# Patient Record
Sex: MALE | Race: BLACK OR AFRICAN AMERICAN | NOT HISPANIC OR LATINO | Employment: OTHER | URBAN - METROPOLITAN AREA
[De-identification: names, ages, dates, MRNs, and addresses within clinical notes are randomized per-mention and may not be internally consistent; named-entity substitution may affect disease eponyms.]

---

## 2017-02-23 ENCOUNTER — TELEPHONE (OUTPATIENT)
Dept: SMOKING CESSATION | Facility: CLINIC | Age: 67
End: 2017-02-23

## 2017-02-23 NOTE — TELEPHONE ENCOUNTER
Called pt for missed appointment with smoking cessation. Left message with name and call back number to reschedule [phone].

## 2019-01-13 ENCOUNTER — HOSPITAL ENCOUNTER (INPATIENT)
Facility: HOSPITAL | Age: 69
LOS: 7 days | Discharge: HOME HEALTH | DRG: 871 | End: 2019-01-20
Attending: EMERGENCY MEDICINE | Admitting: INTERNAL MEDICINE
Payer: MEDICARE

## 2019-01-13 DIAGNOSIS — I95.9 HYPOTENSION: ICD-10-CM

## 2019-01-13 DIAGNOSIS — I50.9 CHF (CONGESTIVE HEART FAILURE): ICD-10-CM

## 2019-01-13 DIAGNOSIS — R09.02 HYPOXIA: ICD-10-CM

## 2019-01-13 DIAGNOSIS — A41.9 SEPSIS, DUE TO UNSPECIFIED ORGANISM: Primary | ICD-10-CM

## 2019-01-13 DIAGNOSIS — I49.9 ABNORMAL HEART RHYTHM: ICD-10-CM

## 2019-01-13 DIAGNOSIS — R94.31 ACUTE ELECTROCARDIOGRAPHY CHANGES: ICD-10-CM

## 2019-01-13 DIAGNOSIS — A41.9 SEPSIS: ICD-10-CM

## 2019-01-13 PROBLEM — R79.89 ELEVATED TROPONIN: Status: ACTIVE | Noted: 2019-01-13

## 2019-01-13 LAB
ALBUMIN SERPL BCP-MCNC: 3 G/DL
ALLENS TEST: ABNORMAL
ALP SERPL-CCNC: 202 U/L
ALT SERPL W/O P-5'-P-CCNC: 21 U/L
ANION GAP SERPL CALC-SCNC: 16 MMOL/L
AST SERPL-CCNC: 26 U/L
BACTERIA #/AREA URNS AUTO: ABNORMAL /HPF
BASOPHILS # BLD AUTO: 0.1 K/UL
BASOPHILS NFR BLD: 0.7 %
BILIRUB SERPL-MCNC: 1.6 MG/DL
BILIRUB UR QL STRIP: NEGATIVE
BNP SERPL-MCNC: 165 PG/ML
BUN SERPL-MCNC: 15 MG/DL (ref 6–30)
BUN SERPL-MCNC: 18 MG/DL
CALCIUM SERPL-MCNC: 8.8 MG/DL
CHLORIDE SERPL-SCNC: 106 MMOL/L
CHLORIDE SERPL-SCNC: 110 MMOL/L (ref 95–110)
CLARITY UR REFRACT.AUTO: ABNORMAL
CO2 SERPL-SCNC: 18 MMOL/L
COLOR UR AUTO: YELLOW
CREAT SERPL-MCNC: 1.3 MG/DL (ref 0.5–1.4)
CREAT SERPL-MCNC: 1.7 MG/DL
CTP QC/QA: YES
DELSYS: ABNORMAL
DIFFERENTIAL METHOD: ABNORMAL
EOSINOPHIL # BLD AUTO: 0 K/UL
EOSINOPHIL NFR BLD: 0.1 %
ERYTHROCYTE [DISTWIDTH] IN BLOOD BY AUTOMATED COUNT: 12.8 %
EST. GFR  (AFRICAN AMERICAN): 47.5 ML/MIN/1.73 M^2
EST. GFR  (NON AFRICAN AMERICAN): 41.1 ML/MIN/1.73 M^2
GLUCOSE SERPL-MCNC: 126 MG/DL
GLUCOSE SERPL-MCNC: 130 MG/DL (ref 70–110)
GLUCOSE UR QL STRIP: NEGATIVE
HCO3 UR-SCNC: 17.8 MMOL/L (ref 24–28)
HCT VFR BLD AUTO: 38.7 %
HCT VFR BLD CALC: 34 %PCV (ref 36–54)
HGB BLD-MCNC: 13.5 G/DL
HGB UR QL STRIP: ABNORMAL
IMM GRANULOCYTES # BLD AUTO: 0.6 K/UL
IMM GRANULOCYTES NFR BLD AUTO: 4.3 %
INR PPP: 1.2
KETONES UR QL STRIP: NEGATIVE
LACTATE SERPL-SCNC: 1.6 MMOL/L
LACTATE SERPL-SCNC: 3.4 MMOL/L
LEUKOCYTE ESTERASE UR QL STRIP: ABNORMAL
LIPASE SERPL-CCNC: 13 U/L
LYMPHOCYTES # BLD AUTO: 0.3 K/UL
LYMPHOCYTES NFR BLD: 1.9 %
MAGNESIUM SERPL-MCNC: 1.6 MG/DL
MCH RBC QN AUTO: 35.2 PG
MCHC RBC AUTO-ENTMCNC: 34.9 G/DL
MCV RBC AUTO: 101 FL
MICROSCOPIC COMMENT: ABNORMAL
MODE: ABNORMAL
MONOCYTES # BLD AUTO: 0.1 K/UL
MONOCYTES NFR BLD: 0.6 %
NEUTROPHILS # BLD AUTO: 12.9 K/UL
NEUTROPHILS NFR BLD: 92.4 %
NITRITE UR QL STRIP: NEGATIVE
NRBC BLD-RTO: 0 /100 WBC
PCO2 BLDA: 28.6 MMHG (ref 35–45)
PH SMN: 7.4 [PH] (ref 7.35–7.45)
PH UR STRIP: 5 [PH] (ref 5–8)
PHOSPHATE SERPL-MCNC: 1.1 MG/DL
PLATELET # BLD AUTO: 198 K/UL
PMV BLD AUTO: 9.7 FL
PO2 BLDA: 49 MMHG (ref 40–60)
POC BE: -7 MMOL/L
POC IONIZED CALCIUM: 0.95 MMOL/L (ref 1.06–1.42)
POC MOLECULAR INFLUENZA A AGN: NEGATIVE
POC MOLECULAR INFLUENZA B AGN: NEGATIVE
POC SATURATED O2: 85 % (ref 95–100)
POC TCO2 (MEASURED): 21 MMOL/L (ref 23–29)
POC TCO2: 19 MMOL/L (ref 24–29)
POTASSIUM BLD-SCNC: 3.7 MMOL/L (ref 3.5–5.1)
POTASSIUM SERPL-SCNC: 2.9 MMOL/L
PROT SERPL-MCNC: 7.4 G/DL
PROT UR QL STRIP: NEGATIVE
PROTHROMBIN TIME: 12.5 SEC
RBC # BLD AUTO: 3.84 M/UL
RBC #/AREA URNS AUTO: 7 /HPF (ref 0–4)
SAMPLE: ABNORMAL
SAMPLE: ABNORMAL
SITE: ABNORMAL
SODIUM BLD-SCNC: 142 MMOL/L (ref 136–145)
SODIUM SERPL-SCNC: 140 MMOL/L
SP GR UR STRIP: 1.01 (ref 1–1.03)
SQUAMOUS #/AREA URNS AUTO: 4 /HPF
TROPONIN I SERPL DL<=0.01 NG/ML-MCNC: 0.03 NG/ML
TROPONIN I SERPL DL<=0.01 NG/ML-MCNC: 0.08 NG/ML
TROPONIN I SERPL DL<=0.01 NG/ML-MCNC: 0.1 NG/ML
URN SPEC COLLECT METH UR: ABNORMAL
WBC # BLD AUTO: 13.98 K/UL
WBC #/AREA URNS AUTO: 17 /HPF (ref 0–5)

## 2019-01-13 PROCEDURE — 85610 PROTHROMBIN TIME: CPT

## 2019-01-13 PROCEDURE — 96361 HYDRATE IV INFUSION ADD-ON: CPT

## 2019-01-13 PROCEDURE — 84484 ASSAY OF TROPONIN QUANT: CPT | Mod: 91

## 2019-01-13 PROCEDURE — 93010 ELECTROCARDIOGRAM REPORT: CPT | Mod: ,,, | Performed by: INTERNAL MEDICINE

## 2019-01-13 PROCEDURE — 80053 COMPREHEN METABOLIC PANEL: CPT

## 2019-01-13 PROCEDURE — 63600175 PHARM REV CODE 636 W HCPCS: Performed by: PHYSICIAN ASSISTANT

## 2019-01-13 PROCEDURE — 99291 PR CRITICAL CARE, E/M 30-74 MINUTES: ICD-10-PCS | Mod: ,,, | Performed by: EMERGENCY MEDICINE

## 2019-01-13 PROCEDURE — 84484 ASSAY OF TROPONIN QUANT: CPT

## 2019-01-13 PROCEDURE — 99900035 HC TECH TIME PER 15 MIN (STAT)

## 2019-01-13 PROCEDURE — 87186 SC STD MICRODIL/AGAR DIL: CPT

## 2019-01-13 PROCEDURE — 82803 BLOOD GASES ANY COMBINATION: CPT

## 2019-01-13 PROCEDURE — 96367 TX/PROPH/DG ADDL SEQ IV INF: CPT

## 2019-01-13 PROCEDURE — 87040 BLOOD CULTURE FOR BACTERIA: CPT | Mod: 59

## 2019-01-13 PROCEDURE — 83735 ASSAY OF MAGNESIUM: CPT

## 2019-01-13 PROCEDURE — 87086 URINE CULTURE/COLONY COUNT: CPT

## 2019-01-13 PROCEDURE — 87449 NOS EACH ORGANISM AG IA: CPT

## 2019-01-13 PROCEDURE — 99291 CRITICAL CARE FIRST HOUR: CPT | Mod: ,,, | Performed by: EMERGENCY MEDICINE

## 2019-01-13 PROCEDURE — 25000003 PHARM REV CODE 250: Performed by: PHYSICIAN ASSISTANT

## 2019-01-13 PROCEDURE — 83605 ASSAY OF LACTIC ACID: CPT | Mod: 91

## 2019-01-13 PROCEDURE — 12000002 HC ACUTE/MED SURGE SEMI-PRIVATE ROOM

## 2019-01-13 PROCEDURE — 93010 EKG 12-LEAD: ICD-10-PCS | Mod: ,,, | Performed by: INTERNAL MEDICINE

## 2019-01-13 PROCEDURE — 85025 COMPLETE CBC W/AUTO DIFF WBC: CPT

## 2019-01-13 PROCEDURE — 83690 ASSAY OF LIPASE: CPT

## 2019-01-13 PROCEDURE — 93005 ELECTROCARDIOGRAM TRACING: CPT

## 2019-01-13 PROCEDURE — 84100 ASSAY OF PHOSPHORUS: CPT

## 2019-01-13 PROCEDURE — 87077 CULTURE AEROBIC IDENTIFY: CPT

## 2019-01-13 PROCEDURE — 96366 THER/PROPH/DIAG IV INF ADDON: CPT

## 2019-01-13 PROCEDURE — 96365 THER/PROPH/DIAG IV INF INIT: CPT

## 2019-01-13 PROCEDURE — 99285 EMERGENCY DEPT VISIT HI MDM: CPT | Mod: 25

## 2019-01-13 PROCEDURE — 81001 URINALYSIS AUTO W/SCOPE: CPT

## 2019-01-13 PROCEDURE — 87076 CULTURE ANAEROBE IDENT EACH: CPT | Mod: 59

## 2019-01-13 PROCEDURE — 83880 ASSAY OF NATRIURETIC PEPTIDE: CPT

## 2019-01-13 RX ORDER — NOREPINEPHRINE BITARTRATE/D5W 4MG/250ML
PLASTIC BAG, INJECTION (ML) INTRAVENOUS
Status: DISPENSED
Start: 2019-01-13 | End: 2019-01-14

## 2019-01-13 RX ORDER — SODIUM CHLORIDE 9 MG/ML
INJECTION, SOLUTION INTRAVENOUS CONTINUOUS
Status: DISCONTINUED | OUTPATIENT
Start: 2019-01-14 | End: 2019-01-13

## 2019-01-13 RX ORDER — POTASSIUM CHLORIDE 20 MEQ/15ML
60 SOLUTION ORAL
Status: DISCONTINUED | OUTPATIENT
Start: 2019-01-13 | End: 2019-01-13

## 2019-01-13 RX ORDER — GLUCAGON 1 MG
1 KIT INJECTION
Status: DISCONTINUED | OUTPATIENT
Start: 2019-01-14 | End: 2019-01-15

## 2019-01-13 RX ORDER — SODIUM CHLORIDE 0.9 % (FLUSH) 0.9 %
5 SYRINGE (ML) INJECTION
Status: DISCONTINUED | OUTPATIENT
Start: 2019-01-14 | End: 2019-01-20 | Stop reason: HOSPADM

## 2019-01-13 RX ORDER — IBUPROFEN 200 MG
16 TABLET ORAL
Status: DISCONTINUED | OUTPATIENT
Start: 2019-01-14 | End: 2019-01-15

## 2019-01-13 RX ORDER — VANCOMYCIN 2 GRAM/500 ML IN 0.9 % SODIUM CHLORIDE INTRAVENOUS
2000
Status: COMPLETED | OUTPATIENT
Start: 2019-01-13 | End: 2019-01-14

## 2019-01-13 RX ORDER — POTASSIUM CHLORIDE 20 MEQ/15ML
40 SOLUTION ORAL
Status: COMPLETED | OUTPATIENT
Start: 2019-01-13 | End: 2019-01-13

## 2019-01-13 RX ORDER — SODIUM,POTASSIUM PHOSPHATES 280-250MG
2 POWDER IN PACKET (EA) ORAL ONCE
Status: COMPLETED | OUTPATIENT
Start: 2019-01-13 | End: 2019-01-13

## 2019-01-13 RX ORDER — IBUPROFEN 200 MG
1 TABLET ORAL DAILY
Status: DISCONTINUED | OUTPATIENT
Start: 2019-01-14 | End: 2019-01-20 | Stop reason: HOSPADM

## 2019-01-13 RX ORDER — RAMELTEON 8 MG/1
8 TABLET ORAL NIGHTLY PRN
Status: DISCONTINUED | OUTPATIENT
Start: 2019-01-14 | End: 2019-01-20 | Stop reason: HOSPADM

## 2019-01-13 RX ORDER — SODIUM CHLORIDE 0.9 % (FLUSH) 0.9 %
5 SYRINGE (ML) INJECTION
Status: CANCELLED | OUTPATIENT
Start: 2019-01-13

## 2019-01-13 RX ORDER — SODIUM CHLORIDE 9 MG/ML
INJECTION, SOLUTION INTRAVENOUS CONTINUOUS
Status: DISCONTINUED | OUTPATIENT
Start: 2019-01-13 | End: 2019-01-14

## 2019-01-13 RX ORDER — NOREPINEPHRINE BITARTRATE/D5W 4MG/250ML
0.05 PLASTIC BAG, INJECTION (ML) INTRAVENOUS CONTINUOUS
Status: DISCONTINUED | OUTPATIENT
Start: 2019-01-13 | End: 2019-01-13

## 2019-01-13 RX ORDER — IBUPROFEN 200 MG
24 TABLET ORAL
Status: DISCONTINUED | OUTPATIENT
Start: 2019-01-14 | End: 2019-01-15

## 2019-01-13 RX ORDER — MAGNESIUM SULFATE HEPTAHYDRATE 40 MG/ML
2 INJECTION, SOLUTION INTRAVENOUS
Status: COMPLETED | OUTPATIENT
Start: 2019-01-13 | End: 2019-01-14

## 2019-01-13 RX ORDER — ACETAMINOPHEN 325 MG/1
650 TABLET ORAL
Status: COMPLETED | OUTPATIENT
Start: 2019-01-13 | End: 2019-01-13

## 2019-01-13 RX ORDER — ACETAMINOPHEN 325 MG/1
650 TABLET ORAL EVERY 8 HOURS PRN
Status: DISCONTINUED | OUTPATIENT
Start: 2019-01-14 | End: 2019-01-17

## 2019-01-13 RX ADMIN — Medication 2000 MG: at 09:01

## 2019-01-13 RX ADMIN — POTASSIUM & SODIUM PHOSPHATES POWDER PACK 280-160-250 MG 2 PACKET: 280-160-250 PACK at 07:01

## 2019-01-13 RX ADMIN — POTASSIUM CHLORIDE 40 MEQ: 20 SOLUTION ORAL at 07:01

## 2019-01-13 RX ADMIN — SODIUM CHLORIDE, SODIUM LACTATE, POTASSIUM CHLORIDE, AND CALCIUM CHLORIDE 1000 ML: .6; .31; .03; .02 INJECTION, SOLUTION INTRAVENOUS at 07:01

## 2019-01-13 RX ADMIN — PIPERACILLIN AND TAZOBACTAM 4.5 G: 4; .5 INJECTION, POWDER, LYOPHILIZED, FOR SOLUTION INTRAVENOUS; PARENTERAL at 10:01

## 2019-01-13 RX ADMIN — CEFTRIAXONE 2 G: 2 INJECTION, SOLUTION INTRAVENOUS at 05:01

## 2019-01-13 RX ADMIN — ACETAMINOPHEN 650 MG: 325 TABLET ORAL at 05:01

## 2019-01-13 RX ADMIN — MAGNESIUM SULFATE IN WATER 2 G: 40 INJECTION, SOLUTION INTRAVENOUS at 07:01

## 2019-01-13 RX ADMIN — SODIUM CHLORIDE 2586 ML: 0.9 INJECTION, SOLUTION INTRAVENOUS at 04:01

## 2019-01-13 NOTE — ED TRIAGE NOTES
C/o 1 week of nausea and diarrhea, diagnosed with prostatitis yesterday in ed, states he was more fatigued and short of breath this morning, denies pain at present

## 2019-01-13 NOTE — ED PROVIDER NOTES
"Encounter Date: 1/13/2019    SCRIBE #1 NOTE: I, Meenu Jimenez, am scribing for, and in the presence of,  Dr. Odonnell. I have scribed the following portions of the note - the EKG reading.       History     Chief Complaint   Patient presents with    Dizziness     dizziness x 1 hour, "can't eat", no appetite, "stays cold", states he has been shaking, pt diaphoretic in triage     This is a 68 year old male with no known significant PMH who presents to the ED with dizziness. He reports a 1 week history of nausea, unable to keep anything down - is having diarrhea with any oral intake. Also endorsing urinary frequency, dysuria, and urgency. He was seen in the ED yesterday and treated for prostatitis with Cipro (has taken 2 doses). This morning he awoke feeling more fatigued, short of breath, and dizzy described as near syncopal. Endorses fever, chills, and rigors. Has mild low back pain across his back. His brother brings him to the ED for further evaluation. Denies URI symptoms, chest pain, vomiting, abdominal pain, gross hematuria, melena or hematochezia. No prior abdominal surgeries. No known sick contacts or recent travel.          Review of patient's allergies indicates:  No Known Allergies  History reviewed. No pertinent past medical history.  Past Surgical History:   Procedure Laterality Date    SKIN GRAFT       History reviewed. No pertinent family history.  Social History     Tobacco Use    Smoking status: Current Every Day Smoker     Packs/day: 0.50    Smokeless tobacco: Never Used   Substance Use Topics    Alcohol use: Yes     Alcohol/week: 6.0 oz     Types: 10 Cans of beer per week    Drug use: No     Review of Systems   Constitutional: Positive for chills, fatigue and fever.   HENT: Negative for sore throat.    Respiratory: Positive for shortness of breath. Negative for cough.    Cardiovascular: Negative for chest pain and leg swelling.   Gastrointestinal: Positive for diarrhea and nausea. Negative for " abdominal pain and vomiting.   Genitourinary: Negative for dysuria.   Musculoskeletal: Positive for back pain.   Skin: Negative for rash.   Neurological: Positive for dizziness. Negative for weakness.   Hematological: Does not bruise/bleed easily.       Physical Exam     Initial Vitals [01/13/19 1558]   BP Pulse Resp Temp SpO2   (!) 90/51 (!) 162 (!) 24 98 °F (36.7 °C) (!) 85 %      MAP       --         Physical Exam    Constitutional: He appears well-developed and well-nourished. He appears distressed.   HENT:   Head: Atraumatic.   Mouth/Throat: Mucous membranes are dry.   Eyes: Conjunctivae and EOM are normal. Pupils are equal, round, and reactive to light.   Neck: Normal range of motion and full passive range of motion without pain. Neck supple.   Cardiovascular: Regular rhythm, normal heart sounds and intact distal pulses. Tachycardia present.    No edema.   Pulmonary/Chest: Breath sounds normal. Tachypnea noted. He has no wheezes. He has no rhonchi. He has no rales.   Abdominal: Soft. Bowel sounds are normal. There is no tenderness. There is no rigidity, no rebound, no guarding and no CVA tenderness.   Genitourinary: Right testis shows no swelling and no tenderness. Left testis shows no swelling and no tenderness. Uncircumcised. No penile tenderness.   Neurological: He is alert and oriented to person, place, and time.   Skin: Skin is warm and dry. No rash noted.         ED Course   Critical Care  Date/Time: 1/13/2019 5:19 PM  Performed by: Je Odonnell MD  Authorized by: Je Odonnell MD   Direct patient critical care time: 15 minutes  Additional history critical care time: 10 minutes  Ordering / reviewing critical care time: 10 minutes  Documentation critical care time: 10 minutes  Consulting other physicians critical care time: 5 minutes  Total critical care time (exclusive of procedural time) : 50 minutes  Critical care was necessary to treat or prevent imminent or life-threatening deterioration of  the following conditions: sepsis, shock and respiratory failure.  Critical care was time spent personally by me on the following activities: evaluation of patient's response to treatment, examination of patient, obtaining history from patient or surrogate, ordering and performing treatments and interventions, ordering and review of laboratory studies, ordering and review of radiographic studies, pulse oximetry, re-evaluation of patient's condition and review of old charts.  Comments: Critical care required for hypotension, respiratory distress, sepsis.        Labs Reviewed   CBC W/ AUTO DIFFERENTIAL - Abnormal; Notable for the following components:       Result Value    WBC 13.98 (*)     RBC 3.84 (*)     Hemoglobin 13.5 (*)     Hematocrit 38.7 (*)      (*)     MCH 35.2 (*)     Immature Granulocytes 4.3 (*)     Gran # (ANC) 12.9 (*)     Immature Grans (Abs) 0.60 (*)     Lymph # 0.3 (*)     Mono # 0.1 (*)     Gran% 92.4 (*)     Lymph% 1.9 (*)     Mono% 0.6 (*)     All other components within normal limits    Narrative:     lav shared   COMPREHENSIVE METABOLIC PANEL - Abnormal; Notable for the following components:    Potassium 2.9 (*)     CO2 18 (*)     Glucose 126 (*)     Creatinine 1.7 (*)     Albumin 3.0 (*)     Total Bilirubin 1.6 (*)     Alkaline Phosphatase 202 (*)     eGFR if  47.5 (*)     eGFR if non  41.1 (*)     All other components within normal limits    Narrative:     lav shared   LACTIC ACID, PLASMA - Abnormal; Notable for the following components:    Lactate (Lactic Acid) 3.4 (*)     All other components within normal limits    Narrative:     lav shared   URINALYSIS, REFLEX TO URINE CULTURE - Abnormal; Notable for the following components:    Appearance, UA Hazy (*)     Occult Blood UA 1+ (*)     Leukocytes, UA 1+ (*)     All other components within normal limits    Narrative:     Preferred Collection Type->Urine, Clean Catch  yellow and grey   B-TYPE NATRIURETIC  PEPTIDE - Abnormal; Notable for the following components:     (*)     All other components within normal limits    Narrative:     lav shared   TROPONIN I - Abnormal; Notable for the following components:    Troponin I 0.034 (*)     All other components within normal limits    Narrative:     lav shared   PHOSPHORUS - Abnormal; Notable for the following components:    Phosphorus 1.1 (*)     All other components within normal limits    Narrative:     lav shared   TROPONIN I - Abnormal; Notable for the following components:    Troponin I 0.101 (*)     All other components within normal limits   URINALYSIS MICROSCOPIC - Abnormal; Notable for the following components:    RBC, UA 7 (*)     WBC, UA 17 (*)     All other components within normal limits    Narrative:     Preferred Collection Type->Urine, Clean Catch  yellow and grey   ISTAT PROCEDURE - Abnormal; Notable for the following components:    POC PCO2 28.6 (*)     POC HCO3 17.8 (*)     POC SATURATED O2 85 (*)     POC TCO2 19 (*)     All other components within normal limits   ISTAT PROCEDURE - Abnormal; Notable for the following components:    POC Glucose 130 (*)     POC TCO2 (MEASURED) 21 (*)     POC Ionized Calcium 0.95 (*)     POC Hematocrit 34 (*)     All other components within normal limits   CULTURE, BLOOD   CULTURE, BLOOD   CULTURE, URINE   CLOSTRIDIUM DIFFICILE   LACTIC ACID, PLASMA   PROTIME-INR    Narrative:     lav shared   LIPASE    Narrative:     lav shared   MAGNESIUM    Narrative:     lav shared   TROPONIN I   POCT INFLUENZA A/B MOLECULAR   ISTAT CHEM8     EKG Readings: (Independently Interpreted)   Rhythm: Sinus Tachycardia. Heart Rate: 134. Axis: Normal.   Nonspecific ST abnormalities present.  QTc is 561.       Imaging Results          X-Ray Chest AP Portable (Final result)  Result time 01/13/19 16:37:42    Final result by Jose M Armijo Jr., MD (01/13/19 16:37:42)                 Impression:      No detrimental change.  No acute  abnormality.      Electronically signed by: Jose M Armijo MD  Date:    01/13/2019  Time:    16:37             Narrative:    EXAMINATION:  XR CHEST AP PORTABLE    CLINICAL HISTORY:  Sepsis;    TECHNIQUE:  Single frontal view of the chest was performed.    COMPARISON:  December 2012.    FINDINGS:  Heart size and pulmonary vessels are similar.  The lungs fairly well aerated.  Few increased markings at the bases unchanged.                                 Medical Decision Making:   History:   Old Medical Records: I decided to obtain old medical records.  Independently Interpreted Test(s):   I have ordered and independently interpreted EKG Reading(s) - see prior notes  Clinical Tests:   Lab Tests: Ordered and Reviewed  Radiological Study: Ordered and Reviewed  Medical Tests: Ordered and Reviewed       APC / Resident Notes:   68 year old male presenting with dizziness, fatigue, recent dx of prostatitis.  On exam he is afebrile, appears uncomfortable - tachycardic to 162, BP 90/51, O2 85%. Lungs are clear. Abdomen is soft, nontender. No peripheral edema.  exam unremarkable.    DDx includes but is not limited to sepsis, septic shock, UTI, pyelonephritis, prostatitis, diverticulitis, heart failure.    Patient requiring supplemental oxygen to keep sat > 93%. BP improved with fluid resuscitation, given 30ccs/kg. Antibiotics were given per sepsis protocol. I attest, a sepsis perfusion exam was performed within 6 hours of Septic Shock presentation, following fluid resuscitation.     Labs demonstrate WBC 13.98 with shift, H&H 13/28, K 2.9 - repleated in ED, Cr 1.7, phos 1.1 - repleated in ED. Troponin uptrending 0.034 >>0.101, suspect due to demand ischemia.  Will plan for admission. I discussed the care of this patient with my supervising MD.        Scribe Attestation:   Scribe #1: I performed the above scribed service and the documentation accurately describes the services I performed. I attest to the accuracy of the  note.    Attending Attestation:     Physician Attestation Statement for NP/PA:   I have conducted a face to face encounter with this patient in addition to the NP/PA, due to Medical Complexity    Other NP/PA Attestation Additions:    History of Present Illness: [age]-year-old male with no past medical history coming in with nausea, diarrhea, dysuria.  Was seen yesterday diagnosed with possible prostatitis given antibiotics discharge. Symptoms have worsened today.  He denies abdominal pain. He denies chest pain or shortness of breath. Endorses generalized weakness.    Physical Exam: GENERAL APPEARANCE: Well developed, well nourished, ill-appearing, tired-appearing.  HENT: Normocephalic, atraumatic    EYES: Sclerae anicteric   NECK: Supple  LUNGS: Breathing comfortably. Speaking in full sentences   NEUROLOGIC: Alert, interacting normally. No facial droop.   Abdomen:  Abdomen is soft nontender  :  There is no testicular swelling, or erythema, there is mild intertrigo.  There is no bull eye.  There is no crepitus, penis is unremarkable.   MSK: Moving all four extremities. There is no CVA tenderness to palpation. There is no C, T, L spine tenderness to palpation  Skin: Warm and dry. No visible rash on exposed areas of skin.    Psych: Mood and affect normal.      Medical Decision Making: Hypotension, hypoxia, tachycardia recent possible UTI.  Unclear cause for hypoxic this time, possible poor perfusion in the setting of sepsis.  There is no signs of marcelino gangrene.  Possible urosepsis.   Initiated sepsis order set including full set of labs, 30 cc/kg, cultures, antibiotics.    Chest x-ray done no signs of infiltrate, edema.  Point of care ultrasound undertaken, no pericardial effusion, IVC partially collapsing with respirations, there is no fluid in Morison's pouch or the splenorenal pouch, there is no obvious AAA.  Will continue to monitor closely for changes in blood pressure, O2 sat, heart rate as treatment is  given.  Patient will need to be admitted    Update:  Labs noted. K, and phosphate repleted.  Urine possible signs possible infection as source of sepsis.  Chest x-ray with no signs of infection.     Patient given 3.5 L of IV fluids, still remains borderline hypertensive.   Was going to start pressors, or patient's map above 65 suppressants not started in the emergency department.  Hypoxia improving although still at this point because unclear.  Patient has no chest pain, no significant shortness of breath, his complaints are primarily diarrhea and nausea, he has no lower extremity swelling, this is not consistent with DVT PE at this time.  Patient to continue be observed and if no cause of hypoxia is identified, hypoxia worsens, patient develops chest pain, patient started developing shortness of breath, lightness of PE should be considered further.   Cover with antibiotics.   Repeat lactate trending down.     Trop is trending mildly up, however there is no chest pain.  EKG is sinus tachycardia with no significant ischemic changes, will need to trend troponin.     Given hypotension critical care medicine consulted.  They recommend broadening antibiotics and scrotal ultrasound.   Not CCU candidate at this time. Those orders are placed.      Patient will be admitted to telemetry for continued close monitoring.                     Clinical Impression:   The primary encounter diagnosis was Sepsis, due to unspecified organism. Diagnoses of Hypotension and Hypoxia were also pertinent to this visit.      Disposition:   Disposition: Admitted  Condition: Serious                        Renetta Albert PA-C  01/13/19 0138       Je Odonnell MD  01/13/19 0052

## 2019-01-13 NOTE — PROVIDER PROGRESS NOTES - EMERGENCY DEPT.
Encounter Date: 1/13/2019    ED Physician Progress Notes        Physician Note:   Labs still not resulted.  Calling lab to find out cause of delay.   BP mildy improved.  O2 sat improved.  HR improved.

## 2019-01-13 NOTE — PROVIDER PROGRESS NOTES - EMERGENCY DEPT.
Encounter Date: 1/13/2019    ED Physician Progress Notes        Physician Note:   Labs found. They are being sent up to lab now.

## 2019-01-14 PROBLEM — N30.00 ACUTE CYSTITIS WITHOUT HEMATURIA: Status: ACTIVE | Noted: 2019-01-14

## 2019-01-14 PROBLEM — N17.9 AKI (ACUTE KIDNEY INJURY): Status: ACTIVE | Noted: 2019-01-14

## 2019-01-14 PROBLEM — R65.20 SEVERE SEPSIS: Status: ACTIVE | Noted: 2019-01-13

## 2019-01-14 PROBLEM — I95.9 HYPOTENSION: Status: ACTIVE | Noted: 2019-01-14

## 2019-01-14 PROBLEM — R79.89 ELEVATED TROPONIN: Status: RESOLVED | Noted: 2019-01-13 | Resolved: 2019-01-14

## 2019-01-14 PROBLEM — I10 ESSENTIAL HYPERTENSION: Status: ACTIVE | Noted: 2019-01-14

## 2019-01-14 PROBLEM — Z75.8 DISCHARGE PLANNING ISSUES: Status: ACTIVE | Noted: 2019-01-14

## 2019-01-14 PROBLEM — J96.01 ACUTE RESPIRATORY FAILURE WITH HYPOXIA: Status: ACTIVE | Noted: 2019-01-14

## 2019-01-14 LAB
ALBUMIN SERPL BCP-MCNC: 3.1 G/DL
ALLENS TEST: ABNORMAL
ALP SERPL-CCNC: 192 U/L
ALT SERPL W/O P-5'-P-CCNC: 28 U/L
AMMONIA PLAS-SCNC: 42 UMOL/L
ANION GAP SERPL CALC-SCNC: 13 MMOL/L
ANISOCYTOSIS BLD QL SMEAR: SLIGHT
ASCENDING AORTA: 3.57 CM
AST SERPL-CCNC: 43 U/L
AV INDEX (PROSTH): 1.37
AV MEAN GRADIENT: 2.89 MMHG
AV PEAK GRADIENT: 5.57 MMHG
AV VALVE AREA: 4.78 CM2
BASOPHILS # BLD AUTO: 0.1 K/UL
BASOPHILS NFR BLD: 0.7 %
BILIRUB SERPL-MCNC: 1.5 MG/DL
BNP SERPL-MCNC: 730 PG/ML
BSA FOR ECHO PROCEDURE: 2.05 M2
BUN SERPL-MCNC: 14 MG/DL
C DIFF GDH STL QL: NEGATIVE
C DIFF TOX A+B STL QL IA: NEGATIVE
CALCIUM SERPL-MCNC: 9 MG/DL
CHLORIDE SERPL-SCNC: 111 MMOL/L
CO2 SERPL-SCNC: 18 MMOL/L
CREAT SERPL-MCNC: 1.2 MG/DL
CV ECHO LV RWT: 0.31 CM
DELSYS: ABNORMAL
DIFFERENTIAL METHOD: ABNORMAL
DOP CALC AO PEAK VEL: 1.18 M/S
DOP CALC AO VTI: 16.32 CM
DOP CALC LVOT AREA: 3.49 CM2
DOP CALC LVOT DIAMETER: 2.11 CM
DOP CALC LVOT STROKE VOLUME: 78.08 CM3
DOP CALCLVOT PEAK VEL VTI: 22.34 CM
E WAVE DECELERATION TIME: 313.84 MSEC
E/A RATIO: 0.74
E/E' RATIO: 5.64
ECHO LV POSTERIOR WALL: 0.8 CM (ref 0.6–1.1)
EOSINOPHIL # BLD AUTO: 0 K/UL
EOSINOPHIL NFR BLD: 0.1 %
EP: 5
ERYTHROCYTE [DISTWIDTH] IN BLOOD BY AUTOMATED COUNT: 13 %
ERYTHROCYTE [SEDIMENTATION RATE] IN BLOOD BY WESTERGREN METHOD: 14 MM/H
EST. GFR  (AFRICAN AMERICAN): >60 ML/MIN/1.73 M^2
EST. GFR  (NON AFRICAN AMERICAN): >60 ML/MIN/1.73 M^2
ESTIMATED AVG GLUCOSE: 97 MG/DL
FIO2: 50
FOLATE SERPL-MCNC: 8.5 NG/ML
FRACTIONAL SHORTENING: 35 % (ref 28–44)
GGT SERPL-CCNC: 195 U/L
GLUCOSE SERPL-MCNC: 135 MG/DL
HBA1C MFR BLD HPLC: 5 %
HCO3 UR-SCNC: 19.3 MMOL/L (ref 24–28)
HCT VFR BLD AUTO: 43.3 %
HGB BLD-MCNC: 14.8 G/DL
IMM GRANULOCYTES # BLD AUTO: 0.08 K/UL
IMM GRANULOCYTES NFR BLD AUTO: 0.6 %
INTERVENTRICULAR SEPTUM: 0.8 CM (ref 0.6–1.1)
IP: 12
LA MAJOR: 5.29 CM
LA MINOR: 5.32 CM
LA WIDTH: 3.22 CM
LDH SERPL L TO P-CCNC: 1.25 MMOL/L (ref 0.36–1.25)
LEFT ATRIUM SIZE: 3.57 CM
LEFT ATRIUM VOLUME INDEX: 25.9 ML/M2
LEFT ATRIUM VOLUME: 51.84 CM3
LEFT INTERNAL DIMENSION IN SYSTOLE: 3.3 CM (ref 2.1–4)
LEFT VENTRICLE DIASTOLIC VOLUME INDEX: 55.28 ML/M2
LEFT VENTRICLE DIASTOLIC VOLUME: 110.74 ML
LEFT VENTRICLE MASS INDEX: 70.1 G/M2
LEFT VENTRICLE SYSTOLIC VOLUME INDEX: 23 ML/M2
LEFT VENTRICLE SYSTOLIC VOLUME: 45.99 ML
LEFT VENTRICULAR INTERNAL DIMENSION IN DIASTOLE: 5.1 CM (ref 3.5–6)
LEFT VENTRICULAR MASS: 140.47 G
LV LATERAL E/E' RATIO: 5.17
LV SEPTAL E/E' RATIO: 6.2
LYMPHOCYTES # BLD AUTO: 1.2 K/UL
LYMPHOCYTES NFR BLD: 8.8 %
MAGNESIUM SERPL-MCNC: 2.2 MG/DL
MCH RBC QN AUTO: 34.8 PG
MCHC RBC AUTO-ENTMCNC: 34.2 G/DL
MCV RBC AUTO: 102 FL
MIN VOL: 45
MODE: ABNORMAL
MONOCYTES # BLD AUTO: 0.2 K/UL
MONOCYTES NFR BLD: 1.3 %
MV PEAK A VEL: 0.84 M/S
MV PEAK E VEL: 0.62 M/S
NEUTROPHILS # BLD AUTO: 12.4 K/UL
NEUTROPHILS NFR BLD: 88.5 %
NRBC BLD-RTO: 0 /100 WBC
PCO2 BLDA: 42.1 MMHG (ref 35–45)
PH SMN: 7.27 [PH] (ref 7.35–7.45)
PHOSPHATE SERPL-MCNC: 3.1 MG/DL
PHOSPHATE SERPL-MCNC: 3.4 MG/DL
PISA TR MAX VEL: 2.9 M/S
PLATELET # BLD AUTO: 183 K/UL
PLATELET BLD QL SMEAR: ABNORMAL
PMV BLD AUTO: 9.4 FL
PO2 BLDA: 117 MMHG (ref 80–100)
POC BE: -8 MMOL/L
POC SATURATED O2: 98 % (ref 95–100)
POC TCO2: 21 MMOL/L (ref 23–27)
POLYCHROMASIA BLD QL SMEAR: ABNORMAL
POTASSIUM SERPL-SCNC: 3.4 MMOL/L
PROT SERPL-MCNC: 8.1 G/DL
RA MAJOR: 4.72 CM
RA PRESSURE: 3 MMHG
RA WIDTH: 3.35 CM
RBC # BLD AUTO: 4.25 M/UL
RIGHT VENTRICULAR END-DIASTOLIC DIMENSION: 3.16 CM
SAMPLE: ABNORMAL
SINUS: 3.3 CM
SITE: ABNORMAL
SODIUM SERPL-SCNC: 142 MMOL/L
SP02: 95
SPONT RATE: 24
STJ: 3.32 CM
T4 FREE SERPL-MCNC: 0.99 NG/DL
TDI LATERAL: 0.12
TDI SEPTAL: 0.1
TDI: 0.11
TR MAX PG: 33.64 MMHG
TRICUSPID ANNULAR PLANE SYSTOLIC EXCURSION: 2.5 CM
TROPONIN I SERPL DL<=0.01 NG/ML-MCNC: 0.07 NG/ML
TSH SERPL DL<=0.005 MIU/L-ACNC: 0.93 UIU/ML
TV REST PULMONARY ARTERY PRESSURE: 37 MMHG
VIT B12 SERPL-MCNC: 529 PG/ML
WBC # BLD AUTO: 14 K/UL

## 2019-01-14 PROCEDURE — 36415 COLL VENOUS BLD VENIPUNCTURE: CPT

## 2019-01-14 PROCEDURE — 83880 ASSAY OF NATRIURETIC PEPTIDE: CPT

## 2019-01-14 PROCEDURE — 82607 VITAMIN B-12: CPT

## 2019-01-14 PROCEDURE — 94761 N-INVAS EAR/PLS OXIMETRY MLT: CPT

## 2019-01-14 PROCEDURE — 84439 ASSAY OF FREE THYROXINE: CPT

## 2019-01-14 PROCEDURE — 84425 ASSAY OF VITAMIN B-1: CPT

## 2019-01-14 PROCEDURE — 82746 ASSAY OF FOLIC ACID SERUM: CPT

## 2019-01-14 PROCEDURE — 83036 HEMOGLOBIN GLYCOSYLATED A1C: CPT

## 2019-01-14 PROCEDURE — 84443 ASSAY THYROID STIM HORMONE: CPT

## 2019-01-14 PROCEDURE — 63600175 PHARM REV CODE 636 W HCPCS: Performed by: STUDENT IN AN ORGANIZED HEALTH CARE EDUCATION/TRAINING PROGRAM

## 2019-01-14 PROCEDURE — 82140 ASSAY OF AMMONIA: CPT

## 2019-01-14 PROCEDURE — 99223 1ST HOSP IP/OBS HIGH 75: CPT | Mod: ,,, | Performed by: HOSPITALIST

## 2019-01-14 PROCEDURE — 27000190 HC CPAP FULL FACE MASK W/VALVE

## 2019-01-14 PROCEDURE — 83735 ASSAY OF MAGNESIUM: CPT

## 2019-01-14 PROCEDURE — 20600001 HC STEP DOWN PRIVATE ROOM

## 2019-01-14 PROCEDURE — 99291 CRITICAL CARE FIRST HOUR: CPT | Mod: ,,, | Performed by: NURSE PRACTITIONER

## 2019-01-14 PROCEDURE — 84484 ASSAY OF TROPONIN QUANT: CPT

## 2019-01-14 PROCEDURE — 94660 CPAP INITIATION&MGMT: CPT

## 2019-01-14 PROCEDURE — 80053 COMPREHEN METABOLIC PANEL: CPT

## 2019-01-14 PROCEDURE — 99900035 HC TECH TIME PER 15 MIN (STAT)

## 2019-01-14 PROCEDURE — 83605 ASSAY OF LACTIC ACID: CPT

## 2019-01-14 PROCEDURE — 63600175 PHARM REV CODE 636 W HCPCS: Performed by: INTERNAL MEDICINE

## 2019-01-14 PROCEDURE — 27000221 HC OXYGEN, UP TO 24 HOURS

## 2019-01-14 PROCEDURE — 25000003 PHARM REV CODE 250: Performed by: STUDENT IN AN ORGANIZED HEALTH CARE EDUCATION/TRAINING PROGRAM

## 2019-01-14 PROCEDURE — 84100 ASSAY OF PHOSPHORUS: CPT

## 2019-01-14 PROCEDURE — 99291 PR CRITICAL CARE, E/M 30-74 MINUTES: ICD-10-PCS | Mod: ,,, | Performed by: NURSE PRACTITIONER

## 2019-01-14 PROCEDURE — 25000242 PHARM REV CODE 250 ALT 637 W/ HCPCS: Performed by: STUDENT IN AN ORGANIZED HEALTH CARE EDUCATION/TRAINING PROGRAM

## 2019-01-14 PROCEDURE — 85025 COMPLETE CBC W/AUTO DIFF WBC: CPT

## 2019-01-14 PROCEDURE — 82977 ASSAY OF GGT: CPT

## 2019-01-14 PROCEDURE — 25000003 PHARM REV CODE 250: Performed by: INTERNAL MEDICINE

## 2019-01-14 PROCEDURE — 82803 BLOOD GASES ANY COMBINATION: CPT

## 2019-01-14 PROCEDURE — 94640 AIRWAY INHALATION TREATMENT: CPT

## 2019-01-14 PROCEDURE — 99223 PR INITIAL HOSPITAL CARE,LEVL III: ICD-10-PCS | Mod: ,,, | Performed by: HOSPITALIST

## 2019-01-14 PROCEDURE — 84100 ASSAY OF PHOSPHORUS: CPT | Mod: 91

## 2019-01-14 PROCEDURE — 36600 WITHDRAWAL OF ARTERIAL BLOOD: CPT

## 2019-01-14 RX ORDER — IPRATROPIUM BROMIDE AND ALBUTEROL SULFATE 2.5; .5 MG/3ML; MG/3ML
3 SOLUTION RESPIRATORY (INHALATION) ONCE
Status: COMPLETED | OUTPATIENT
Start: 2019-01-14 | End: 2019-01-14

## 2019-01-14 RX ORDER — FUROSEMIDE 10 MG/ML
40 INJECTION INTRAMUSCULAR; INTRAVENOUS ONCE
Status: COMPLETED | OUTPATIENT
Start: 2019-01-14 | End: 2019-01-14

## 2019-01-14 RX ORDER — METHYLPREDNISOLONE SOD SUCC 125 MG
125 VIAL (EA) INJECTION EVERY 6 HOURS
Status: DISCONTINUED | OUTPATIENT
Start: 2019-01-14 | End: 2019-01-15

## 2019-01-14 RX ORDER — POTASSIUM CHLORIDE 20 MEQ/1
40 TABLET, EXTENDED RELEASE ORAL ONCE
Status: COMPLETED | OUTPATIENT
Start: 2019-01-14 | End: 2019-01-14

## 2019-01-14 RX ORDER — MORPHINE SULFATE 2 MG/ML
2 INJECTION, SOLUTION INTRAMUSCULAR; INTRAVENOUS
Status: COMPLETED | OUTPATIENT
Start: 2019-01-14 | End: 2019-01-14

## 2019-01-14 RX ORDER — FUROSEMIDE 10 MG/ML
40 INJECTION INTRAMUSCULAR; INTRAVENOUS 2 TIMES DAILY
Status: DISCONTINUED | OUTPATIENT
Start: 2019-01-14 | End: 2019-01-16 | Stop reason: ALTCHOICE

## 2019-01-14 RX ADMIN — Medication 2 MG: at 06:01

## 2019-01-14 RX ADMIN — SODIUM CHLORIDE: 0.9 INJECTION, SOLUTION INTRAVENOUS at 12:01

## 2019-01-14 RX ADMIN — IPRATROPIUM BROMIDE AND ALBUTEROL SULFATE 3 ML: .5; 3 SOLUTION RESPIRATORY (INHALATION) at 05:01

## 2019-01-14 RX ADMIN — POTASSIUM CHLORIDE 40 MEQ: 1500 TABLET, EXTENDED RELEASE ORAL at 11:01

## 2019-01-14 RX ADMIN — PIPERACILLIN AND TAZOBACTAM 4.5 G: 4; .5 INJECTION, POWDER, LYOPHILIZED, FOR SOLUTION INTRAVENOUS; PARENTERAL at 09:01

## 2019-01-14 RX ADMIN — METHYLPREDNISOLONE SODIUM SUCCINATE 125 MG: 125 INJECTION, POWDER, FOR SOLUTION INTRAMUSCULAR; INTRAVENOUS at 11:01

## 2019-01-14 RX ADMIN — METHYLPREDNISOLONE SODIUM SUCCINATE 125 MG: 125 INJECTION, POWDER, FOR SOLUTION INTRAMUSCULAR; INTRAVENOUS at 07:01

## 2019-01-14 RX ADMIN — PIPERACILLIN AND TAZOBACTAM 4.5 G: 4; .5 INJECTION, POWDER, LYOPHILIZED, FOR SOLUTION INTRAVENOUS; PARENTERAL at 08:01

## 2019-01-14 RX ADMIN — FUROSEMIDE 40 MG: 10 INJECTION, SOLUTION INTRAVENOUS at 06:01

## 2019-01-14 RX ADMIN — VANCOMYCIN HYDROCHLORIDE 1250 MG: 10 INJECTION, POWDER, LYOPHILIZED, FOR SOLUTION INTRAVENOUS at 04:01

## 2019-01-14 RX ADMIN — FUROSEMIDE 40 MG: 10 INJECTION, SOLUTION INTRAVENOUS at 11:01

## 2019-01-14 RX ADMIN — PIPERACILLIN AND TAZOBACTAM 4.5 G: 4; .5 INJECTION, POWDER, LYOPHILIZED, FOR SOLUTION INTRAVENOUS; PARENTERAL at 04:01

## 2019-01-14 RX ADMIN — METHYLPREDNISOLONE SODIUM SUCCINATE 125 MG: 125 INJECTION, POWDER, FOR SOLUTION INTRAMUSCULAR; INTRAVENOUS at 06:01

## 2019-01-14 NOTE — H&P
"[facility] Hospital Medicine  History & Physical    Patient Name: Brian Friedman  MRN: [id number]  Admission Date: 1/13/2019  Attending Physician: Mario Hernandez MD   Primary Care Provider: Primary Doctor [facility] Medicine Team: [facility] HOSP MED 3 Morgan Moeller MD     Patient information was obtained from patient, relative(s) and ER records.     Subjective:     Principal Problem:Severe sepsis    Chief Complaint:   Chief Complaint   Patient presents with    Dizziness     dizziness x 1 hour, "can't eat", no appetite, "stays cold", states he has been shaking, pt diaphoretic in triage        HPI: Mr. Friedman is a 68 year old male with a history of HTN who presented to [facility] ED for evaluation of dizziness. The patient had been seen the previous day (1/12/19) in the ED complaining of dysuria and "brown" colored urine. He endorsed testicular pain that had been present for roughly 1 week. At that time urinalysis showed WBC's and occasional bacteria, cultures are still pending. He was started on cipro for presumptive prostatitis and discharged home. The patient had two doses of cipro total at home before representing to the ED. A this time he was endorsing dizziness, fatigue, and near syncope. He endorsed fevers, chills, rigors, and diarrhea at home. The patient states that he had been having diarrhea for roughly 4 days or so prior to presenting. Patient describes watery brown stools roughly every 2 hours. He denies nausea, vomiting, or abdominal pain. The patient has had poor PO intake over those few days, eating and drinking very little because he did not want to have more diarrhea. He denies sick contacts. At this time the patient denies dysuria, hematuria, pyuria, penial discharge, scrotal pain or swelling. He states he notices a pain in his perineal area when he sits and he does endorse mild low back pain across his lower back. Patient endorsing cough for past week productive of thick white " sputum as well. The patient is sexually active with one long term partner and does not use condoms. He denies history of STI. The patient states he drinks 1 pint of alcohol most days for the past 10-20 years. His last drink was 4 days ago.    In the ED the patient was hypotensive, tachyonic, and tachycardic. Troponin was elevated and lactate was 3.4. The patient denied chest pain. He was given rocephin and 3L fluid resuscitation. Critical care was consulted for possible septic shock. The patient's initial blood pressure was 90/50 with a pulse rate of 152 and he was hypoxic (satting in the 80's). Patient's vitals improved with fluid resuscitation and supplemental oxygen. Lactate trended down to 1.6. Antibiotics were broadened to vancomycin and zosyn and C.diff was sent. Patient determined stable for floor and patient was admitted hospital medicine for further treatment of severe sepsis.      History reviewed. No pertinent past medical history.    Past Surgical History:   Procedure Laterality Date    SKIN GRAFT         Review of patient's allergies indicates:  No Known Allergies    No current facility-administered medications on file prior to encounter.      Current Outpatient Medications on File Prior to Encounter   Medication Sig    ciprofloxacin HCl (CIPRO) 500 MG tablet Take 1 tablet (500 mg total) by mouth 2 (two) times daily. for 14 days     Family History     None        Tobacco Use    Smoking status: Current Every Day Smoker     Packs/day: 0.50    Smokeless tobacco: Never Used   Substance and Sexual Activity    Alcohol use: Yes     Alcohol/week: 6.0 oz     Types: 10 Cans of beer per week    Drug use: No    Sexual activity: Not on file     Review of Systems   Constitutional: Positive for chills, fatigue and fever. Negative for unexpected weight change.   HENT: Negative for sinus pressure, sinus pain, sneezing and sore throat.    Eyes: Negative for visual disturbance.   Respiratory: Positive for cough and  shortness of breath. Negative for wheezing.    Cardiovascular: Negative for chest pain, palpitations and leg swelling.   Gastrointestinal: Positive for diarrhea. Negative for abdominal distention, abdominal pain, blood in stool, constipation, nausea and vomiting.   Genitourinary: Positive for dysuria. Negative for difficulty urinating, discharge, flank pain, hematuria, scrotal swelling and testicular pain.   Musculoskeletal: Positive for back pain. Negative for arthralgias and myalgias.   Skin: Negative for pallor and rash.   Neurological: Positive for light-headedness. Negative for syncope, weakness and headaches.   Hematological: Negative for adenopathy.   Psychiatric/Behavioral: Negative for confusion.     Objective:     Vital Signs (Most Recent):  Temp: 99.2 °F (37.3 °C) (01/13/19 1844)  Pulse: 81 (01/14/19 0032)  Resp: (!) 28 (01/13/19 2046)  BP: 122/70 (01/14/19 0032)  SpO2: (!) 94 % (01/14/19 0032) Vital Signs (24h Range):  Temp:  [98 °F (36.7 °C)-100.6 °F (38.1 °C)] 99.2 °F (37.3 °C)  Pulse:  [] 81  Resp:  [14-41] 28  SpO2:  [85 %-100 %] 94 %  BP: ()/(46-70) 122/70     Weight: 86.2 kg (190 lb)  Body mass index is 28.06 kg/m².    Physical Exam   Constitutional: He is oriented to person, place, and time. He appears well-developed and well-nourished. No distress.   HENT:   Head: Normocephalic and atraumatic.   Mouth/Throat: No oropharyngeal exudate.   Eyes: Conjunctivae and EOM are normal. Pupils are equal, round, and reactive to light.   Neck: Normal range of motion. Neck supple. No JVD present.   Cardiovascular: Normal rate, regular rhythm and normal heart sounds. Exam reveals no gallop and no friction rub.   No murmur heard.  Pulmonary/Chest: Effort normal and breath sounds normal. He has no wheezes. He has no rales.   Abdominal: Soft. Bowel sounds are normal. He exhibits no mass. There is no tenderness. There is no guarding.   Genitourinary: Penis normal. No penile tenderness.   Genitourinary  Comments: Prostate mildly enlarged, not boggy, no tenderness to palpation, no nodules   Musculoskeletal: Normal range of motion. He exhibits no edema.   Lymphadenopathy:     He has no cervical adenopathy.   Neurological: He is alert and oriented to person, place, and time.   Skin: Skin is warm and dry.   Nursing note and vitals reviewed.        CRANIAL NERVES     CN III, IV, VI   Pupils are equal, round, and reactive to light.  Extraocular motions are normal.        Significant Labs:   CBC:   Recent Labs   Lab 01/13/19  1627 01/13/19  2141   WBC 13.98*  --    HGB 13.5*  --    HCT 38.7* 34*     --      CMP:   Recent Labs   Lab 01/13/19  1627      K 2.9*      CO2 18*   *   BUN 18   CREATININE 1.7*   CALCIUM 8.8   PROT 7.4   ALBUMIN 3.0*   BILITOT 1.6*   ALKPHOS 202*   AST 26   ALT 21   ANIONGAP 16   EGFRNONAA 41.1*     Cardiac Markers:   Recent Labs   Lab 01/13/19  1627   *     Lactic Acid:   Recent Labs   Lab 01/13/19  1627 01/13/19  1919   LACTATE 3.4* 1.6     Magnesium:   Recent Labs   Lab 01/13/19  1627   MG 1.6     Urine Studies:   Recent Labs   Lab 01/13/19  1938   COLORU Yellow   APPEARANCEUA Hazy*   PHUR 5.0   SPECGRAV 1.010   PROTEINUA Negative   GLUCUA Negative   KETONESU Negative   BILIRUBINUA Negative   OCCULTUA 1+*   NITRITE Negative   LEUKOCYTESUR 1+*   RBCUA 7*   WBCUA 17*   BACTERIA Rare   SQUAMEPITHEL 4     All pertinent labs within the past 24 hours have been reviewed.    Significant Imaging: I have reviewed all pertinent imaging results/findings within the past 24 hours.       Details     Reading Physician Reading Date Result Priority   Jose M Armijo Jr., MD 1/13/2019       Narrative     EXAMINATION:  XR CHEST AP PORTABLE    CLINICAL HISTORY:  Sepsis;    TECHNIQUE:  Single frontal view of the chest was performed.    COMPARISON:  December 2012.    FINDINGS:  Heart size and pulmonary vessels are similar.  The lungs fairly well aerated.  Few increased markings at the  bases unchanged.      Impression       No detrimental change.  No acute abnormality.      Electronically signed by: Jose M Armijo MD  Date: 01/13/2019  Time: 16:37               Assessment/Plan:     * Severe sepsis    Patient with recently diagnosed UTI, started on outpatient antibiotics the day prior to admission. He has been endorsing significant diarrhea for the past 4 days as well as decreased PO intake. Patient afebrile at initial presentation on 1/12/19, now found to be febrile, tachycardic, and hypotensive. Lactate elevated at 3.4. Sepsis protocol initiated, given rocephin and IVF resuscitation, eventually broadened out to vancomycin and zosyn. Patient hypoxic, requiring 3L nasal cannula. Patient with perineal tenderness, no obvious sign of Vicky's or cellulitis. No testicular swelling or prostate tenderness.     Unclear source. Patient with UTI, initial culture still pending however repeat UA in ED at admission improved from previous (17 WBC and rare bacteria). CXR negative for consolidation. Patient with reproducible perineal pain however no overlying cellulitis or crepitus. Patient has been having concurrent diarrhea for the past several days as well. Potassium, phosphorus, and magnesium all low on presentation to ED. Patient's hypotension possibly 2/2 dehydration in combination with urosepsis.     SIRS Criteria: Fever (100.6), WBC (13.9), Tachycardia (152), Tachypnea (30)    Antibiotics: Vancomycin 15 mg/kg q24h, zosyn 4.5g q8h    Plan:  - CT abdomen pelvis and Scrotal ultrasound ordered  - Continue antibiotics with aim to deescalate pending sensitivities of urine cultures  - Blood cultures pending  - C.diff pending  - Patient continuing to have diarrhea, will continue maintenance fluids NS@125/hr. Encouraging PO intake  - Replete electrolytes as needed         Acute respiratory failure with hypoxia    - Patient with severe sepsis not on home oxygen but requiring 3L nasal cannula in ED. CXR showing no  "sign of intrapulmonary pathology. Well's score is 0. Patient not complaining of chest pain.    - Supplemental oxygen as needed for O2 sat L> 88%  - will wean to room air as tolerated       JUAN (acute kidney injury)    Patient presents with Cr 1.7. Baseline appears to be around 1.1 per chart review. Has been endorsing poor PO intake in combination with severe diarrhea over past several days.    - Likely prerenal, given IVF's in ED and getting continuous fluids on floor  - Will follow Cr with daily labs for improvement  - If not improvement will consider urine studies to further investigate       Hypotension    - see "sever sepsis" above       Discharge planning issues    Patient lives at home with another family member, able to complete ADL's without difficulty    - PT/OT consulted  - Social work consulted       Acute cystitis without hematuria    Patient diagnosed with UTI in ED on 1/12/19    - Urinalysis notable for WBCs and bacteria. Prescribed Cipro, patient took two doses, and reported improvement in perineal pain with medication  - Now presenting with sepsis, likely combined with dehydration 2/2 concomitant diarrheal illness  - Prostate nontender, firm on exam  - Now on broad spectrum antibiotics, cultures pending.     Continuing broad spectrum antibiotics with aim to deescalate pending sensitivities     Essential hypertension    Patient diagnosed with hypertension within the past year. Patient states he is on an antihypertensive at home but is unclear of the name. No documentation in chart as to what patient is taking. Patient states he has not taken the medication for several days and often forgets dose.    - given hypotension requiring IVF resuscitation, will continue holding any antihypertensives       Elevated troponin    Patient presents with troponin of 0.034 on admission. Repeat increased to 0.101. EKG showing sinus tachycardia, no ST segment changes. Trended down to 0.081 following fluid resuscitation. " Patient has not complained of chest pain since presentation.    - Troponin peaked at 0.101, no trending down. No need to further trend  - Likely demand ischemia 2/2 severe sepsis  - Will continue to monitor patient         VTE Risk Mitigation (From admission, onward)        Ordered     Place sequential compression device  Until discontinued      01/13/19 2327     IP VTE LOW RISK PATIENT  Once      01/13/19 2327         Staff attestation to follow    Morgan Moeller MD  Department of Hospital Medicine   [facility]

## 2019-01-14 NOTE — SUBJECTIVE & OBJECTIVE
History reviewed. No pertinent past medical history.    Past Surgical History:   Procedure Laterality Date    SKIN GRAFT         Review of patient's allergies indicates:  No Known Allergies    No current facility-administered medications on file prior to encounter.      Current Outpatient Medications on File Prior to Encounter   Medication Sig    ciprofloxacin HCl (CIPRO) 500 MG tablet Take 1 tablet (500 mg total) by mouth 2 (two) times daily. for 14 days     Family History     None        Tobacco Use    Smoking status: Current Every Day Smoker     Packs/day: 0.50    Smokeless tobacco: Never Used   Substance and Sexual Activity    Alcohol use: Yes     Alcohol/week: 6.0 oz     Types: 10 Cans of beer per week    Drug use: No    Sexual activity: Not on file     Review of Systems   Constitutional: Positive for chills, fatigue and fever. Negative for unexpected weight change.   HENT: Negative for sinus pressure, sinus pain, sneezing and sore throat.    Eyes: Negative for visual disturbance.   Respiratory: Positive for cough and shortness of breath. Negative for wheezing.    Cardiovascular: Negative for chest pain, palpitations and leg swelling.   Gastrointestinal: Positive for diarrhea. Negative for abdominal distention, abdominal pain, blood in stool, constipation, nausea and vomiting.   Genitourinary: Positive for dysuria. Negative for difficulty urinating, discharge, flank pain, hematuria, scrotal swelling and testicular pain.   Musculoskeletal: Positive for back pain. Negative for arthralgias and myalgias.   Skin: Negative for pallor and rash.   Neurological: Positive for light-headedness. Negative for syncope, weakness and headaches.   Hematological: Negative for adenopathy.   Psychiatric/Behavioral: Negative for confusion.     Objective:     Vital Signs (Most Recent):  Temp: 99.2 °F (37.3 °C) (01/13/19 1844)  Pulse: 81 (01/14/19 0032)  Resp: (!) 28 (01/13/19 2046)  BP: 122/70 (01/14/19 0032)  SpO2: (!) 94 %  (01/14/19 0032) Vital Signs (24h Range):  Temp:  [98 °F (36.7 °C)-100.6 °F (38.1 °C)] 99.2 °F (37.3 °C)  Pulse:  [] 81  Resp:  [14-41] 28  SpO2:  [85 %-100 %] 94 %  BP: ()/(46-70) 122/70     Weight: 86.2 kg (190 lb)  Body mass index is 28.06 kg/m².    Physical Exam   Constitutional: He is oriented to person, place, and time. He appears well-developed and well-nourished. No distress.   HENT:   Head: Normocephalic and atraumatic.   Mouth/Throat: No oropharyngeal exudate.   Eyes: Conjunctivae and EOM are normal. Pupils are equal, round, and reactive to light.   Neck: Normal range of motion. Neck supple. No JVD present.   Cardiovascular: Normal rate, regular rhythm and normal heart sounds. Exam reveals no gallop and no friction rub.   No murmur heard.  Pulmonary/Chest: Effort normal and breath sounds normal. He has no wheezes. He has no rales.   Abdominal: Soft. Bowel sounds are normal. He exhibits no mass. There is no tenderness. There is no guarding.   Genitourinary: Penis normal. No penile tenderness.   Genitourinary Comments: Prostate mildly enlarged, not boggy, no tenderness to palpation, no nodules   Musculoskeletal: Normal range of motion. He exhibits no edema.   Lymphadenopathy:     He has no cervical adenopathy.   Neurological: He is alert and oriented to person, place, and time.   Skin: Skin is warm and dry.   Nursing note and vitals reviewed.        CRANIAL NERVES     CN III, IV, VI   Pupils are equal, round, and reactive to light.  Extraocular motions are normal.        Significant Labs:   CBC:   Recent Labs   Lab 01/13/19  1627 01/13/19  2141   WBC 13.98*  --    HGB 13.5*  --    HCT 38.7* 34*     --      CMP:   Recent Labs   Lab 01/13/19  1627      K 2.9*      CO2 18*   *   BUN 18   CREATININE 1.7*   CALCIUM 8.8   PROT 7.4   ALBUMIN 3.0*   BILITOT 1.6*   ALKPHOS 202*   AST 26   ALT 21   ANIONGAP 16   EGFRNONAA 41.1*     Cardiac Markers:   Recent Labs   Lab  01/13/19  1627   *     Lactic Acid:   Recent Labs   Lab 01/13/19  1627 01/13/19  1919   LACTATE 3.4* 1.6     Magnesium:   Recent Labs   Lab 01/13/19  1627   MG 1.6     Urine Studies:   Recent Labs   Lab 01/13/19  1938   COLORU Yellow   APPEARANCEUA Hazy*   PHUR 5.0   SPECGRAV 1.010   PROTEINUA Negative   GLUCUA Negative   KETONESU Negative   BILIRUBINUA Negative   OCCULTUA 1+*   NITRITE Negative   LEUKOCYTESUR 1+*   RBCUA 7*   WBCUA 17*   BACTERIA Rare   SQUAMEPITHEL 4     All pertinent labs within the past 24 hours have been reviewed.    Significant Imaging: I have reviewed all pertinent imaging results/findings within the past 24 hours.       Details     Reading Physician Reading Date Result Priority   Jose M Armijo Jr., MD 1/13/2019       Narrative     EXAMINATION:  XR CHEST AP PORTABLE    CLINICAL HISTORY:  Sepsis;    TECHNIQUE:  Single frontal view of the chest was performed.    COMPARISON:  December 2012.    FINDINGS:  Heart size and pulmonary vessels are similar.  The lungs fairly well aerated.  Few increased markings at the bases unchanged.      Impression       No detrimental change.  No acute abnormality.      Electronically signed by: Jose M Armijo MD  Date: 01/13/2019  Time: 16:37

## 2019-01-14 NOTE — ASSESSMENT & PLAN NOTE
-elevated troponin (0.03 on initial presentation to 0.101 on next draw)  -no focal ST changes on my review of EKG  -ddx is MI (although unlikely as patient was not having any CP), demand ischemia (more likely as he was hypotensives with SBP in low 80s on presentation) vs renal impairment (JUAN)    Recommendations  -get a third troponin to trend   -monitor patient for CP and repeat EKG if endorsing any MI syptoms  -consider Echo (as patient has long standing untreated HTN and slightly elevated BNP)

## 2019-01-14 NOTE — HPI
"Mr Friedman is a 68 year old male with HTN (dx in the past year, no medication)  who presents to the ED with dizziness.     Patient states that he began having sever diarrhea on 1/9/19. Since then he has been unable to keep anything down.  He states that his diarrhea got worseon 1/11 ("I was going all night"). . Also endorsing urinary frequency, dysuria, and urgency.     Patient came to the ED 1/12 with urinary symptoms and fatigue. He was treated for prostatitis with Cipro (has taken 2 doses). This morning he awoke feeling more fatigued, short of breath, and dizzy described as near syncopal. Endorses fever, chills, and rigors.  Denies URI symptoms, chest pain, vomiting, abdominal pain, gross hematuria, melena or hematochezia. No prior abdominal surgeries. No known sick contacts or recent travel. Has mild low back pain across his back. His brother brings him to the ED for further evaluation. In the ED today (1/13) patient had hypotension, tachypnea and hypokalemia.  He additionally had an elevated troponin. ICU was initially consulted for sepsis and hypotension.     Mr. Friedman given IV fluid boluses to treat hypotension and shortly after developed some respiratory distress requiring application of BiPAP and a subsequent second critical care consult. Simultaneously he was given lasix. Approximately an hour later, he has appropriately urinated in response to the lasix and no longer requires BIPAP. His oxygenation was weaned down to facemask and he denies shortness of breath. Because he is returning to baseline, admission to MICU no longer necessary.     Thank you for the consult. We will sign off at this time.                       "

## 2019-01-14 NOTE — PLAN OF CARE
1/13/2019 stool specimen negative for C difficile, isolation may be discontinued from and Infection Prevention standpoint.

## 2019-01-14 NOTE — PLAN OF CARE
Problem: Adult Inpatient Plan of Care  Goal: Plan of Care Review  Outcome: Ongoing (interventions implemented as appropriate)  Pt free of falls, injury this shift. POC reviewed with pt at bedside. Weaning O2 as tolerated, currently on 8L/31% on VentiMask, spO2 92-93%. IV abx continued. Anaerobic blood cultures (+) for gram (+) cocci in clusters resembling MD hans notified. C. Diff negative. Diuresing with IVP Lasix BID, tolerating well. UOP color improving, scant amount of blood noted. VSS, NAD noted. Will continue to monitor.

## 2019-01-14 NOTE — ASSESSMENT & PLAN NOTE
Patient presents with troponin of 0.034 on admission. Repeat increased to 0.101. EKG showing sinus tachycardia, no ST segment changes. Trended down to 0.081 following fluid resuscitation. Patient has not complained of chest pain since presentation.    - Troponin peaked at 0.101, no trending down. No need to further trend  - Likely demand ischemia 2/2 severe sepsis  - Will continue to monitor patient

## 2019-01-14 NOTE — ASSESSMENT & PLAN NOTE
-hypotensive, febrile, tachycardic, tachynic and elevated WBC on presentation (4/4 SIRS); 2/3 qsofa (tachypnea and hypotension)  -ddx of sepsis source: prostatitis (translocation of bacteria after prostate exam yesterday) vs pyelonephritis vs testicular abscess vs c-diff infection   -unlikely to be a respiratory source (flu negative and CXR does not show a consolidation)  -hypotension has since resolved  -patient responded well to fluids   -did not require pressor support  -currently afebrile, normotensive with a normal HR    Recommendations  -broaden out antibiotics to Vanc and Zosyn  -CBC daily to monitor WBC  -blood cultures x2  -f/u on urine cultures  -f/u on cdiff studies  -consider getting a testicular US, as patient was tender on exam (US necessary to r/o abscess, and antibiotics wont penetrate the abscess)

## 2019-01-14 NOTE — CONSULTS
"[facility]  Critical Care Medicine  Consult Note    Patient Name: Brian Friedman  MRN: [id number]  Admission Date: 1/13/2019  Hospital Length of Stay: 1 days  Code Status: Full Code  Attending Physician: Mario Hernandez MD   Primary Care Provider: Primary Doctor No   Principal Problem: Severe sepsis    Inpatient consult to Critical Care Medicine  Consult performed by: Arnaldo Lezama NP  Consult ordered by: Morgan Moeller MD  Reason for consult: respiratory distress  Assessment/Recommendations: Continue diuresis  Wean O2 to keep sats >88%        Subjective:     HPI:  Mr Friedman is a 68 year old male with HTN (dx in the past year, no medication)  who presents to the ED with dizziness.     Patient states that he began having sever diarrhea on 1/9/19. Since then he has been unable to keep anything down.  He states that his diarrhea got worseon 1/11 ("I was going all night"). . Also endorsing urinary frequency, dysuria, and urgency.     Patient came to the ED 1/12 with urinary symptoms and fatigue. He was treated for prostatitis with Cipro (has taken 2 doses). This morning he awoke feeling more fatigued, short of breath, and dizzy described as near syncopal. Endorses fever, chills, and rigors.  Denies URI symptoms, chest pain, vomiting, abdominal pain, gross hematuria, melena or hematochezia. No prior abdominal surgeries. No known sick contacts or recent travel. Has mild low back pain across his back. His brother brings him to the ED for further evaluation. In the ED today (1/13) patient had hypotension, tachypnea and hypokalemia.  He additionally had an elevated troponin. ICU was initially consulted for sepsis and hypotension.     Mr. Friedman given IV fluid boluses to treat hypotension and shortly after developed some respiratory distress requiring application of BiPAP and a subsequent second critical care consult. Simultaneously he was given lasix. Approximately an hour later, he has " appropriately urinated in response to the lasix and no longer requires BIPAP. His oxygenation was weaned down to facemask and he denies shortness of breath. Because he is returning to baseline, admission to MICU no longer necessary.     Thank you for the consult. We will sign off at this time.                         Hospital/ICU Course:  No notes on file    Past Medical History:   Diagnosis Date    Essential hypertension 1/14/2019       Past Surgical History:   Procedure Laterality Date    SKIN GRAFT         Review of patient's allergies indicates:  No Known Allergies    Family History     None        Tobacco Use    Smoking status: Current Every Day Smoker     Packs/day: 0.50    Smokeless tobacco: Never Used   Substance and Sexual Activity    Alcohol use: Yes     Alcohol/week: 6.0 oz     Types: 10 Cans of beer per week    Drug use: No    Sexual activity: Not on file      Review of Systems   Constitutional: Negative for diaphoresis and fatigue.   HENT: Negative for sore throat and trouble swallowing.    Respiratory: Positive for shortness of breath. Negative for cough, chest tightness and wheezing.    Cardiovascular: Positive for leg swelling. Negative for chest pain and palpitations.   Gastrointestinal: Positive for diarrhea. Negative for nausea and vomiting.   Genitourinary: Negative for dysuria and hematuria.   Neurological: Positive for weakness. Negative for headaches.     Objective:     Vital Signs (Most Recent):  Temp: 98 °F (36.7 °C) (01/14/19 1100)  Pulse: 84 (01/14/19 1100)  Resp: 18 (01/14/19 1100)  BP: 130/72 (01/14/19 1100)  SpO2: (!) 92 % (01/14/19 1100) Vital Signs (24h Range):  Temp:  [98 °F (36.7 °C)-100.6 °F (38.1 °C)] 98 °F (36.7 °C)  Pulse:  [] 84  Resp:  [14-65] 18  SpO2:  [85 %-100 %] 92 %  BP: ()/(46-72) 130/72   Weight: 84.4 kg (186 lb 1.1 oz)  Body mass index is 27.48 kg/m².      Intake/Output Summary (Last 24 hours) at 1/14/2019 1530  Last data filed at 1/14/2019  1200  Gross per 24 hour   Intake 4200 ml   Output 2450 ml   Net 1750 ml       Physical Exam   Constitutional: He is oriented to person, place, and time. Vital signs are normal. He appears well-developed. He is cooperative. No distress. Face mask in place.   HENT:   Head: Normocephalic and atraumatic.   Eyes: Conjunctivae are normal. Pupils are equal, round, and reactive to light. Right eye exhibits no exudate. Left eye exhibits no exudate. Right conjunctiva has no hemorrhage. Left conjunctiva has no hemorrhage. Scleral icterus is present. Right eye exhibits no nystagmus. Left eye exhibits no nystagmus.   Neck: Trachea normal. No neck rigidity. No tracheal deviation present.   Cardiovascular: Normal rate, regular rhythm and normal heart sounds. PMI is not displaced. Exam reveals no gallop and no friction rub.   No murmur heard.  Pulses:       Radial pulses are 2+ on the right side, and 2+ on the left side.        Dorsalis pedis pulses are 2+ on the right side, and 2+ on the left side.   Pulmonary/Chest: Effort normal. No accessory muscle usage. Tachypnea noted. No respiratory distress. He has wheezes in the right lower field and the left lower field. He has no rhonchi. He has rales in the left lower field.   Abdominal: Soft. Normal appearance and bowel sounds are normal. There is no tenderness.   Musculoskeletal: Normal range of motion.   Neurological: He is alert and oriented to person, place, and time. No cranial nerve deficit. GCS eye subscore is 4. GCS verbal subscore is 5. GCS motor subscore is 6.   Skin: Skin is warm and dry. Capillary refill takes 2 to 3 seconds. He is not diaphoretic. No cyanosis. Nails show no clubbing.   Psychiatric: He has a normal mood and affect. His speech is normal and behavior is normal.   Nursing note and vitals reviewed.      Vents:  Oxygen Concentration (%): 31 (01/14/19 1100)  Lines/Drains/Airways     Peripheral Intravenous Line                 Peripheral IV - Single Lumen  01/13/19 1626 Right Upper Arm less than 1 day         Peripheral IV - Single Lumen 01/13/19 1630 Left Forearm less than 1 day              Significant Labs:    CBC/Anemia Profile:  Recent Labs   Lab 01/13/19  1627 01/13/19  2141 01/14/19  0613 01/14/19  1016   WBC 13.98*  --  14.00*  --    HGB 13.5*  --  14.8  --    HCT 38.7* 34* 43.3  --      --  183  --    *  --  102*  --    RDW 12.8  --  13.0  --    FOLATE  --   --   --  8.5   [id number]  --   --   --  529        Chemistries:  Recent Labs   Lab 01/13/19  1627 01/14/19  0613    142   K 2.9* 3.4*    111*   CO2 18* 18*   BUN 18 14   CREATININE 1.7* 1.2   CALCIUM 8.8 9.0   ALBUMIN 3.0* 3.1*   PROT 7.4 8.1   BILITOT 1.6* 1.5*   ALKPHOS 202* 192*   ALT 21 28   AST 26 43*   MG 1.6  --    PHOS 1.1* 3.4       All pertinent labs within the past 24 hours have been reviewed.    Significant Imaging: I have reviewed all pertinent imaging results/findings within the past 24 hours.      ABG  Recent Labs   Lab 01/14/19  0556   PH 7.270*   PO2 117*   PCO2 42.1   HCO3 19.3*   BE -8     Assessment/Plan:     Pulmonary   Acute respiratory failure with hypoxia    --likely secondary to IV fluid boluses given for hypotension  --wean O2 to keep sats >88%  --continue lasix for volume removal  --follow up with 2D echo for possible heart failure       ID   * Severe sepsis    -hypotensive, febrile, tachycardic, tachynic and elevated WBC on presentation (4/4 SIRS); 2/3 qsofa (tachypnea and hypotension)  -ddx of sepsis source: prostatitis (translocation of bacteria after prostate exam yesterday) vs pyelonephritis vs testicular abscess vs c-diff infection   -unlikely to be a respiratory source (flu negative and CXR does not show a consolidation)  -hypotension has since resolved  -patient responded well to fluids   -did not require pressor support  -currently afebrile, normotensive with a normal HR    Recommendations  -broaden out antibiotics to Vanc and Zosyn  -CBC  daily to monitor WBC  -blood cultures x2  -f/u on urine cultures  -f/u on cdiff studies  -consider getting a testicular US, as patient was tender on exam (US necessary to r/o abscess, and antibiotics wont penetrate the abscess)              Critical Care Time: 40 minutes  Critical secondary to Patient has a condition that poses threat to life and bodily function: Severe Respiratory Distress     Critical care was time spent personally by me on the following activities: development of treatment plan with patient or surrogate and bedside caregivers, discussions with consultants, evaluation of patient's response to treatment, examination of patient, ordering and performing treatments and interventions, ordering and review of laboratory studies, ordering and review of radiographic studies, pulse oximetry, re-evaluation of patient's condition. This critical care time did not overlap with that of any other provider or involve time for any procedures.    Thank you for your consult. I will sign off. Please contact us if you have any additional questions.     Above plan discussed with Dr. Chetna Lezama, NP  Critical Care Medicine  [facility]

## 2019-01-14 NOTE — SUBJECTIVE & OBJECTIVE
Past Medical History:   Diagnosis Date    Essential hypertension 1/14/2019       Past Surgical History:   Procedure Laterality Date    SKIN GRAFT         Review of patient's allergies indicates:  No Known Allergies    Family History     None        Tobacco Use    Smoking status: Current Every Day Smoker     Packs/day: 0.50    Smokeless tobacco: Never Used   Substance and Sexual Activity    Alcohol use: Yes     Alcohol/week: 6.0 oz     Types: 10 Cans of beer per week    Drug use: No    Sexual activity: Not on file      Review of Systems   Constitutional: Negative for diaphoresis and fatigue.   HENT: Negative for sore throat and trouble swallowing.    Respiratory: Positive for shortness of breath. Negative for cough, chest tightness and wheezing.    Cardiovascular: Positive for leg swelling. Negative for chest pain and palpitations.   Gastrointestinal: Positive for diarrhea. Negative for nausea and vomiting.   Genitourinary: Negative for dysuria and hematuria.   Neurological: Positive for weakness. Negative for headaches.     Objective:     Vital Signs (Most Recent):  Temp: 98 °F (36.7 °C) (01/14/19 1100)  Pulse: 84 (01/14/19 1100)  Resp: 18 (01/14/19 1100)  BP: 130/72 (01/14/19 1100)  SpO2: (!) 92 % (01/14/19 1100) Vital Signs (24h Range):  Temp:  [98 °F (36.7 °C)-100.6 °F (38.1 °C)] 98 °F (36.7 °C)  Pulse:  [] 84  Resp:  [14-65] 18  SpO2:  [85 %-100 %] 92 %  BP: ()/(46-72) 130/72   Weight: 84.4 kg (186 lb 1.1 oz)  Body mass index is 27.48 kg/m².      Intake/Output Summary (Last 24 hours) at 1/14/2019 1530  Last data filed at 1/14/2019 1200  Gross per 24 hour   Intake 4200 ml   Output 2450 ml   Net 1750 ml       Physical Exam   Constitutional: He is oriented to person, place, and time. Vital signs are normal. He appears well-developed. He is cooperative. No distress. Face mask in place.   HENT:   Head: Normocephalic and atraumatic.   Eyes: Conjunctivae are normal. Pupils are equal, round, and  reactive to light. Right eye exhibits no exudate. Left eye exhibits no exudate. Right conjunctiva has no hemorrhage. Left conjunctiva has no hemorrhage. Scleral icterus is present. Right eye exhibits no nystagmus. Left eye exhibits no nystagmus.   Neck: Trachea normal. No neck rigidity. No tracheal deviation present.   Cardiovascular: Normal rate, regular rhythm and normal heart sounds. PMI is not displaced. Exam reveals no gallop and no friction rub.   No murmur heard.  Pulses:       Radial pulses are 2+ on the right side, and 2+ on the left side.        Dorsalis pedis pulses are 2+ on the right side, and 2+ on the left side.   Pulmonary/Chest: Effort normal. No accessory muscle usage. Tachypnea noted. No respiratory distress. He has wheezes in the right lower field and the left lower field. He has no rhonchi. He has rales in the left lower field.   Abdominal: Soft. Normal appearance and bowel sounds are normal. There is no tenderness.   Musculoskeletal: Normal range of motion.   Neurological: He is alert and oriented to person, place, and time. No cranial nerve deficit. GCS eye subscore is 4. GCS verbal subscore is 5. GCS motor subscore is 6.   Skin: Skin is warm and dry. Capillary refill takes 2 to 3 seconds. He is not diaphoretic. No cyanosis. Nails show no clubbing.   Psychiatric: He has a normal mood and affect. His speech is normal and behavior is normal.   Nursing note and vitals reviewed.      Vents:  Oxygen Concentration (%): 31 (01/14/19 1100)  Lines/Drains/Airways     Peripheral Intravenous Line                 Peripheral IV - Single Lumen 01/13/19 1626 Right Upper Arm less than 1 day         Peripheral IV - Single Lumen 01/13/19 1630 Left Forearm less than 1 day              Significant Labs:    CBC/Anemia Profile:  Recent Labs   Lab 01/13/19  1627 01/13/19  2141 01/14/19  0613 01/14/19  1016   WBC 13.98*  --  14.00*  --    HGB 13.5*  --  14.8  --    HCT 38.7* 34* 43.3  --      --  183  --    MCV  101*  --  102*  --    RDW 12.8  --  13.0  --    FOLATE  --   --   --  8.5   [id number]  --   --   --  529        Chemistries:  Recent Labs   Lab 01/13/19  1627 01/14/19  0613    142   K 2.9* 3.4*    111*   CO2 18* 18*   BUN 18 14   CREATININE 1.7* 1.2   CALCIUM 8.8 9.0   ALBUMIN 3.0* 3.1*   PROT 7.4 8.1   BILITOT 1.6* 1.5*   ALKPHOS 202* 192*   ALT 21 28   AST 26 43*   MG 1.6  --    PHOS 1.1* 3.4       All pertinent labs within the past 24 hours have been reviewed.    Significant Imaging: I have reviewed all pertinent imaging results/findings within the past 24 hours.

## 2019-01-14 NOTE — HPI
"Mr. Friedman is a 68 year old male with a history of HTN who presented to [facility] ED for evaluation of dizziness. The patient had been seen the previous day (1/12/19) in the ED complaining of dysuria and "brown" colored urine. He endorsed testicular pain that had been present for roughly 1 week. At that time urinalysis showed WBC's and occasional bacteria, cultures are still pending. He was started on cipro for presumptive prostatitis and discharged home. The patient had two doses of cipro total at home before representing to the ED. A this time he was endorsing dizziness, fatigue, and near syncope. He endorsed fevers, chills, rigors, and diarrhea at home. The patient states that he had been having diarrhea for roughly 4 days or so prior to presenting. Patient describes watery brown stools roughly every 2 hours. He denies nausea, vomiting, or abdominal pain. The patient has had poor PO intake over those few days, eating and drinking very little because he did not want to have more diarrhea. He denies sick contacts. At this time the patient denies dysuria, hematuria, pyuria, penial discharge, scrotal pain or swelling. He states he notices a pain in his perineal area when he sits and he does endorse mild low back pain across his lower back. Patient endorsing cough for past week productive of thick white sputum as well. The patient is sexually active with one long term partner and does not use condoms. He denies history of STI. The patient states he drinks 1 pint of alcohol most days for the past 10-20 years. His last drink was 4 days ago.    In the ED the patient was hypotensive, tachyonic, and tachycardic. Troponin was elevated and lactate was 3.4. The patient denied chest pain. He was given rocephin and 3L fluid resuscitation. Critical care was consulted for possible septic shock. The patient's initial blood pressure was 90/50 with a pulse rate of 152 and he was hypoxic (satting in the 80's). Patient's vitals improved " with fluid resuscitation and supplemental oxygen. Lactate trended down to 1.6. Antibiotics were broadened to vancomycin and zosyn and C.diff was sent. Patient determined stable for floor and patient was admitted hospital medicine for further treatment of severe sepsis.

## 2019-01-14 NOTE — ED NOTES
Assumed patient care.     Pt resting on stretcher, venti-mask in place. Stretcher locked and in lowest position, side rails x 2, call bell within reach. Cardiac monitor, pulse ox and BP cuff applied cycling Q 30 minute vitals. Pt states no needs at this time, updated in wait for report to be called to admit floor.    LOC: The patient is awake, alert and aware of environment with an appropriate affect, the patient is oriented x 3 and speaking appropriately.  APPEARANCE: Patient resting, patient is clean and well groomed, pt presents in hospital gown  SKIN: The skin is warm and dry, color consistent with ethnicity, skin intact, no breakdown or bruising noted.  MUSCULOSKELETAL: Patient moving all extremities well, no obvious swelling or deformities noted.   RESPIRATORY: Airway is open and patent, clear breath sounds throughout lung fields; pt placed on venti-mask 50%, 15 liters, oxygen saturation 92%  CARDIAC: Patient has no peripheral edema noted, capillary refill < 3 seconds. No complaints of chest pain at this time.   ABDOMEN: Soft and non tender to palpation, no distention noted. Bowel sounds present x 4  NEUROLOGIC: PERRL, 3 mm bilaterally, eyes open spontaneously, behavior appropriate to situation, follows commands, facial expression symmetrical, purposeful motor response noted, normal sensation in all extremities when touched with a finger.

## 2019-01-14 NOTE — ED NOTES
Patient resting in bed currently, in no apparent distress.  Nurse and wife just helped with bed pan, his bowel movements remain brown watery and loose without foul odor.  He has had four episodes of watery stool in the last 5 hours.  He remains on cardiac, blood pressure, and pulse ox monitoring, call light in reach.  Will continue to monitor.

## 2019-01-14 NOTE — ASSESSMENT & PLAN NOTE
Patient presents with Cr 1.7. Baseline appears to be around 1.1 per chart review. Has been endorsing poor PO intake in combination with severe diarrhea over past several days.    - Likely prerenal, given IVF's in ED and getting continuous fluids on floor  - Will follow Cr with daily labs for improvement  - If not improvement will consider urine studies to further investigate

## 2019-01-14 NOTE — ED NOTES
Patient up on bedpan, still experiencing watery stools.  He requested a urinal earlier and yielded urinary output of 200 ml, urine was orange and concentrated.

## 2019-01-14 NOTE — CONSULTS
"[facility]  Critical Care Medicine  Consult Note    Patient Name: Brian Friedman  MRN: [id number]  Admission Date: 1/13/2019  Hospital Length of Stay: 0 days  Code Status: No Order  Attending Physician: Je Odonnell MD   Primary Care Provider: Primary Doctor No   Principal Problem: <principal problem not specified>    Inpatient consult to Critical Care Medicine  Consult performed by: Jeanne Saavedra MD  Consult ordered by: eRnetta Albert PA-C  Reason for consult: hypotensive with tachypnea         Subjective:     HPI:  Mr Friedman is a 68 year old male with HTN (dx in the past year, no medication)  who presents to the ED with dizziness.     Patient states that he began having sever diarrhea on 1/9/19. Since then he has been unable to keep anything down.  He states that his diarrhea got worseon 1/11 ("I was going all night"). . Also endorsing urinary frequency, dysuria, and urgency.     Patient came to the ED 1/12 with urinary symptoms and fatigue. He was treated for prostatitis with Cipro (has taken 2 doses). This morning he awoke feeling more fatigued, short of breath, and dizzy described as near syncopal. Endorses fever, chills, and rigors.  Denies URI symptoms, chest pain, vomiting, abdominal pain, gross hematuria, melena or hematochezia. No prior abdominal surgeries. No known sick contacts or recent travel. Has mild low back pain across his back. His brother brings him to the ED for further evaluation. In the ED today (1/13) patient had hypotension, tachypnea and hypokalemia.  He additionally had an elevated troponin. ICU was consulted for sepsis and hypotension.                    Hospital/ICU Course:  No notes on file    History reviewed. No pertinent past medical history.    Past Surgical History:   Procedure Laterality Date    SKIN GRAFT         Review of patient's allergies indicates:  No Known Allergies    Family History     None        Tobacco Use    Smoking status: Current " Every Day Smoker     Packs/day: 0.50    Smokeless tobacco: Never Used   Substance and Sexual Activity    Alcohol use: Yes     Alcohol/week: 6.0 oz     Types: 10 Cans of beer per week    Drug use: No    Sexual activity: Not on file      Review of Systems   Constitutional: Positive for fatigue and fever. Negative for chills.   Respiratory: Positive for shortness of breath. Negative for cough.    Gastrointestinal: Positive for diarrhea. Negative for abdominal pain, constipation and nausea.   Endocrine: Negative for polydipsia, polyphagia and polyuria.   Genitourinary: Negative for dysuria, flank pain, hematuria and urgency.   Musculoskeletal: Negative for arthralgias, neck pain and neck stiffness.   Skin: Negative for rash and wound.   Neurological: Positive for weakness. Negative for seizures and headaches.   Psychiatric/Behavioral: Negative for agitation and confusion.     Objective:     Vital Signs (Most Recent):  Temp: 99.2 °F (37.3 °C) (01/13/19 1844)  Pulse: 95 (01/13/19 2046)  Resp: (!) 28 (01/13/19 2046)  BP: (!) 94/55 (01/13/19 2046)  SpO2: 100 % (01/13/19 2016) Vital Signs (24h Range):  Temp:  [98 °F (36.7 °C)-100.6 °F (38.1 °C)] 99.2 °F (37.3 °C)  Pulse:  [] 95  Resp:  [14-41] 28  SpO2:  [85 %-100 %] 100 %  BP: ()/(46-57) 94/55   Weight: 86.2 kg (190 lb)  Body mass index is 28.06 kg/m².      Intake/Output Summary (Last 24 hours) at 1/13/2019 2120  Last data filed at 1/13/2019 1745  Gross per 24 hour   Intake 1050 ml   Output --   Net 1050 ml       Physical Exam   Constitutional: He is oriented to person, place, and time. He appears well-developed and well-nourished. No distress.   On 3L NC; normotensive   HENT:   Head: Normocephalic and atraumatic.   Eyes: EOM are normal. Pupils are equal, round, and reactive to light.   Cardiovascular: Normal rate, regular rhythm, normal heart sounds and intact distal pulses.   No murmur heard.  Pulmonary/Chest: Breath sounds normal. No respiratory distress.  He has no wheezes.   tachypnea    Abdominal: Soft. Bowel sounds are normal. He exhibits distension. There is no tenderness.   Genitourinary: Penile tenderness present.   Genitourinary Comments: No CVA tenderness   Musculoskeletal: Normal range of motion. He exhibits no edema, tenderness or deformity.   Neurological: He is alert and oriented to person, place, and time. No cranial nerve deficit. Coordination normal.   mentation appropriate   Skin: Skin is warm and dry. Capillary refill takes less than 2 seconds. No rash noted. He is not diaphoretic. No erythema.   Psychiatric: He has a normal mood and affect. His behavior is normal. Thought content normal.   Vitals reviewed.      Vents:     Lines/Drains/Airways     Peripheral Intravenous Line                 Peripheral IV - Single Lumen 01/13/19 1626 Right Upper Arm less than 1 day         Peripheral IV - Single Lumen 01/13/19 1630 Left Forearm less than 1 day              Significant Labs:    CBC/Anemia Profile:  Recent Labs   Lab 01/13/19  1627   WBC 13.98*   HGB 13.5*   HCT 38.7*      *   RDW 12.8        Chemistries:  Recent Labs   Lab 01/13/19  1627      K 2.9*      CO2 18*   BUN 18   CREATININE 1.7*   CALCIUM 8.8   ALBUMIN 3.0*   PROT 7.4   BILITOT 1.6*   ALKPHOS 202*   ALT 21   AST 26   MG 1.6   PHOS 1.1*       Blood Culture: No results for input(s): LABBLOO in the last 48 hours.  CMP:   Recent Labs   Lab 01/13/19  1627      K 2.9*      CO2 18*   *   BUN 18   CREATININE 1.7*   CALCIUM 8.8   PROT 7.4   ALBUMIN 3.0*   BILITOT 1.6*   ALKPHOS 202*   AST 26   ALT 21   ANIONGAP 16   EGFRNONAA 41.1*     Cardiac Markers: No results for input(s): CKMB, TROPONINT, MYOGLOBIN in the last 48 hours.  Urine Culture: No results for input(s): LABURIN in the last 48 hours.    Significant Imaging: I have reviewed all pertinent imaging results/findings within the past 24 hours.      ABG  Recent Labs   Lab 01/13/19  1753   PH 7.402   PO2  49   PCO2 28.6*   HCO3 17.8*   BE -7     Assessment/Plan:     Cardiac/Vascular   Elevated troponin    -elevated troponin (0.03 on initial presentation to 0.101 on next draw)  -no focal ST changes on my review of EKG  -ddx is MI (although unlikely as patient was not having any CP), demand ischemia (more likely as he was hypotensives with SBP in low 80s on presentation) vs renal impairment (JUAN)    Recommendations  -get a third troponin to trend   -monitor patient for CP and repeat EKG if endorsing any MI syptoms  -consider Echo (as patient has long standing untreated HTN and slightly elevated BNP)     ID   Sepsis    -hypotensive, febrile, tachycardic, tachynic and elevated WBC on presentation (4/4 SIRS); 2/3 qsofa (tachypnea and hypotension)  -ddx of sepsis source: prostatitis (translocation of bacteria after prostate exam yesterday) vs pyelonephritis vs testicular abscess vs c-diff infection   -unlikely to be a respiratory source (flu negative and CXR does not show a consolidation)  -hypotension has since resolved  -patient responded well to fluids   -did not require pressor support  -currently afebrile, normotensive with a normal HR    Recommendations  -broaden out antibiotics to Vanc and Zosyn  -CBC daily to monitor WBC  -blood cultures x2  -f/u on urine cultures  -f/u on cdiff studies  -consider getting a testicular US, as patient was tender on exam (US necessary to r/o abscess, and antibiotics wont penetrate the abscess)             Critical care was time spent personally by me on the following activities: development of treatment plan with patient or surrogate and bedside caregivers, discussions with consultants, evaluation of patient's response to treatment, examination of patient, ordering and performing treatments and interventions, ordering and review of laboratory studies, ordering and review of radiographic studies, pulse oximetry, re-evaluation of patient's condition. This critical care time did not  overlap with that of any other provider or involve time for any procedures.    Thank you for your consult. I will sign off. Please contact us if you have any additional questions.     Patient is stable for the floor.  His mentation is at baseline, he is urinating (200cc when I was in there) no longer hypotensive.      Jeanne Saavedra MD  Critical Care Medicine  [facility]

## 2019-01-14 NOTE — PLAN OF CARE
No insurance per face sheet. Nixon called CM with insurance info ([employer] [id number]) which CM sent to admitting request.     01/14/19 1319   Discharge Assessment   Assessment Type Discharge Planning Assessment   Confirmed/corrected address and phone number on facesheet? Yes   Assessment information obtained from? Patient   Expected Length of Stay (days) 3   Communicated expected length of stay with patient/caregiver yes   Prior to hospitilization cognitive status: Alert/Oriented   Prior to hospitalization functional status: Independent   Current cognitive status: Alert/Oriented   Current Functional Status: Independent   Lives With parent(s)  (mother lives with patient)   Able to Return to Prior Arrangements yes   Is patient able to care for self after discharge? Yes   Who are your caregiver(s) and their phone number(s)? nixon [facility]red[facility] assists   Patient's perception of discharge disposition home or selfcare   Readmission Within the Last 30 Days no previous admission in last 30 days   Patient currently being followed by outpatient case management? No   Patient currently receives any other outside agency services? No   Equipment Currently Used at Home none   Do you have any problems affording any of your prescribed medications? No   Is the patient taking medications as prescribed? yes   Does the patient have transportation home? Yes   Transportation Anticipated family or friend will provide   Does the patient receive services at the [facility]? No   Discharge Plan A Home with family   Discharge Plan B Home Health   DME Needed Upon Discharge  none   Patient/Family in Agreement with Plan yes

## 2019-01-14 NOTE — ED NOTES
Patient currently up on bed pan, no other complaints at the time.  Remains on continuous cardiac, blood pressure, and pulse ox monitoring on 2L of O2 via nasal canula.  Call light in reach, will continue to monitor.

## 2019-01-14 NOTE — ASSESSMENT & PLAN NOTE
- Patient with severe sepsis not on home oxygen but requiring 3L nasal cannula in ED. CXR showing no sign of intrapulmonary pathology. Well's score is 0. Patient not complaining of chest pain.    - Supplemental oxygen as needed for O2 sat L> 88%  - will wean to room air as tolerated

## 2019-01-14 NOTE — ASSESSMENT & PLAN NOTE
Patient with recently diagnosed UTI, started on outpatient antibiotics the day prior to admission. He has been endorsing significant diarrhea for the past 4 days as well as decreased PO intake. Patient afebrile at initial presentation on 1/12/19, now found to be febrile, tachycardic, and hypotensive. Lactate elevated at 3.4. Sepsis protocol initiated, given rocephin and IVF resuscitation, eventually broadened out to vancomycin and zosyn. Patient hypoxic, requiring 3L nasal cannula. Patient with perineal tenderness, no obvious sign of Vicky's or cellulitis. No testicular swelling or prostate tenderness.     Unclear source. Patient with UTI, initial culture still pending however repeat UA in ED at admission improved from previous (17 WBC and rare bacteria). CXR negative for consolidation. Patient with reproducible perineal pain however no overlying cellulitis or crepitus. Patient has been having concurrent diarrhea for the past several days as well. Potassium, phosphorus, and magnesium all low on presentation to ED. Patient's hypotension possibly 2/2 dehydration in combination with urosepsis.     SIRS Criteria: Fever (100.6), WBC (13.9), Tachycardia (152), Tachypnea (30)    Antibiotics: Vancomycin 15 mg/kg q24h, zosyn 4.5g q8h    Plan:  - CT abdomen pelvis and Scrotal ultrasound ordered  - Continue antibiotics with aim to deescalate pending sensitivities of urine cultures  - Blood cultures pending  - C.diff pending  - Patient continuing to have diarrhea, will continue maintenance fluids NS@125/hr. Encouraging PO intake  - Replete electrolytes as needed

## 2019-01-14 NOTE — NURSING TRANSFER
Nursing Transfer Note      1/14/2019     [facility]    Transfer via stretcher    Transfer with cardiac monitoring, O2 (venti mask)    Transported by ED tech    Medicines sent: no    Chart send with patient: yes    Patient reassessed at: 1020    Upon arrival to floor: cardiac monitoring maintained, VSS. Oriented pt to room, call light, verbalized understanding. Denies CP, SOB at this time. Will continue to monitor.

## 2019-01-14 NOTE — ASSESSMENT & PLAN NOTE
Patient diagnosed with UTI in ED on 1/12/19    - Urinalysis notable for WBCs and bacteria. Prescribed Cipro, patient took two doses, and reported improvement in perineal pain with medication  - Now presenting with sepsis, likely combined with dehydration 2/2 concomitant diarrheal illness  - Prostate nontender, firm on exam  - Now on broad spectrum antibiotics, cultures pending.     Continuing broad spectrum antibiotics with aim to deescalate pending sensitivities

## 2019-01-14 NOTE — CARE UPDATE
While rounding on patient at roughly 5am patient was observed wearing a nonrebreather. Was not notified of increasing O2 requirements. Patient wheezing, in respiratory distress. Wheezing on exam, patient with history of asthma and smoking history. Chest xray showed increased pulmonary markings. Patient satting in 80's on nonrebreather so started on BiPAP, 12/5 @ 50%FiO2.     Presumed volume overload due to fluid resuscitation. ABG notable for pH 7.27, pCO2: 42.1, PO2: 117 HCO3 19.3 on BiPAP. Given 40mg IV lasix and labs drawn. Patient diuresed total of roughly 1.6L of urine. Patient given 125mg Solumedrol and 3x duo nebs. Wheezing improved as patient diuresed. Attempted to place haynes because patient using urinal was causing increased respiratory distress initially. Patient uncomfortable during haynes placement, no history of BPH, patient began urinating around haynes prior to urine flushing through haynes and it was pulled. Blood noted at urethral meatus, patient passed small clot. Continuing to urinate normally with pink tinged urine, with patient endorsing mild pain with urination.    2nd dose of zosyn held initially due to patient being volume overloaded. Informed nurse ok to give Abx now. BNP returned 730. 2D Echo ordered.     Morgan Moeller MD  Internal medicine PGY1

## 2019-01-14 NOTE — ASSESSMENT & PLAN NOTE
--likely secondary to IV fluid boluses given for hypotension  --wean O2 to keep sats >88%  --continue lasix for volume removal  --follow up with 2D echo for possible heart failure

## 2019-01-14 NOTE — ED NOTES
Patient weak and hypotensive. Nurse began ordered LR bolus on a pressure bag and adjusted patient's position to improve current pressure.  Dr. Odonnell notified, advised to update after re-assessing pressure once this current bolus of fluid has completed.  Nurse will continue to monitor.

## 2019-01-14 NOTE — SUBJECTIVE & OBJECTIVE
History reviewed. No pertinent past medical history.    Past Surgical History:   Procedure Laterality Date    SKIN GRAFT         Review of patient's allergies indicates:  No Known Allergies    Family History     None        Tobacco Use    Smoking status: Current Every Day Smoker     Packs/day: 0.50    Smokeless tobacco: Never Used   Substance and Sexual Activity    Alcohol use: Yes     Alcohol/week: 6.0 oz     Types: 10 Cans of beer per week    Drug use: No    Sexual activity: Not on file      Review of Systems   Constitutional: Positive for fatigue and fever. Negative for chills.   Respiratory: Positive for shortness of breath. Negative for cough.    Gastrointestinal: Positive for diarrhea. Negative for abdominal pain, constipation and nausea.   Endocrine: Negative for polydipsia, polyphagia and polyuria.   Genitourinary: Negative for dysuria, flank pain, hematuria and urgency.   Musculoskeletal: Negative for arthralgias, neck pain and neck stiffness.   Skin: Negative for rash and wound.   Neurological: Positive for weakness. Negative for seizures and headaches.   Psychiatric/Behavioral: Negative for agitation and confusion.     Objective:     Vital Signs (Most Recent):  Temp: 99.2 °F (37.3 °C) (01/13/19 1844)  Pulse: 95 (01/13/19 2046)  Resp: (!) 28 (01/13/19 2046)  BP: (!) 94/55 (01/13/19 2046)  SpO2: 100 % (01/13/19 2016) Vital Signs (24h Range):  Temp:  [98 °F (36.7 °C)-100.6 °F (38.1 °C)] 99.2 °F (37.3 °C)  Pulse:  [] 95  Resp:  [14-41] 28  SpO2:  [85 %-100 %] 100 %  BP: ()/(46-57) 94/55   Weight: 86.2 kg (190 lb)  Body mass index is 28.06 kg/m².      Intake/Output Summary (Last 24 hours) at 1/13/2019 2120  Last data filed at 1/13/2019 1745  Gross per 24 hour   Intake 1050 ml   Output --   Net 1050 ml       Physical Exam   Constitutional: He is oriented to person, place, and time. He appears well-developed and well-nourished. No distress.   On 3L NC; normotensive   HENT:   Head: Normocephalic  and atraumatic.   Eyes: EOM are normal. Pupils are equal, round, and reactive to light.   Cardiovascular: Normal rate, regular rhythm, normal heart sounds and intact distal pulses.   No murmur heard.  Pulmonary/Chest: Breath sounds normal. No respiratory distress. He has no wheezes.   tachypnea    Abdominal: Soft. Bowel sounds are normal. He exhibits distension. There is no tenderness.   Genitourinary: Penile tenderness present.   Genitourinary Comments: No CVA tenderness   Musculoskeletal: Normal range of motion. He exhibits no edema, tenderness or deformity.   Neurological: He is alert and oriented to person, place, and time. No cranial nerve deficit. Coordination normal.   mentation appropriate   Skin: Skin is warm and dry. Capillary refill takes less than 2 seconds. No rash noted. He is not diaphoretic. No erythema.   Psychiatric: He has a normal mood and affect. His behavior is normal. Thought content normal.   Vitals reviewed.      Vents:     Lines/Drains/Airways     Peripheral Intravenous Line                 Peripheral IV - Single Lumen 01/13/19 1626 Right Upper Arm less than 1 day         Peripheral IV - Single Lumen 01/13/19 1630 Left Forearm less than 1 day              Significant Labs:    CBC/Anemia Profile:  Recent Labs   Lab 01/13/19  1627   WBC 13.98*   HGB 13.5*   HCT 38.7*      *   RDW 12.8        Chemistries:  Recent Labs   Lab 01/13/19  1627      K 2.9*      CO2 18*   BUN 18   CREATININE 1.7*   CALCIUM 8.8   ALBUMIN 3.0*   PROT 7.4   BILITOT 1.6*   ALKPHOS 202*   ALT 21   AST 26   MG 1.6   PHOS 1.1*       Blood Culture: No results for input(s): LABBLOO in the last 48 hours.  CMP:   Recent Labs   Lab 01/13/19  1627      K 2.9*      CO2 18*   *   BUN 18   CREATININE 1.7*   CALCIUM 8.8   PROT 7.4   ALBUMIN 3.0*   BILITOT 1.6*   ALKPHOS 202*   AST 26   ALT 21   ANIONGAP 16   EGFRNONAA 41.1*     Cardiac Markers: No results for input(s): CKMB, TROPONINT,  MYOGLOBIN in the last 48 hours.  Urine Culture: No results for input(s): LABURIN in the last 48 hours.    Significant Imaging: I have reviewed all pertinent imaging results/findings within the past 24 hours.

## 2019-01-15 LAB
ALBUMIN SERPL BCP-MCNC: 2.5 G/DL
ALLENS TEST: ABNORMAL
ALP SERPL-CCNC: 112 U/L
ALT SERPL W/O P-5'-P-CCNC: 17 U/L
ANION GAP SERPL CALC-SCNC: 10 MMOL/L
ANISOCYTOSIS BLD QL SMEAR: SLIGHT
AST SERPL-CCNC: 16 U/L
BACTERIA UR CULT: NO GROWTH
BASOPHILS # BLD AUTO: 0.02 K/UL
BASOPHILS NFR BLD: 0.1 %
BILIRUB SERPL-MCNC: 0.7 MG/DL
BUN SERPL-MCNC: 17 MG/DL
CA-I BLDV-SCNC: 1.08 MMOL/L
CALCIUM SERPL-MCNC: 8.7 MG/DL
CHLORIDE SERPL-SCNC: 106 MMOL/L
CO2 SERPL-SCNC: 22 MMOL/L
CREAT SERPL-MCNC: 1 MG/DL
DELSYS: ABNORMAL
DIFFERENTIAL METHOD: ABNORMAL
EOSINOPHIL # BLD AUTO: 0 K/UL
EOSINOPHIL NFR BLD: 0 %
ERYTHROCYTE [DISTWIDTH] IN BLOOD BY AUTOMATED COUNT: 12.6 %
EST. GFR  (AFRICAN AMERICAN): >60 ML/MIN/1.73 M^2
EST. GFR  (NON AFRICAN AMERICAN): >60 ML/MIN/1.73 M^2
GLUCOSE SERPL-MCNC: 188 MG/DL
HCO3 UR-SCNC: 20.7 MMOL/L (ref 24–28)
HCT VFR BLD AUTO: 36.7 %
HGB BLD-MCNC: 12.5 G/DL
HYPOCHROMIA BLD QL SMEAR: ABNORMAL
IMM GRANULOCYTES # BLD AUTO: 0.05 K/UL
IMM GRANULOCYTES NFR BLD AUTO: 0.4 %
LYMPHOCYTES # BLD AUTO: 0.8 K/UL
LYMPHOCYTES NFR BLD: 5.5 %
MAGNESIUM SERPL-MCNC: 2.4 MG/DL
MCH RBC QN AUTO: 33.8 PG
MCHC RBC AUTO-ENTMCNC: 34.1 G/DL
MCV RBC AUTO: 99 FL
MONOCYTES # BLD AUTO: 0.4 K/UL
MONOCYTES NFR BLD: 3.2 %
NEUTROPHILS # BLD AUTO: 12.3 K/UL
NEUTROPHILS NFR BLD: 90.8 %
NRBC BLD-RTO: 0 /100 WBC
OVALOCYTES BLD QL SMEAR: ABNORMAL
PCO2 BLDA: 33.2 MMHG (ref 35–45)
PH SMN: 7.4 [PH] (ref 7.35–7.45)
PHOSPHATE SERPL-MCNC: 2.5 MG/DL
PLATELET # BLD AUTO: 168 K/UL
PMV BLD AUTO: 10.5 FL
PO2 BLDA: 74 MMHG (ref 80–100)
POC BE: -4 MMOL/L
POC SATURATED O2: 95 % (ref 95–100)
POC TCO2: 22 MMOL/L (ref 23–27)
POCT GLUCOSE: 145 MG/DL (ref 70–110)
POCT GLUCOSE: 173 MG/DL (ref 70–110)
POCT GLUCOSE: 194 MG/DL (ref 70–110)
POIKILOCYTOSIS BLD QL SMEAR: SLIGHT
POLYCHROMASIA BLD QL SMEAR: ABNORMAL
POTASSIUM SERPL-SCNC: 3.4 MMOL/L
PROT SERPL-MCNC: 7 G/DL
RBC # BLD AUTO: 3.7 M/UL
SAMPLE: ABNORMAL
SITE: ABNORMAL
SODIUM SERPL-SCNC: 138 MMOL/L
VIT B1 BLD-MCNC: 43 UG/L (ref 38–122)
WBC # BLD AUTO: 13.59 K/UL

## 2019-01-15 PROCEDURE — 63600175 PHARM REV CODE 636 W HCPCS: Performed by: INTERNAL MEDICINE

## 2019-01-15 PROCEDURE — 99232 PR SUBSEQUENT HOSPITAL CARE,LEVL II: ICD-10-PCS | Mod: GC,,, | Performed by: HOSPITALIST

## 2019-01-15 PROCEDURE — 80053 COMPREHEN METABOLIC PANEL: CPT

## 2019-01-15 PROCEDURE — 84100 ASSAY OF PHOSPHORUS: CPT

## 2019-01-15 PROCEDURE — 85025 COMPLETE CBC W/AUTO DIFF WBC: CPT

## 2019-01-15 PROCEDURE — 93010 ELECTROCARDIOGRAM REPORT: CPT | Mod: 76,,, | Performed by: INTERNAL MEDICINE

## 2019-01-15 PROCEDURE — 83735 ASSAY OF MAGNESIUM: CPT

## 2019-01-15 PROCEDURE — 36415 COLL VENOUS BLD VENIPUNCTURE: CPT

## 2019-01-15 PROCEDURE — 20600001 HC STEP DOWN PRIVATE ROOM

## 2019-01-15 PROCEDURE — 25000003 PHARM REV CODE 250: Performed by: INTERNAL MEDICINE

## 2019-01-15 PROCEDURE — 25500020 PHARM REV CODE 255: Performed by: HOSPITALIST

## 2019-01-15 PROCEDURE — 93010 ELECTROCARDIOGRAM REPORT: CPT | Mod: ,,, | Performed by: INTERNAL MEDICINE

## 2019-01-15 PROCEDURE — 93005 ELECTROCARDIOGRAM TRACING: CPT

## 2019-01-15 PROCEDURE — 25000003 PHARM REV CODE 250: Performed by: STUDENT IN AN ORGANIZED HEALTH CARE EDUCATION/TRAINING PROGRAM

## 2019-01-15 PROCEDURE — S4991 NICOTINE PATCH NONLEGEND: HCPCS | Performed by: STUDENT IN AN ORGANIZED HEALTH CARE EDUCATION/TRAINING PROGRAM

## 2019-01-15 PROCEDURE — 82330 ASSAY OF CALCIUM: CPT

## 2019-01-15 PROCEDURE — 63600175 PHARM REV CODE 636 W HCPCS: Performed by: STUDENT IN AN ORGANIZED HEALTH CARE EDUCATION/TRAINING PROGRAM

## 2019-01-15 PROCEDURE — 93010 EKG 12-LEAD: ICD-10-PCS | Mod: 76,,, | Performed by: INTERNAL MEDICINE

## 2019-01-15 PROCEDURE — 87491 CHLMYD TRACH DNA AMP PROBE: CPT

## 2019-01-15 PROCEDURE — 87040 BLOOD CULTURE FOR BACTERIA: CPT | Mod: 59

## 2019-01-15 PROCEDURE — 99232 SBSQ HOSP IP/OBS MODERATE 35: CPT | Mod: GC,,, | Performed by: HOSPITALIST

## 2019-01-15 RX ORDER — INSULIN ASPART 100 [IU]/ML
0-5 INJECTION, SOLUTION INTRAVENOUS; SUBCUTANEOUS
Status: DISCONTINUED | OUTPATIENT
Start: 2019-01-15 | End: 2019-01-20 | Stop reason: HOSPADM

## 2019-01-15 RX ORDER — POTASSIUM CHLORIDE 750 MG/1
30 CAPSULE, EXTENDED RELEASE ORAL ONCE
Status: COMPLETED | OUTPATIENT
Start: 2019-01-15 | End: 2019-01-15

## 2019-01-15 RX ORDER — IBUPROFEN 200 MG
24 TABLET ORAL
Status: DISCONTINUED | OUTPATIENT
Start: 2019-01-15 | End: 2019-01-20 | Stop reason: HOSPADM

## 2019-01-15 RX ORDER — IBUPROFEN 200 MG
16 TABLET ORAL
Status: DISCONTINUED | OUTPATIENT
Start: 2019-01-15 | End: 2019-01-20 | Stop reason: HOSPADM

## 2019-01-15 RX ORDER — ENOXAPARIN SODIUM 100 MG/ML
40 INJECTION SUBCUTANEOUS EVERY 24 HOURS
Status: DISCONTINUED | OUTPATIENT
Start: 2019-01-15 | End: 2019-01-20 | Stop reason: HOSPADM

## 2019-01-15 RX ORDER — SODIUM,POTASSIUM PHOSPHATES 280-250MG
2 POWDER IN PACKET (EA) ORAL 2 TIMES DAILY
Status: DISCONTINUED | OUTPATIENT
Start: 2019-01-15 | End: 2019-01-17 | Stop reason: ALTCHOICE

## 2019-01-15 RX ORDER — GLUCAGON 1 MG
1 KIT INJECTION
Status: DISCONTINUED | OUTPATIENT
Start: 2019-01-15 | End: 2019-01-20 | Stop reason: HOSPADM

## 2019-01-15 RX ADMIN — IOHEXOL 100 ML: 350 INJECTION, SOLUTION INTRAVENOUS at 02:01

## 2019-01-15 RX ADMIN — FUROSEMIDE 40 MG: 10 INJECTION, SOLUTION INTRAVENOUS at 08:01

## 2019-01-15 RX ADMIN — FUROSEMIDE 40 MG: 10 INJECTION, SOLUTION INTRAVENOUS at 05:01

## 2019-01-15 RX ADMIN — POTASSIUM & SODIUM PHOSPHATES POWDER PACK 280-160-250 MG 2 PACKET: 280-160-250 PACK at 09:01

## 2019-01-15 RX ADMIN — PIPERACILLIN AND TAZOBACTAM 4.5 G: 4; .5 INJECTION, POWDER, LYOPHILIZED, FOR SOLUTION INTRAVENOUS; PARENTERAL at 04:01

## 2019-01-15 RX ADMIN — VANCOMYCIN HYDROCHLORIDE 1250 MG: 10 INJECTION, POWDER, LYOPHILIZED, FOR SOLUTION INTRAVENOUS at 01:01

## 2019-01-15 RX ADMIN — PIPERACILLIN AND TAZOBACTAM 4.5 G: 4; .5 INJECTION, POWDER, LYOPHILIZED, FOR SOLUTION INTRAVENOUS; PARENTERAL at 05:01

## 2019-01-15 RX ADMIN — POTASSIUM CHLORIDE 30 MEQ: 750 CAPSULE, EXTENDED RELEASE ORAL at 08:01

## 2019-01-15 RX ADMIN — VANCOMYCIN HYDROCHLORIDE 1250 MG: 10 INJECTION, POWDER, LYOPHILIZED, FOR SOLUTION INTRAVENOUS at 03:01

## 2019-01-15 RX ADMIN — PIPERACILLIN AND TAZOBACTAM 4.5 G: 4; .5 INJECTION, POWDER, LYOPHILIZED, FOR SOLUTION INTRAVENOUS; PARENTERAL at 09:01

## 2019-01-15 RX ADMIN — NICOTINE 1 PATCH: 21 PATCH, EXTENDED RELEASE TRANSDERMAL at 08:01

## 2019-01-15 RX ADMIN — METHYLPREDNISOLONE SODIUM SUCCINATE 125 MG: 125 INJECTION, POWDER, FOR SOLUTION INTRAMUSCULAR; INTRAVENOUS at 05:01

## 2019-01-15 RX ADMIN — ENOXAPARIN SODIUM 40 MG: 100 INJECTION SUBCUTANEOUS at 05:01

## 2019-01-15 RX ADMIN — METHYLPREDNISOLONE SODIUM SUCCINATE 125 MG: 125 INJECTION, POWDER, FOR SOLUTION INTRAMUSCULAR; INTRAVENOUS at 01:01

## 2019-01-15 RX ADMIN — POTASSIUM & SODIUM PHOSPHATES POWDER PACK 280-160-250 MG 2 PACKET: 280-160-250 PACK at 08:01

## 2019-01-15 NOTE — ASSESSMENT & PLAN NOTE
"Patient diagnosed with UTI in ED on 1/12/19    - Urinalysis notable for WBCs and bacteria. Prescribed Cipro, patient took two doses, and reported improvement in perineal pain with medication  - Now presenting with sepsis, likely combined with dehydration 2/2 concomitant diarrheal illness  - Prostate nontender, firm on exam  - Now on broad spectrum antibiotics, cultures pending.     Continuing broad spectrum antibiotics with aim to deescalate pending sensitivities -- see "severe sepsis"  "

## 2019-01-15 NOTE — PROGRESS NOTES
Telemetry showing rhythm changes. Notified MD Torres. Ordered EKG. No further orders were given. Will continue to monitor.

## 2019-01-15 NOTE — PROGRESS NOTES
01/15/19 0500   Critical Value Communication   Notified Physician/Designee MD Torres   Date Result Received 01/15/19   Time Result Received 0525   Resulting Department of Critical Value Lab   Who communicated critical value from resulting department? Gladys Cortés   Critical Test #1 Blood culture- anaerobic   Critical Test #1 Result Gram positive cocci clusters resembling staph; Gram positive rods, Gram negative rods   Date Notified 01/15/19   Time Notified 0530   Read Back Verification Yes   Physician Directive none     Notified MD Torres. No further orders were given. Will continue to monitor.

## 2019-01-15 NOTE — NURSING
"Pt inquiring about fluid restriction -- orders for 1000mL/day fluid restriction currently in place. Notified MD Tony, stated ok to increase restriction to 2L, since pt has diuresed well since admit. Will implement. Will continue to monitor.    0418: Pt also reported that sometimes when swallowing, he feels like "my airway closes off, and I choke." States "it doesn't happen every time though." MD Tony notified, ordered bedside swallow eval per Nursing, and to follow up with results. Will implement. Will continue to monitor.  "

## 2019-01-15 NOTE — ASSESSMENT & PLAN NOTE
Patient diagnosed with hypertension within the past year. Patient states he is on an antihypertensive at home but is unclear of the name. No documentation in chart as to what patient is taking. Patient states he has not taken the medication for several days and often forgets dose.    - given hypotension requiring IVF resuscitation, will continue holding any antihypertensives

## 2019-01-15 NOTE — SUBJECTIVE & OBJECTIVE
Interval History:   Patient improved significantly with addition of lasix, oxygen requirements reduced to 4lpm NC.  Plan for abdo CT w/ contrast to look for possible abscess.  Daily blood cultures, growing gram pos cocci and gram neg rods.    Review of Systems   Constitutional: Negative for diaphoresis and fatigue.   HENT: Negative for sore throat and trouble swallowing.    Respiratory: Positive for shortness of breath. Negative for cough, chest tightness and wheezing.    Cardiovascular: Positive for leg swelling. Negative for chest pain and palpitations.   Gastrointestinal: Negative for nausea and vomiting.   Genitourinary: Negative for dysuria and hematuria.   Neurological: Positive for weakness. Negative for headaches.     Objective:     Vital Signs (Most Recent):  Temp: 97.6 °F (36.4 °C) (01/14/19 1700)  Pulse: 76 (01/14/19 1930)  Resp: 18 (01/14/19 1700)  BP: (!) 151/75 (01/14/19 1700)  SpO2: (!) 92 % (01/14/19 1930) Vital Signs (24h Range):  Temp:  [97.6 °F (36.4 °C)-99.1 °F (37.3 °C)] 97.6 °F (36.4 °C)  Pulse:  [] 76  Resp:  [18-65] 18  SpO2:  [91 %-96 %] 92 %  BP: (110-157)/(53-75) 151/75     Weight: 84.4 kg (186 lb 1.1 oz)  Body mass index is 27.48 kg/m².    Intake/Output Summary (Last 24 hours) at 1/14/2019 2128  Last data filed at 1/14/2019 1500  Gross per 24 hour   Intake 2150 ml   Output 2700 ml   Net -550 ml      Physical Exam   Constitutional: He is oriented to person, place, and time. Vital signs are normal. He appears well-developed. He is cooperative. No distress. Face mask in place.   HENT:   Head: Normocephalic and atraumatic.   Eyes: Conjunctivae are normal. Pupils are equal, round, and reactive to light. Right eye exhibits no exudate. Left eye exhibits no exudate. Right conjunctiva has no hemorrhage. Left conjunctiva has no hemorrhage. Right eye exhibits no nystagmus. Left eye exhibits no nystagmus.   Neck: Trachea normal. No neck rigidity. No tracheal deviation present.   Cardiovascular:  Normal rate and regular rhythm. Exam reveals no gallop.   No murmur heard.  Pulmonary/Chest: Effort normal. No accessory muscle usage. Tachypnea noted. No respiratory distress. He has wheezes. He has no rhonchi. He has rales.   Oxygen requirements significantly reduced to 4lpm NC   Abdominal: Soft. Normal appearance and bowel sounds are normal. There is no tenderness.   Musculoskeletal: Normal range of motion.   Neurological: He is alert and oriented to person, place, and time. No cranial nerve deficit. GCS eye subscore is 4. GCS verbal subscore is 5. GCS motor subscore is 6.   Skin: Skin is warm and dry. Capillary refill takes 2 to 3 seconds. He is not diaphoretic. No cyanosis. Nails show no clubbing.   Psychiatric: He has a normal mood and affect. His speech is normal and behavior is normal.   Nursing note and vitals reviewed.      Significant Labs:   ABGs:   Recent Labs   Lab 01/15/19  0609   PH 7.404   PCO2 33.2*   HCO3 20.7*   POCSATURATED 95   BE -4     CBC:   Recent Labs   Lab 01/13/19  2141 01/14/19  0613 01/15/19  0338   WBC  --  14.00* 13.59*   HGB  --  14.8 12.5*   HCT 34* 43.3 36.7*   PLT  --  183 168     CMP:   Recent Labs   Lab 01/14/19  0613 01/15/19  0338    138   K 3.4* 3.4*   * 106   CO2 18* 22*   * 188*   BUN 14 17   CREATININE 1.2 1.0   CALCIUM 9.0 8.7   PROT 8.1 7.0   ALBUMIN 3.1* 2.5*   BILITOT 1.5* 0.7   ALKPHOS 192* 112   AST 43* 16   ALT 28 17   ANIONGAP 13 10   EGFRNONAA >60.0 >60.0     Magnesium:   Recent Labs   Lab 01/14/19  1452 01/15/19  0338   MG 2.2 2.4     Urine Culture:   Recent Labs   Lab 01/13/19 1938   LABURIN No growth     Urine Studies:   Recent Labs   Lab 01/13/19 1938   COLORU Yellow   APPEARANCEUA Hazy*   PHUR 5.0   SPECGRAV 1.010   PROTEINUA Negative   GLUCUA Negative   KETONESU Negative   BILIRUBINUA Negative   OCCULTUA 1+*   NITRITE Negative   LEUKOCYTESUR 1+*   RBCUA 7*   WBCUA 17*   BACTERIA Rare   SQUAMEPITHEL 4       Significant Imaging:        1/15 CT ABDO-pelvis w/ contrast  1. No acute intra-abdominal abnormality identified.  2. Cholelithiasis.  3. Additional findings as above.    1/15 US Scrotum and testicles  No acute testicular pathology.  Right hydrocele and left varicocele present.  Small scrotal alejandro.  Small left epididymal head cysts.    1/14 CXR  The heart is not enlarged ill-defined patchy airspace disease and/or edema noted at the lung bases.  Upper lung fields are clear.  No pleural effusion.    1/14 CT abdo non-con  1. No acute intra-abdominal/pelvic CT abnormalities to account for the reported history of sepsis.  2. Right basilar subsegmental atelectasis or consolidation with trace associated dependent pleural fluid.  3. Cholelithiasis.  4. Subcentimeter hypodensity within the right hepatic dome, too small to accurately characterize.

## 2019-01-15 NOTE — ASSESSMENT & PLAN NOTE
Patient lives at home with another family member, able to complete ADL's without difficulty    - PT/OT consulted  - Social work consulted

## 2019-01-15 NOTE — HOSPITAL COURSE
Brief HPI  Patient came to the ED 1/12 with urinary symptoms and fatigue. He was treated for prostatitis with Cipro (has taken 2 doses). This morning he awoke feeling more fatigued, short of breath, and dizzy described as near syncopal. Endorses fever, chills, and rigors.  Denies URI symptoms, chest pain, vomiting, abdominal pain, gross hematuria, melena or hematochezia. No prior abdominal surgeries. No known sick contacts or recent travel. Has mild low back pain across his back. His brother brings him to the ED for further evaluation. In the ED today (1/13) patient had hypotension, tachypnea and hypokalemia.  He additionally had an elevated troponin. ICU was initially consulted for sepsis and hypotension.        Mr. Friedman given IV fluid boluses to treat hypotension and shortly after developed some respiratory distress requiring application of BiPAP and a subsequent second critical care consult. Simultaneously he was given lasix. Approximately an hour later, he has appropriately urinated in response to the lasix and no longer requires BIPAP. His oxygenation was weaned down to facemask and he denies shortness of breath. Because he is returning to baseline, admission to MICU no longer necessary.     Patient improved significantly with addition of lasix, oxygen requirements reduced to 4lpm NC.  Plan for abdo CT w/ contrast to look for possible abscess.  Daily blood cultures, growing gram pos cocci and gram neg rods.

## 2019-01-15 NOTE — ASSESSMENT & PLAN NOTE
- Patient with severe sepsis not on home oxygen but requiring 3L nasal cannula in ED. CXR showing no sign of intrapulmonary pathology. Well's score is 0. Patient not complaining of chest pain.    - Patient developed severe pulmonary edema after initiation of sepsis ordered fluids (~3L)  - Improved significantly with diuresis, lasix 40mg BID IV -- plan for 1 more day of diuresis  - Supplemental oxygen as needed for O2 sat L> 88%  - will wean to room air as tolerated

## 2019-01-15 NOTE — PROGRESS NOTES
Awaiting EKG. -130. Patient asymptomatic. Notified MD Torres. No further orders were given. Will continue to monitor.

## 2019-01-15 NOTE — ASSESSMENT & PLAN NOTE
Patient with recently diagnosed UTI, started on outpatient antibiotics the day prior to admission. He has been endorsing significant diarrhea for the past 4 days as well as decreased PO intake. Patient afebrile at initial presentation on 1/12/19, now found to be febrile, tachycardic, and hypotensive. Lactate elevated at 3.4. Sepsis protocol initiated, given rocephin and IVF resuscitation, eventually broadened out to vancomycin and zosyn. Patient hypoxic, requiring 3L nasal cannula. Patient with perineal tenderness, no obvious sign of Vicky's or cellulitis. No testicular swelling or prostate tenderness.     Unclear source. Patient with UTI, initial culture still pending however repeat UA in ED at admission improved from previous (17 WBC and rare bacteria). CXR negative for consolidation. Patient with reproducible perineal pain however no overlying cellulitis or crepitus. Patient has been having concurrent diarrhea for the past several days as well. Potassium, phosphorus, and magnesium all low on presentation to ED. Patient's hypotension possibly 2/2 dehydration in combination with urosepsis.     SIRS Criteria: Fever (100.6), WBC (13.9), Tachycardia (152), Tachypnea (30)    Antibiotics: Vancomycin 15 mg/kg q24h, zosyn 4.5g q8h    Plan:  - CT abdomen pelvis and Scrotal ultrasound did not show any clear source of infection  - Continue antibiotics vanc and zosyn for now, will require extended abx course as blood cultures positive  - Daily blood cultures until negative  - C.diff negative  - Replete electrolytes as needed

## 2019-01-15 NOTE — PLAN OF CARE
Problem: Adult Inpatient Plan of Care  Goal: Plan of Care Review  Outcome: Ongoing (interventions implemented as appropriate)  Pt free of falls, injury this shift. POC reviewed with pt at bedside. Weaning O2 as tolerated, weaned to 2LNC, spO2 95-96%%. IV abx continued. Anaerobic blood cultures (+) for gram (+) cocci in clusters resembling MD hans notified. Diuresing with 40mg Lasix IVP BID, tolerating well. Solumedrol IVP D/C'd for concerns of leukocytosis. VSS, NAD noted. Will continue to monitor.

## 2019-01-16 PROBLEM — R13.10 DYSPHAGIA: Status: ACTIVE | Noted: 2019-01-16

## 2019-01-16 LAB
ALBUMIN SERPL BCP-MCNC: 2.7 G/DL
ALP SERPL-CCNC: 119 U/L
ALT SERPL W/O P-5'-P-CCNC: 15 U/L
ANION GAP SERPL CALC-SCNC: 11 MMOL/L
AST SERPL-CCNC: 13 U/L
BASOPHILS # BLD AUTO: 0.02 K/UL
BASOPHILS NFR BLD: 0.1 %
BILIRUB SERPL-MCNC: 0.6 MG/DL
BNP SERPL-MCNC: 123 PG/ML
BUN SERPL-MCNC: 28 MG/DL
C TRACH DNA SPEC QL NAA+PROBE: NOT DETECTED
CALCIUM SERPL-MCNC: 8.8 MG/DL
CHLORIDE SERPL-SCNC: 108 MMOL/L
CO2 SERPL-SCNC: 24 MMOL/L
CREAT SERPL-MCNC: 1.2 MG/DL
DIFFERENTIAL METHOD: ABNORMAL
EOSINOPHIL # BLD AUTO: 0 K/UL
EOSINOPHIL NFR BLD: 0 %
ERYTHROCYTE [DISTWIDTH] IN BLOOD BY AUTOMATED COUNT: 12.6 %
EST. GFR  (AFRICAN AMERICAN): >60 ML/MIN/1.73 M^2
EST. GFR  (NON AFRICAN AMERICAN): >60 ML/MIN/1.73 M^2
GLUCOSE SERPL-MCNC: 146 MG/DL
HCT VFR BLD AUTO: 38.8 %
HGB BLD-MCNC: 13 G/DL
IMM GRANULOCYTES # BLD AUTO: 0.1 K/UL
IMM GRANULOCYTES NFR BLD AUTO: 0.6 %
LYMPHOCYTES # BLD AUTO: 1.1 K/UL
LYMPHOCYTES NFR BLD: 6.7 %
MCH RBC QN AUTO: 33.4 PG
MCHC RBC AUTO-ENTMCNC: 33.5 G/DL
MCV RBC AUTO: 100 FL
MONOCYTES # BLD AUTO: 0.9 K/UL
MONOCYTES NFR BLD: 5.3 %
N GONORRHOEA DNA SPEC QL NAA+PROBE: NOT DETECTED
NEUTROPHILS # BLD AUTO: 14.5 K/UL
NEUTROPHILS NFR BLD: 87.3 %
NRBC BLD-RTO: 0 /100 WBC
PHOSPHATE SERPL-MCNC: 3.5 MG/DL
PLATELET # BLD AUTO: 177 K/UL
PMV BLD AUTO: 10.6 FL
POCT GLUCOSE: 105 MG/DL (ref 70–110)
POCT GLUCOSE: 122 MG/DL (ref 70–110)
POCT GLUCOSE: 159 MG/DL (ref 70–110)
POCT GLUCOSE: 164 MG/DL (ref 70–110)
POTASSIUM SERPL-SCNC: 3.3 MMOL/L
PROT SERPL-MCNC: 7.1 G/DL
RBC # BLD AUTO: 3.89 M/UL
SODIUM SERPL-SCNC: 143 MMOL/L
VANCOMYCIN TROUGH SERPL-MCNC: 16.8 UG/ML
WBC # BLD AUTO: 16.63 K/UL
WBC #/AREA STL HPF: NORMAL /[HPF]

## 2019-01-16 PROCEDURE — 92610 EVALUATE SWALLOWING FUNCTION: CPT

## 2019-01-16 PROCEDURE — 80202 ASSAY OF VANCOMYCIN: CPT

## 2019-01-16 PROCEDURE — 99223 1ST HOSP IP/OBS HIGH 75: CPT | Mod: ,,, | Performed by: INTERNAL MEDICINE

## 2019-01-16 PROCEDURE — 87045 FECES CULTURE AEROBIC BACT: CPT

## 2019-01-16 PROCEDURE — 89055 LEUKOCYTE ASSESSMENT FECAL: CPT

## 2019-01-16 PROCEDURE — 97165 OT EVAL LOW COMPLEX 30 MIN: CPT

## 2019-01-16 PROCEDURE — 25000003 PHARM REV CODE 250: Performed by: STUDENT IN AN ORGANIZED HEALTH CARE EDUCATION/TRAINING PROGRAM

## 2019-01-16 PROCEDURE — 85025 COMPLETE CBC W/AUTO DIFF WBC: CPT

## 2019-01-16 PROCEDURE — 99223 PR INITIAL HOSPITAL CARE,LEVL III: ICD-10-PCS | Mod: ,,, | Performed by: INTERNAL MEDICINE

## 2019-01-16 PROCEDURE — 99232 SBSQ HOSP IP/OBS MODERATE 35: CPT | Mod: GC,,, | Performed by: HOSPITALIST

## 2019-01-16 PROCEDURE — 99232 PR SUBSEQUENT HOSPITAL CARE,LEVL II: ICD-10-PCS | Mod: GC,,, | Performed by: HOSPITALIST

## 2019-01-16 PROCEDURE — 83880 ASSAY OF NATRIURETIC PEPTIDE: CPT

## 2019-01-16 PROCEDURE — 87425 ROTAVIRUS AG IA: CPT

## 2019-01-16 PROCEDURE — 63600175 PHARM REV CODE 636 W HCPCS: Performed by: STUDENT IN AN ORGANIZED HEALTH CARE EDUCATION/TRAINING PROGRAM

## 2019-01-16 PROCEDURE — 87040 BLOOD CULTURE FOR BACTERIA: CPT

## 2019-01-16 PROCEDURE — 87209 SMEAR COMPLEX STAIN: CPT

## 2019-01-16 PROCEDURE — 36415 COLL VENOUS BLD VENIPUNCTURE: CPT

## 2019-01-16 PROCEDURE — 11000001 HC ACUTE MED/SURG PRIVATE ROOM

## 2019-01-16 PROCEDURE — 80053 COMPREHEN METABOLIC PANEL: CPT

## 2019-01-16 PROCEDURE — 82272 OCCULT BLD FECES 1-3 TESTS: CPT

## 2019-01-16 PROCEDURE — S4991 NICOTINE PATCH NONLEGEND: HCPCS | Performed by: STUDENT IN AN ORGANIZED HEALTH CARE EDUCATION/TRAINING PROGRAM

## 2019-01-16 PROCEDURE — 87046 STOOL CULTR AEROBIC BACT EA: CPT | Mod: 59

## 2019-01-16 PROCEDURE — 84100 ASSAY OF PHOSPHORUS: CPT

## 2019-01-16 PROCEDURE — 25000003 PHARM REV CODE 250: Performed by: INTERNAL MEDICINE

## 2019-01-16 PROCEDURE — 63600175 PHARM REV CODE 636 W HCPCS: Performed by: INTERNAL MEDICINE

## 2019-01-16 PROCEDURE — 87329 GIARDIA AG IA: CPT

## 2019-01-16 PROCEDURE — 87338 HPYLORI STOOL AG IA: CPT

## 2019-01-16 RX ORDER — POTASSIUM CHLORIDE 750 MG/1
30 CAPSULE, EXTENDED RELEASE ORAL ONCE
Status: COMPLETED | OUTPATIENT
Start: 2019-01-16 | End: 2019-01-16

## 2019-01-16 RX ORDER — LISINOPRIL AND HYDROCHLOROTHIAZIDE 10; 12.5 MG/1; MG/1
1 TABLET ORAL DAILY
COMMUNITY
End: 2023-07-12 | Stop reason: CLARIF

## 2019-01-16 RX ORDER — POTASSIUM CHLORIDE 20 MEQ/1
40 TABLET, EXTENDED RELEASE ORAL ONCE
Status: COMPLETED | OUTPATIENT
Start: 2019-01-16 | End: 2019-01-16

## 2019-01-16 RX ADMIN — PIPERACILLIN AND TAZOBACTAM 4.5 G: 4; .5 INJECTION, POWDER, LYOPHILIZED, FOR SOLUTION INTRAVENOUS; PARENTERAL at 02:01

## 2019-01-16 RX ADMIN — POTASSIUM & SODIUM PHOSPHATES POWDER PACK 280-160-250 MG 2 PACKET: 280-160-250 PACK at 09:01

## 2019-01-16 RX ADMIN — POTASSIUM CHLORIDE 30 MEQ: 750 CAPSULE, EXTENDED RELEASE ORAL at 02:01

## 2019-01-16 RX ADMIN — PIPERACILLIN AND TAZOBACTAM 4.5 G: 4; .5 INJECTION, POWDER, LYOPHILIZED, FOR SOLUTION INTRAVENOUS; PARENTERAL at 06:01

## 2019-01-16 RX ADMIN — NICOTINE 1 PATCH: 21 PATCH, EXTENDED RELEASE TRANSDERMAL at 09:01

## 2019-01-16 RX ADMIN — VANCOMYCIN HYDROCHLORIDE 1250 MG: 10 INJECTION, POWDER, LYOPHILIZED, FOR SOLUTION INTRAVENOUS at 12:01

## 2019-01-16 RX ADMIN — VANCOMYCIN HYDROCHLORIDE 1250 MG: 10 INJECTION, POWDER, LYOPHILIZED, FOR SOLUTION INTRAVENOUS at 02:01

## 2019-01-16 RX ADMIN — FUROSEMIDE 40 MG: 10 INJECTION, SOLUTION INTRAVENOUS at 09:01

## 2019-01-16 RX ADMIN — POTASSIUM CHLORIDE 40 MEQ: 1500 TABLET, EXTENDED RELEASE ORAL at 09:01

## 2019-01-16 RX ADMIN — PIPERACILLIN AND TAZOBACTAM 4.5 G: 4; .5 INJECTION, POWDER, LYOPHILIZED, FOR SOLUTION INTRAVENOUS; PARENTERAL at 09:01

## 2019-01-16 RX ADMIN — ENOXAPARIN SODIUM 40 MG: 100 INJECTION SUBCUTANEOUS at 04:01

## 2019-01-16 NOTE — PROGRESS NOTES
"[facility] Hospital Medicine  Progress Note    Patient Name: Brian Friedman  MRN: [id number]  Patient Class: IP- Inpatient   Admission Date: 1/13/2019  Length of Stay: 3 days  Attending Physician: Omega Lea MD  Primary Care Provider: Primary Doctor [facility] Medicine Team: [facility] MED 3 Chuck Gramajo MD    Subjective:     Principal Problem:Sepsis    HPI:  Mr. Friedman is a 68 year old male with a history of HTN who presented to [facility] ED for evaluation of dizziness. The patient had been seen the previous day (1/12/19) in the ED complaining of dysuria and "brown" colored urine. He endorsed testicular pain that had been present for roughly 1 week. At that time urinalysis showed WBC's and occasional bacteria, cultures are still pending. He was started on cipro for presumptive prostatitis and discharged home. The patient had two doses of cipro total at home before representing to the ED. A this time he was endorsing dizziness, fatigue, and near syncope. He endorsed fevers, chills, rigors, and diarrhea at home. The patient states that he had been having diarrhea for roughly 4 days or so prior to presenting. Patient describes watery brown stools roughly every 2 hours. He denies nausea, vomiting, or abdominal pain. The patient has had poor PO intake over those few days, eating and drinking very little because he did not want to have more diarrhea. He denies sick contacts. At this time the patient denies dysuria, hematuria, pyuria, penial discharge, scrotal pain or swelling. He states he notices a pain in his perineal area when he sits and he does endorse mild low back pain across his lower back. Patient endorsing cough for past week productive of thick white sputum as well. The patient is sexually active with one long term partner and does not use condoms. He denies history of STI. The patient states he drinks 1 pint of alcohol most days for the past 10-20 years. His last drink was 4 days " ago.    In the ED the patient was hypotensive, tachyonic, and tachycardic. Troponin was elevated and lactate was 3.4. The patient denied chest pain. He was given rocephin and 3L fluid resuscitation. Critical care was consulted for possible septic shock. The patient's initial blood pressure was 90/50 with a pulse rate of 152 and he was hypoxic (satting in the 80's). Patient's vitals improved with fluid resuscitation and supplemental oxygen. Lactate trended down to 1.6. Antibiotics were broadened to vancomycin and zosyn and C.diff was sent. Patient determined stable for floor and patient was admitted hospital medicine for further treatment of severe sepsis.      Hospital Course:  Brief HPI  Patient came to the ED 1/12 with urinary symptoms and fatigue. He was treated for prostatitis with Cipro (has taken 2 doses). This morning he awoke feeling more fatigued, short of breath, and dizzy described as near syncopal. Endorses fever, chills, and rigors.  Denies URI symptoms, chest pain, vomiting, abdominal pain, gross hematuria, melena or hematochezia. No prior abdominal surgeries. No known sick contacts or recent travel. Has mild low back pain across his back. His brother brings him to the ED for further evaluation. In the ED today (1/13) patient had hypotension, tachypnea and hypokalemia.  He additionally had an elevated troponin. ICU was initially consulted for sepsis and hypotension.        Mr. Friedman given IV fluid boluses to treat hypotension and shortly after developed some respiratory distress requiring application of BiPAP and a subsequent second critical care consult. Simultaneously he was given lasix. Approximately an hour later, he has appropriately urinated in response to the lasix and no longer requires BIPAP. His oxygenation was weaned down to facemask and he denies shortness of breath. Because he is returning to baseline, admission to MICU no longer necessary.     Patient improved significantly with  addition of lasix, oxygen requirements reduced to 4lpm NC.  Plan for abdo CT w/ contrast to look for possible abscess.  Daily blood cultures, growing gram pos cocci and gram neg rods.        Interval History:   Patient improved significantly with addition of lasix (discontinued), currently on RA sating at 94%.  No abscess has identified on CT A/P.  Daily blood cultures, growing gram pos cocci and gram neg rods. Patient is complaining of dysphagia, SLP consulted and MBSS was ordered.    Review of Systems   Constitutional: Negative for diaphoresis and fatigue.   HENT: Negative for sore throat and trouble swallowing.    Respiratory: Negative for cough, chest tightness, shortness of breath and wheezing.    Cardiovascular: Negative for chest pain, palpitations and leg swelling.   Gastrointestinal: Negative for nausea and vomiting.   Genitourinary: Negative for dysuria and hematuria.   Neurological: Positive for weakness. Negative for headaches.     Objective:     Vital Signs (Most Recent):  Temp: 97.4 °F (36.3 °C) (01/16/19 0811)  Pulse: 89 (01/16/19 1136)  Resp: 18 (01/16/19 1125)  BP: (!) 108/59 (01/16/19 1125)  SpO2: 98 % (01/16/19 1125) Vital Signs (24h Range):  Temp:  [97.4 °F (36.3 °C)-98.2 °F (36.8 °C)] 97.4 °F (36.3 °C)  Pulse:  [] 89  Resp:  [18] 18  SpO2:  [91 %-98 %] 98 %  BP: (108-144)/(59-73) 108/59     Weight: 84.4 kg (186 lb 1.1 oz)  Body mass index is 27.48 kg/m².    Intake/Output Summary (Last 24 hours) at 1/16/2019 1232  Last data filed at 1/16/2019 1000  Gross per 24 hour   Intake 480 ml   Output 1300 ml   Net -820 ml      Physical Exam   Constitutional: He is oriented to person, place, and time. Vital signs are normal. He appears well-developed. He is cooperative.   HENT:   Head: Normocephalic and atraumatic.   Eyes: Conjunctivae are normal. Pupils are equal, round, and reactive to light. Right eye exhibits no exudate. Left eye exhibits no exudate. Right conjunctiva has no hemorrhage. Left  conjunctiva has no hemorrhage. Right eye exhibits no nystagmus. Left eye exhibits no nystagmus.   Neck: Trachea normal. No neck rigidity. No tracheal deviation present.   Cardiovascular: Normal rate and regular rhythm. Exam reveals no gallop.   No murmur heard.  Pulmonary/Chest: Effort normal. No accessory muscle usage. No tachypnea. No respiratory distress. He has no wheezes. He has no rhonchi. He has no rales.   Abdominal: Soft. Normal appearance and bowel sounds are normal. There is no tenderness.   Musculoskeletal: Normal range of motion.   Neurological: He is alert and oriented to person, place, and time. No cranial nerve deficit. GCS eye subscore is 4. GCS verbal subscore is 5. GCS motor subscore is 6.   Skin: Skin is warm and dry. Capillary refill takes 2 to 3 seconds. He is not diaphoretic. No cyanosis. Nails show no clubbing.   Psychiatric: He has a normal mood and affect. His speech is normal and behavior is normal.   Nursing note and vitals reviewed.      Significant Labs:   ABGs:   Recent Labs   Lab 01/15/19  0609   PH 7.404   PCO2 33.2*   HCO3 20.7*   POCSATURATED 95   BE -4     CBC:   Recent Labs   Lab 01/15/19  0338 01/16/19  0416   WBC 13.59* 16.63*   HGB 12.5* 13.0*   HCT 36.7* 38.8*    177     CMP:   Recent Labs   Lab 01/15/19  0338 01/16/19  0416    143   K 3.4* 3.3*    108   CO2 22* 24   * 146*   BUN 17 28*   CREATININE 1.0 1.2   CALCIUM 8.7 8.8   PROT 7.0 7.1   ALBUMIN 2.5* 2.7*   BILITOT 0.7 0.6   ALKPHOS 112 119   AST 16 13   ALT 17 15   ANIONGAP 10 11   EGFRNONAA >60.0 >60.0     Magnesium:   Recent Labs   Lab 01/14/19  1452 01/15/19  0338   MG 2.2 2.4     Urine Culture:   No results for input(s): LABURIN in the last 48 hours.  Urine Studies:   No results for input(s): COLORU, APPEARANCEUA, PHUR, SPECGRAV, PROTEINUA, GLUCUA, KETONESU, BILIRUBINUA, OCCULTUA, NITRITE, UROBILINOGEN, LEUKOCYTESUR, RBCUA, WBCUA, BACTERIA, SQUAMEPITHEL, HYALINECASTS in the last 48  hours.    Invalid input(s): WRIGHTSUR    Significant Imagin/15 CT ABDO-pelvis w/ contrast  1. No acute intra-abdominal abnormality identified.  2. Cholelithiasis.  3. Additional findings as above.    1/15 US Scrotum and testicles  No acute testicular pathology.  Right hydrocele and left varicocele present.  Small scrotal alejandro.  Small left epididymal head cysts.     CXR  The heart is not enlarged ill-defined patchy airspace disease and/or edema noted at the lung bases.  Upper lung fields are clear.  No pleural effusion.     CT abdo non-con  1. No acute intra-abdominal/pelvic CT abnormalities to account for the reported history of sepsis.  2. Right basilar subsegmental atelectasis or consolidation with trace associated dependent pleural fluid.  3. Cholelithiasis.  4. Subcentimeter hypodensity within the right hepatic dome, too small to accurately characterize.    Assessment/Plan:      * Sepsis    Patient with recently diagnosed UTI, started on outpatient antibiotics the day prior to admission. He has been endorsing significant diarrhea for the past 4 days as well as decreased PO intake. On admission found to be febrile, tachycardic, and hypotensive. Lactate elevated at 3.4. Sepsis protocol initiated, given rocephin and IVF resuscitation, eventually broadened out to vancomycin and zosyn. Patient was hypoxic, requiring 3L nasal cannula. Patient with perineal tenderness, no obvious sign of Vicky's or cellulitis. No testicular swelling or prostate tenderness.     Unclear source. CXR negative for consolidation. Patient with reproducible perineal pain however no overlying cellulitis or crepitus. Patient has been having concurrent diarrhea for the past several days as well. Potassium, phosphorus, and magnesium all low on presentation to ED. Blood culture revealed gram positive cocci and gram negative rods.    Plan:  - ID consulted for staph bacteremia, appreciate their recs.  - CT abdomen pelvis and  "Scrotal ultrasound did not show any clear source of infection  - Continue antibiotics vanc and zosyn for now, will require extended abx course as blood cultures positive  - C.diff negative  - Replete electrolytes as needed     JUAN (acute kidney injury)    Patient presents with Cr 1.7. Baseline appears to be around 1.1 per chart review. Has been endorsing poor PO intake in combination with severe diarrhea over past several days.    - Likely prerenal, given IVF's in ED and Cr level has improved.  - Will follow daily Cr level.       Acute respiratory failure with hypoxia    - Patient with sepsis not on home oxygen but requiring 3L nasal cannula in ED. CXR showing no sign of intrapulmonary pathology. Well's score is 0. Patient not complaining of chest pain.    - Patient developed severe pulmonary edema after initiation of sepsis ordered fluids (~3L)  - Improved significantly with diuresis, lasix 40mg BID IV (discontinued)  - currently on RA sating at 94%       Dysphagia    - Patient is complaining of difficulty swallowing to both solids and liquids.  - SLP was consulted for evaluation.  - MBSS was ordered.       Hypotension    - improving significantly  - see "sepsis" above       Discharge planning issues    Patient lives at home with another family member, able to complete ADL's without difficulty    - PT/OT consulted  - Social work consulted       Acute cystitis without hematuria    Patient diagnosed with UTI in ED on 1/12/19    - Urinalysis notable for WBCs and bacteria. Prescribed Cipro, patient took two doses, and reported improvement in perineal pain with medication  - Now presenting with sepsis, likely combined with dehydration 2/2 concomitant diarrheal illness, urine culture is negative  - Prostate nontender, firm on exam  - Now on broad spectrum antibiotics  - see "sepsis"     Essential hypertension    Patient diagnosed with hypertension within the past year. Patient states he is on an antihypertensive at home " but is unclear of the name. No documentation in chart as to what patient is taking. Patient states he has not taken the medication for several days and often forgets dose.    - given hypotension requiring IVF resuscitation at time of admission, will continue holding any antihypertensives         VTE Risk Mitigation (From admission, onward)        Ordered     enoxaparin injection 40 mg  Daily      01/15/19 1155     Place sequential compression device  Until discontinued      01/13/19 2327     IP VTE LOW RISK PATIENT  Once      01/13/19 8695              Chuck Gramajo MD  Department of Hospital Medicine   [facility]

## 2019-01-16 NOTE — PT/OT/SLP EVAL
"Speech Language Pathology Evaluation  Bedside Swallow    Patient Name:  Brian Friedman   MRN:  [id number]  Admitting Diagnosis: Sepsis    Recommendations:                 General Recommendations:  GI evaluation and Modified barium swallow study  Diet recommendations:  Regular, Thin   Aspiration Precautions: Standard aspiration precautions   General Precautions: Standard,    Communication strategies:  none    History:     Past Medical History:   Diagnosis Date    Essential hypertension 1/14/2019       Past Surgical History:   Procedure Laterality Date    SKIN GRAFT       Prior diet: regular / thin    Subjective     "I'm telling you I've been choking and gasping for air when I drink water for months now"  "Food gets caught right here" Patient pointed to lower pharyngeal cavity.  Pt awake/alert - seated upright in bedside chair.     Pain/Comfort:  · Pain Rating 1: 0/10    Objective:     Oral Musculature Evaluation  · Oral Musculature: WFL  · Dentition: present and adequate  · Mucosal Quality: good  · Mandibular Strength and Mobility: WFL  · Oral Labial Strength and Mobility: WFL  · Lingual Strength and Mobility: WFL  · Velar Elevation: WFL  · Buccal Strength and Mobility: WFL  · Volitional Cough: Present  · Volitional Swallow: Present  · Voice Prior to PO Intake: clear    Bedside Swallow Eval:   Consistencies Assessed:  · Thin liquids via cup and straw  · Solids via 1 whole saltine cracker.      Oral Phase:   · WFL    Pharyngeal Phase:   · throat clearing x1 noted post liquid via straw.     Compensatory Strategies  · None    Treatment: Patient repeatedly stating choking with thin liquids, though not evident during evaluation. ST discussed with MD vega possibility of MBSS to objectively assess swallow function/safety. Skilled education was provided to patient and family members re: diet recs, standard aspiration precautions of which to follow, and ongoing ST plan of care. Patient verbalized understanding.   Whiteboard " updated.     Assessment:     Brian Friedman is a 68 y.o. male with an SLP diagnosis of reported dysphagia.  Pt to participate in MBSS next service date.     Goals:   Multidisciplinary Problems     SLP Goals        Problem: SLP Goal    Goal Priority Disciplines Outcome   SLP Goal     SLP    Description:  Speech Language Pathology Goals  Goals expected to be met by 1/23  1. Pt will tolerate a regular solid diet and thin liquids with no overt signs of airway compromise.  2. Pt will participate in a MBSS to objectively assess swallow function/safety.                                  Plan:     · Patient to be seen:  2 x/week   · Plan of Care expires:  02/16/19  · Plan of Care reviewed with:  patient   · SLP Follow-Up:  Yes       Discharge recommendations:  other (see comments)   Barriers to Discharge:  None    Time Tracking:     SLP Treatment Date:   01/16/19  Speech Start Time:  1106  Speech Stop Time:  1114     Speech Total Time (min):  8 min    Billable Minutes: Eval Swallow and Oral Function 8    Emily Abadie, CCC-SLP  01/16/2019

## 2019-01-16 NOTE — PLAN OF CARE
Problem: Adult Inpatient Plan of Care  Goal: Plan of Care Review  Outcome: Ongoing (interventions implemented as appropriate)  Pt A&O x 4.  Afebrile. VSS on RA. SpO2 remains greater than 92%, no reported SOB.  Pt denies pain.  IV vancomycin and zosyn given.  Intake and output recorded.  Stool sample obtained.  Blood glucose monitoring maintained.

## 2019-01-16 NOTE — ASSESSMENT & PLAN NOTE
Patient presents with Cr 1.7. Baseline appears to be around 1.1 per chart review. Has been endorsing poor PO intake in combination with severe diarrhea over past several days.    - Likely prerenal, given IVF's in ED and Cr level has improved.  - Will follow daily Cr level.

## 2019-01-16 NOTE — PLAN OF CARE
Problem: SLP Goal  Goal: SLP Goal  Speech Language Pathology Goals  Goals expected to be met by 1/23  1. Pt will tolerate a regular solid diet and thin liquids with no overt signs of airway compromise.  2. Pt will participate in a MBSS to objectively assess swallow function/safety.                Bedside swallow evaluation completed with implemented plan of care.   Emily P. Abadie M.S., CCC-SLP  Speech Language Pathologist  [phone]  01/16/2019

## 2019-01-16 NOTE — PROGRESS NOTES
"[facility] Hospital Medicine  Progress Note    Patient Name: Brian Friedman  MRN: [id number]  Patient Class: IP- Inpatient   Admission Date: 1/13/2019  Length of Stay: 2 days  Attending Physician: Omega Lea MD  Primary Care Provider: Primary Doctor [facility] Medicine Team: [facility] MED 3 Nicanor Jimenez MD    Subjective:     Principal Problem:Severe sepsis    HPI:  Mr. Friedman is a 68 year old male with a history of HTN who presented to [facility] ED for evaluation of dizziness. The patient had been seen the previous day (1/12/19) in the ED complaining of dysuria and "brown" colored urine. He endorsed testicular pain that had been present for roughly 1 week. At that time urinalysis showed WBC's and occasional bacteria, cultures are still pending. He was started on cipro for presumptive prostatitis and discharged home. The patient had two doses of cipro total at home before representing to the ED. A this time he was endorsing dizziness, fatigue, and near syncope. He endorsed fevers, chills, rigors, and diarrhea at home. The patient states that he had been having diarrhea for roughly 4 days or so prior to presenting. Patient describes watery brown stools roughly every 2 hours. He denies nausea, vomiting, or abdominal pain. The patient has had poor PO intake over those few days, eating and drinking very little because he did not want to have more diarrhea. He denies sick contacts. At this time the patient denies dysuria, hematuria, pyuria, penial discharge, scrotal pain or swelling. He states he notices a pain in his perineal area when he sits and he does endorse mild low back pain across his lower back. Patient endorsing cough for past week productive of thick white sputum as well. The patient is sexually active with one long term partner and does not use condoms. He denies history of STI. The patient states he drinks 1 pint of alcohol most days for the past 10-20 years. His last drink was 4 days " ago.    In the ED the patient was hypotensive, tachyonic, and tachycardic. Troponin was elevated and lactate was 3.4. The patient denied chest pain. He was given rocephin and 3L fluid resuscitation. Critical care was consulted for possible septic shock. The patient's initial blood pressure was 90/50 with a pulse rate of 152 and he was hypoxic (satting in the 80's). Patient's vitals improved with fluid resuscitation and supplemental oxygen. Lactate trended down to 1.6. Antibiotics were broadened to vancomycin and zosyn and C.diff was sent. Patient determined stable for floor and patient was admitted hospital medicine for further treatment of severe sepsis.      Hospital Course:  Brief HPI  Patient came to the ED 1/12 with urinary symptoms and fatigue. He was treated for prostatitis with Cipro (has taken 2 doses). This morning he awoke feeling more fatigued, short of breath, and dizzy described as near syncopal. Endorses fever, chills, and rigors.  Denies URI symptoms, chest pain, vomiting, abdominal pain, gross hematuria, melena or hematochezia. No prior abdominal surgeries. No known sick contacts or recent travel. Has mild low back pain across his back. His brother brings him to the ED for further evaluation. In the ED today (1/13) patient had hypotension, tachypnea and hypokalemia.  He additionally had an elevated troponin. ICU was initially consulted for sepsis and hypotension.        Mr. Friedman given IV fluid boluses to treat hypotension and shortly after developed some respiratory distress requiring application of BiPAP and a subsequent second critical care consult. Simultaneously he was given lasix. Approximately an hour later, he has appropriately urinated in response to the lasix and no longer requires BIPAP. His oxygenation was weaned down to facemask and he denies shortness of breath. Because he is returning to baseline, admission to MICU no longer necessary.     Patient improved significantly with  addition of lasix, oxygen requirements reduced to 4lpm NC.  Plan for abdo CT w/ contrast to look for possible abscess.  Daily blood cultures, growing gram pos cocci and gram neg rods.        Interval History:   Patient improved significantly with addition of lasix, oxygen requirements reduced to 4lpm NC.  Plan for abdo CT w/ contrast to look for possible abscess.  Daily blood cultures, growing gram pos cocci and gram neg rods.    Review of Systems   Constitutional: Negative for diaphoresis and fatigue.   HENT: Negative for sore throat and trouble swallowing.    Respiratory: Positive for shortness of breath. Negative for cough, chest tightness and wheezing.    Cardiovascular: Positive for leg swelling. Negative for chest pain and palpitations.   Gastrointestinal: Negative for nausea and vomiting.   Genitourinary: Negative for dysuria and hematuria.   Neurological: Positive for weakness. Negative for headaches.     Objective:     Vital Signs (Most Recent):  Temp: 97.6 °F (36.4 °C) (01/14/19 1700)  Pulse: 76 (01/14/19 1930)  Resp: 18 (01/14/19 1700)  BP: (!) 151/75 (01/14/19 1700)  SpO2: (!) 92 % (01/14/19 1930) Vital Signs (24h Range):  Temp:  [97.6 °F (36.4 °C)-99.1 °F (37.3 °C)] 97.6 °F (36.4 °C)  Pulse:  [] 76  Resp:  [18-65] 18  SpO2:  [91 %-96 %] 92 %  BP: (110-157)/(53-75) 151/75     Weight: 84.4 kg (186 lb 1.1 oz)  Body mass index is 27.48 kg/m².    Intake/Output Summary (Last 24 hours) at 1/14/2019 2128  Last data filed at 1/14/2019 1500  Gross per 24 hour   Intake 2150 ml   Output 2700 ml   Net -550 ml      Physical Exam   Constitutional: He is oriented to person, place, and time. Vital signs are normal. He appears well-developed. He is cooperative. No distress. Face mask in place.   HENT:   Head: Normocephalic and atraumatic.   Eyes: Conjunctivae are normal. Pupils are equal, round, and reactive to light. Right eye exhibits no exudate. Left eye exhibits no exudate. Right conjunctiva has no hemorrhage.  Left conjunctiva has no hemorrhage. Right eye exhibits no nystagmus. Left eye exhibits no nystagmus.   Neck: Trachea normal. No neck rigidity. No tracheal deviation present.   Cardiovascular: Normal rate and regular rhythm. Exam reveals no gallop.   No murmur heard.  Pulmonary/Chest: Effort normal. No accessory muscle usage. Tachypnea noted. No respiratory distress. He has wheezes. He has no rhonchi. He has rales.   Oxygen requirements significantly reduced to 4lpm NC   Abdominal: Soft. Normal appearance and bowel sounds are normal. There is no tenderness.   Musculoskeletal: Normal range of motion.   Neurological: He is alert and oriented to person, place, and time. No cranial nerve deficit. GCS eye subscore is 4. GCS verbal subscore is 5. GCS motor subscore is 6.   Skin: Skin is warm and dry. Capillary refill takes 2 to 3 seconds. He is not diaphoretic. No cyanosis. Nails show no clubbing.   Psychiatric: He has a normal mood and affect. His speech is normal and behavior is normal.   Nursing note and vitals reviewed.      Significant Labs:   ABGs:   Recent Labs   Lab 01/15/19  0609   PH 7.404   PCO2 33.2*   HCO3 20.7*   POCSATURATED 95   BE -4     CBC:   Recent Labs   Lab 01/13/19  2141 01/14/19  0613 01/15/19  0338   WBC  --  14.00* 13.59*   HGB  --  14.8 12.5*   HCT 34* 43.3 36.7*   PLT  --  183 168     CMP:   Recent Labs   Lab 01/14/19  0613 01/15/19  0338    138   K 3.4* 3.4*   * 106   CO2 18* 22*   * 188*   BUN 14 17   CREATININE 1.2 1.0   CALCIUM 9.0 8.7   PROT 8.1 7.0   ALBUMIN 3.1* 2.5*   BILITOT 1.5* 0.7   ALKPHOS 192* 112   AST 43* 16   ALT 28 17   ANIONGAP 13 10   EGFRNONAA >60.0 >60.0     Magnesium:   Recent Labs   Lab 01/14/19  1452 01/15/19  0338   MG 2.2 2.4     Urine Culture:   Recent Labs   Lab 01/13/19 1938   LABURIN No growth     Urine Studies:   Recent Labs   Lab 01/13/19 1938   COLORU Yellow   APPEARANCEUA Hazy*   PHUR 5.0   SPECGRAV 1.010   PROTEINUA Negative   GLUCUA  Negative   KETONESU Negative   BILIRUBINUA Negative   OCCULTUA 1+*   NITRITE Negative   LEUKOCYTESUR 1+*   RBCUA 7*   WBCUA 17*   BACTERIA Rare   SQUAMEPITHEL 4       Significant Imagin/15 CT ABDO-pelvis w/ contrast  1. No acute intra-abdominal abnormality identified.  2. Cholelithiasis.  3. Additional findings as above.    1/15 US Scrotum and testicles  No acute testicular pathology.  Right hydrocele and left varicocele present.  Small scrotal alejandro.  Small left epididymal head cysts.     CXR  The heart is not enlarged ill-defined patchy airspace disease and/or edema noted at the lung bases.  Upper lung fields are clear.  No pleural effusion.     CT abdo non-con  1. No acute intra-abdominal/pelvic CT abnormalities to account for the reported history of sepsis.  2. Right basilar subsegmental atelectasis or consolidation with trace associated dependent pleural fluid.  3. Cholelithiasis.  4. Subcentimeter hypodensity within the right hepatic dome, too small to accurately characterize.    Assessment/Plan:      * Severe sepsis    Patient with recently diagnosed UTI, started on outpatient antibiotics the day prior to admission. He has been endorsing significant diarrhea for the past 4 days as well as decreased PO intake. Patient afebrile at initial presentation on 19, now found to be febrile, tachycardic, and hypotensive. Lactate elevated at 3.4. Sepsis protocol initiated, given rocephin and IVF resuscitation, eventually broadened out to vancomycin and zosyn. Patient hypoxic, requiring 3L nasal cannula. Patient with perineal tenderness, no obvious sign of Vicky's or cellulitis. No testicular swelling or prostate tenderness.     Unclear source. Patient with UTI, initial culture still pending however repeat UA in ED at admission improved from previous (17 WBC and rare bacteria). CXR negative for consolidation. Patient with reproducible perineal pain however no overlying cellulitis or crepitus.  "Patient has been having concurrent diarrhea for the past several days as well. Potassium, phosphorus, and magnesium all low on presentation to ED. Patient's hypotension possibly 2/2 dehydration in combination with urosepsis.     SIRS Criteria: Fever (100.6), WBC (13.9), Tachycardia (152), Tachypnea (30)    Antibiotics: Vancomycin 15 mg/kg q24h, zosyn 4.5g q8h    Plan:  - CT abdomen pelvis and Scrotal ultrasound did not show any clear source of infection  - Continue antibiotics vanc and zosyn for now, will require extended abx course as blood cultures positive  - Daily blood cultures until negative  - C.diff negative  - Replete electrolytes as needed         Acute respiratory failure with hypoxia    - Patient with severe sepsis not on home oxygen but requiring 3L nasal cannula in ED. CXR showing no sign of intrapulmonary pathology. Well's score is 0. Patient not complaining of chest pain.    - Patient developed severe pulmonary edema after initiation of sepsis ordered fluids (~3L)  - Improved significantly with diuresis, lasix 40mg BID IV -- plan for 1 more day of diuresis  - Supplemental oxygen as needed for O2 sat L> 88%  - will wean to room air as tolerated       JUAN (acute kidney injury)    Patient presents with Cr 1.7. Baseline appears to be around 1.1 per chart review. Has been endorsing poor PO intake in combination with severe diarrhea over past several days.    - Likely prerenal, given IVF's in ED and getting continuous fluids on floor  - Will follow Cr with daily labs for improvement  - If not improvement will consider urine studies to further investigate       Hypotension    - improving significantly  - see "severe sepsis" above       Discharge planning issues    Patient lives at home with another family member, able to complete ADL's without difficulty    - PT/OT consulted  - Social work consulted       Acute cystitis without hematuria    Patient diagnosed with UTI in ED on 1/12/19    - Urinalysis notable " "for WBCs and bacteria. Prescribed Cipro, patient took two doses, and reported improvement in perineal pain with medication  - Now presenting with sepsis, likely combined with dehydration 2/2 concomitant diarrheal illness  - Prostate nontender, firm on exam  - Now on broad spectrum antibiotics, cultures pending.     Continuing broad spectrum antibiotics with aim to deescalate pending sensitivities -- see "severe sepsis"     Essential hypertension    Patient diagnosed with hypertension within the past year. Patient states he is on an antihypertensive at home but is unclear of the name. No documentation in chart as to what patient is taking. Patient states he has not taken the medication for several days and often forgets dose.    - given hypotension requiring IVF resuscitation, will continue holding any antihypertensives         VTE Risk Mitigation (From admission, onward)        Ordered     enoxaparin injection 40 mg  Daily      01/15/19 1155     Place sequential compression device  Until discontinued      01/13/19 2327     IP VTE LOW RISK PATIENT  Once      01/13/19 2325              Nicanor Jimenez MD  Department of Hospital Medicine   [facility]  "

## 2019-01-16 NOTE — PLAN OF CARE
Problem: Adult Inpatient Plan of Care  Goal: Plan of Care Review  Plan of care discussed with patient.  Patient ambulating independently, fall precautions in place. Patient has no complaints of pain. Discussed medications and care. IV abx continued. Patient has no questions at this time.White board updated. Bed locked in lowest position. Side rails up x2. Will continue to monitor.

## 2019-01-16 NOTE — PLAN OF CARE
Problem: Occupational Therapy Goal  Goal: Occupational Therapy Goal  Outcome: Outcome(s) achieved Date Met: 01/16/19  OT eval completed.  Independent with ADL's assessed.  Discharge OT on acute.

## 2019-01-16 NOTE — ASSESSMENT & PLAN NOTE
"Patient diagnosed with UTI in ED on 1/12/19    - Urinalysis notable for WBCs and bacteria. Prescribed Cipro, patient took two doses, and reported improvement in perineal pain with medication  - Now presenting with sepsis, likely combined with dehydration 2/2 concomitant diarrheal illness, urine culture is negative  - Prostate nontender, firm on exam  - Now on broad spectrum antibiotics  - see "sepsis"  "

## 2019-01-16 NOTE — ASSESSMENT & PLAN NOTE
- Patient is complaining of difficulty swallowing to both solids and liquids.  - SLP was consulted for evaluation.  - MBSS was ordered.

## 2019-01-16 NOTE — ASSESSMENT & PLAN NOTE
Patient with recently diagnosed UTI, started on outpatient antibiotics the day prior to admission. He has been endorsing significant diarrhea for the past 4 days as well as decreased PO intake. On admission found to be febrile, tachycardic, and hypotensive. Lactate elevated at 3.4. Sepsis protocol initiated, given rocephin and IVF resuscitation, eventually broadened out to vancomycin and zosyn. Patient was hypoxic, requiring 3L nasal cannula. Patient with perineal tenderness, no obvious sign of Vicky's or cellulitis. No testicular swelling or prostate tenderness.     Unclear source. CXR negative for consolidation. Patient with reproducible perineal pain however no overlying cellulitis or crepitus. Patient has been having concurrent diarrhea for the past several days as well. Potassium, phosphorus, and magnesium all low on presentation to ED. Blood culture revealed gram positive cocci and gram negative rods.    Plan:  - ID consulted for staph bacteremia, appreciate their recs.  - CT abdomen pelvis and Scrotal ultrasound did not show any clear source of infection  - Continue antibiotics vanc and zosyn for now, will require extended abx course as blood cultures positive  - C.diff negative  - Replete electrolytes as needed

## 2019-01-16 NOTE — ASSESSMENT & PLAN NOTE
68 year old male presents with fevers, chills, rigors, diarrhea, dizziness, and perineal pain and found to be septic in the ER as he was febrile, tachycardic, hypotensive, hypoxic, with lactic acid of 3.4 and leukocytosis; CT abdomen/pelvis negative; ultrasound of scrotum negative; no signs of cellulitis on exam. 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 Continue empiric vanc and zosyn for now; vanc trough therapeutic  2. Will f/u on GPC, GNR, and GPR on blood cultures  3. F/u on stool cultures as well as GC/chlamydia  4. Will follow

## 2019-01-16 NOTE — ASSESSMENT & PLAN NOTE
Patient diagnosed with hypertension within the past year. Patient states he is on an antihypertensive at home but is unclear of the name. No documentation in chart as to what patient is taking. Patient states he has not taken the medication for several days and often forgets dose.    - given hypotension requiring IVF resuscitation at time of admission, will continue holding any antihypertensives

## 2019-01-16 NOTE — ASSESSMENT & PLAN NOTE
- Patient with sepsis not on home oxygen but requiring 3L nasal cannula in ED. CXR showing no sign of intrapulmonary pathology. Well's score is 0. Patient not complaining of chest pain.    - Patient developed severe pulmonary edema after initiation of sepsis ordered fluids (~3L)  - Improved significantly with diuresis, lasix 40mg BID IV (discontinued)  - currently on RA sating at 94%

## 2019-01-16 NOTE — SUBJECTIVE & OBJECTIVE
Interval History:   Patient improved significantly with addition of lasix (discontinued), currently on RA sating at 94%.  No abscess has identified on CT A/P.  Daily blood cultures, growing gram pos cocci and gram neg rods. Patient is complaining of dysphagia, SLP consulted and MBSS was ordered.    Review of Systems   Constitutional: Negative for diaphoresis and fatigue.   HENT: Negative for sore throat and trouble swallowing.    Respiratory: Negative for cough, chest tightness, shortness of breath and wheezing.    Cardiovascular: Negative for chest pain, palpitations and leg swelling.   Gastrointestinal: Negative for nausea and vomiting.   Genitourinary: Negative for dysuria and hematuria.   Neurological: Positive for weakness. Negative for headaches.     Objective:     Vital Signs (Most Recent):  Temp: 97.4 °F (36.3 °C) (01/16/19 0811)  Pulse: 89 (01/16/19 1136)  Resp: 18 (01/16/19 1125)  BP: (!) 108/59 (01/16/19 1125)  SpO2: 98 % (01/16/19 1125) Vital Signs (24h Range):  Temp:  [97.4 °F (36.3 °C)-98.2 °F (36.8 °C)] 97.4 °F (36.3 °C)  Pulse:  [] 89  Resp:  [18] 18  SpO2:  [91 %-98 %] 98 %  BP: (108-144)/(59-73) 108/59     Weight: 84.4 kg (186 lb 1.1 oz)  Body mass index is 27.48 kg/m².    Intake/Output Summary (Last 24 hours) at 1/16/2019 1232  Last data filed at 1/16/2019 1000  Gross per 24 hour   Intake 480 ml   Output 1300 ml   Net -820 ml      Physical Exam   Constitutional: He is oriented to person, place, and time. Vital signs are normal. He appears well-developed. He is cooperative.   HENT:   Head: Normocephalic and atraumatic.   Eyes: Conjunctivae are normal. Pupils are equal, round, and reactive to light. Right eye exhibits no exudate. Left eye exhibits no exudate. Right conjunctiva has no hemorrhage. Left conjunctiva has no hemorrhage. Right eye exhibits no nystagmus. Left eye exhibits no nystagmus.   Neck: Trachea normal. No neck rigidity. No tracheal deviation present.   Cardiovascular: Normal  rate and regular rhythm. Exam reveals no gallop.   No murmur heard.  Pulmonary/Chest: Effort normal. No accessory muscle usage. No tachypnea. No respiratory distress. He has no wheezes. He has no rhonchi. He has no rales.   Abdominal: Soft. Normal appearance and bowel sounds are normal. There is no tenderness.   Musculoskeletal: Normal range of motion.   Neurological: He is alert and oriented to person, place, and time. No cranial nerve deficit. GCS eye subscore is 4. GCS verbal subscore is 5. GCS motor subscore is 6.   Skin: Skin is warm and dry. Capillary refill takes 2 to 3 seconds. He is not diaphoretic. No cyanosis. Nails show no clubbing.   Psychiatric: He has a normal mood and affect. His speech is normal and behavior is normal.   Nursing note and vitals reviewed.      Significant Labs:   ABGs:   Recent Labs   Lab 01/15/19  0609   PH 7.404   PCO2 33.2*   HCO3 20.7*   POCSATURATED 95   BE -4     CBC:   Recent Labs   Lab 01/15/19  0338 19  0416   WBC 13.59* 16.63*   HGB 12.5* 13.0*   HCT 36.7* 38.8*    177     CMP:   Recent Labs   Lab 01/15/19  0338 19  0416    143   K 3.4* 3.3*    108   CO2 22* 24   * 146*   BUN 17 28*   CREATININE 1.0 1.2   CALCIUM 8.7 8.8   PROT 7.0 7.1   ALBUMIN 2.5* 2.7*   BILITOT 0.7 0.6   ALKPHOS 112 119   AST 16 13   ALT 17 15   ANIONGAP 10 11   EGFRNONAA >60.0 >60.0     Magnesium:   Recent Labs   Lab 19  1452 01/15/19  0338   MG 2.2 2.4     Urine Culture:   No results for input(s): LABURIN in the last 48 hours.  Urine Studies:   No results for input(s): COLORU, APPEARANCEUA, PHUR, SPECGRAV, PROTEINUA, GLUCUA, KETONESU, BILIRUBINUA, OCCULTUA, NITRITE, UROBILINOGEN, LEUKOCYTESUR, RBCUA, WBCUA, BACTERIA, SQUAMEPITHEL, HYALINECASTS in the last 48 hours.    Invalid input(s): WRIGHTSUR    Significant Imagin/15 CT ABDO-pelvis w/ contrast  1. No acute intra-abdominal abnormality identified.  2. Cholelithiasis.  3. Additional findings as  above.    1/15 US Scrotum and testicles  No acute testicular pathology.  Right hydrocele and left varicocele present.  Small scrotal alejandro.  Small left epididymal head cysts.    1/14 CXR  The heart is not enlarged ill-defined patchy airspace disease and/or edema noted at the lung bases.  Upper lung fields are clear.  No pleural effusion.    1/14 CT abdo non-con  1. No acute intra-abdominal/pelvic CT abnormalities to account for the reported history of sepsis.  2. Right basilar subsegmental atelectasis or consolidation with trace associated dependent pleural fluid.  3. Cholelithiasis.  4. Subcentimeter hypodensity within the right hepatic dome, too small to accurately characterize.

## 2019-01-16 NOTE — CONSULTS
[facility]  Infectious Disease  Consult Note    Patient Name: Brian Friedman  MRN: [id number]  Admission Date: 1/13/2019  Hospital Length of Stay: 3 days  Attending Physician: Omega Lea MD  Primary Care Provider: Primary Doctor No     Isolation Status: No active isolations    Patient information was obtained from patient and past medical records.      Inpatient consult to Infectious Diseases  Consult performed by: RASHID Bowman  Consult ordered by: Willy Malloy MD        Assessment/Plan:     * Sepsis    68 year old male presents with fevers, chills, rigors, diarrhea, dizziness, and perineal pain and found to be septic in the ER as he was febrile, tachycardic, hypotensive, hypoxic, with lactic acid of 3.4 and leukocytosis; CT abdomen/pelvis negative; ultrasound of scrotum negative; no signs of cellulitis on exam. 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 Continue empiric vanc and zosyn for now; vanc trough therapeutic  2. Will f/u on GPC, GNR, and GPR on blood cultures  3. F/u on stool cultures as well as GC/chlamydia  4. Will follow         Thank you for your consult. I will follow-up with patient. Please contact us if you have any additional questions.    RASHID Morrison Pager: [phone]  Infectious Disease  [facility]    Subjective:     Principal Problem: Sepsis    HPI: Mr. Friedman is a 68 year old male with a history of HTN who [place] [facility] [place] of  "dizziness. The patient had been seen the previous day (1/12/19) in the ED complaining of dysuria and "brown" colored urine. He endorsed testicular pain that had been present for roughly 1 week. At that time urinalysis showed WBC's and occasional bacteria, cultures negative. He was started on cipro for presumptive prostatitis and discharged home. The patient had two doses of cipro total at home before representing to the ED. A this time he was endorsing dizziness, fatigue, and near syncope. He endorsed fevers, chills, rigors, and diarrhea at home. The patient states that he had been having diarrhea for roughly 4 days or so prior to presenting He denies nausea, vomiting, or abdominal pain. He denies sick contacts. At this time the patient denies dysuria, hematuria, pyuria, penial discharge, scrotal pain or swelling. He states he notices a pain in his perineal area when he sits and he does endorse mild low back pain across his lower back. Patient endorsing cough for past week productive of thick white sputum as well. The patient is sexually active with one long term partner and does not use condoms. He denies history of STI.      In the ED the patient was hypotensive, tachyonic, and tachycardic. Troponin was elevated and lactate was 3.4. The patient denied chest pain. He was given rocephin and 3L fluid resuscitation. Critical care was consulted for possible septic shock. The patient's initial blood pressure was 90/50 with a pulse rate of 152 and he was hypoxic (satting in the 80's). Patient's vitals improved with fluid resuscitation and supplemental oxygen. Lactate trended down to 1.6. Antibiotics were broadened to vancomycin and zosyn and C.diff was sent.    Patient denies foreign travel. Had no pets at home. No longer works. Has mainly indoor hobbies. Denies penile discharge. States that the pain underneath his scrotum is still present but has improved. Diarrhea still present.     Past Medical History:   Diagnosis " Date    Essential hypertension 1/14/2019       Past Surgical History:   Procedure Laterality Date    SKIN GRAFT         Review of patient's allergies indicates:  No Known Allergies    Medications:  Medications Prior to Admission   Medication Sig    ciprofloxacin HCl (CIPRO) 500 MG tablet Take 1 tablet (500 mg total) by mouth 2 (two) times daily. for 14 days     Antibiotics (From admission, onward)    Start     Stop Route Frequency Ordered    01/14/19 1315  vancomycin (VANCOCIN) 1,250 mg in dextrose 5 % 250 mL IVPB  (Vancomycin IVPB with levels panel)      -- IV Every 12 hours (non-standard times) 01/14/19 1214    01/14/19 0620  piperacillin-tazobactam 4.5 g in sodium chloride 0.9% 100 mL IVPB (ready to mix system)      -- IV Every 8 hours (non-standard times) 01/14/19 0056        Antifungals (From admission, onward)    None        Antivirals (From admission, onward)    None           Immunization History   Administered Date(s) Administered    Tdap 02/14/2015       Family History     None        Social History     Socioeconomic History    Marital status: Single     Spouse name: None    Number of children: None    Years of education: None    Highest education level: None   Social Needs    Financial resource strain: None    Food insecurity - worry: None    Food insecurity - inability: None    Transportation needs - medical: None    Transportation needs - non-medical: None   Occupational History    None   Tobacco Use    Smoking status: Current Every Day Smoker     Packs/day: 0.50    Smokeless tobacco: Never Used   Substance and Sexual Activity    Alcohol use: Yes     Alcohol/week: 6.0 oz     Types: 10 Cans of beer per week    Drug use: No    Sexual activity: None   Other Topics Concern    None   Social History Narrative    None     Review of Systems   Constitutional: Positive for appetite change, chills, diaphoresis, fatigue and fever.   Respiratory: Negative for cough and shortness of breath.     Cardiovascular: Negative for chest pain and leg swelling.   Gastrointestinal: Positive for diarrhea. Negative for abdominal pain, constipation, nausea and vomiting.   Genitourinary: Positive for flank pain, scrotal swelling and testicular pain. Negative for difficulty urinating, dysuria, frequency, hematuria and urgency.   Musculoskeletal: Positive for back pain. Negative for myalgias.   Skin: Negative for rash and wound.   Neurological: Positive for dizziness. Negative for light-headedness and headaches.   Psychiatric/Behavioral: Negative for agitation and behavioral problems. The patient is not nervous/anxious.      Objective:     Vital Signs (Most Recent):  Temp: 97.5 °F (36.4 °C) (01/16/19 1535)  Pulse: 69 (01/16/19 1539)  Resp: 18 (01/16/19 1125)  BP: 122/61 (01/16/19 1535)  SpO2: 97 % (01/16/19 1535) Vital Signs (24h Range):  Temp:  [97.4 °F (36.3 °C)-98.2 °F (36.8 °C)] 97.5 °F (36.4 °C)  Pulse:  [52-89] 69  Resp:  [18] 18  SpO2:  [92 %-98 %] 97 %  BP: (108-144)/(59-72) 122/61     Weight: 84.4 kg (186 lb 1.1 oz)  Body mass index is 27.48 kg/m².    Estimated Creatinine Clearance: 60.6 mL/min (based on SCr of 1.2 mg/dL).    Physical Exam   Constitutional: He is oriented to person, place, and time. He appears well-developed and well-nourished. No distress.   HENT:   Mouth/Throat: Uvula is midline, oropharynx is clear and moist and mucous membranes are normal. No oropharyngeal exudate, posterior oropharyngeal edema or posterior oropharyngeal erythema.   Cardiovascular: Normal rate and regular rhythm.   No murmur heard.  Pulmonary/Chest: Effort normal and breath sounds normal. No respiratory distress.   Abdominal: Soft. Bowel sounds are normal. He exhibits no distension.   Genitourinary: Testes normal. Right testis shows no swelling and no tenderness. Left testis shows no swelling and no tenderness. Uncircumcised. No penile erythema or penile tenderness. No discharge found.   Genitourinary Comments: No erythema  of perineum   Musculoskeletal: Normal range of motion. He exhibits no edema.   Lymphadenopathy:     He has no cervical adenopathy.   Neurological: He is alert and oriented to person, place, and time.   Skin: Skin is warm and dry.   Psychiatric: He has a normal mood and affect. His behavior is normal.       Significant Labs:   Blood Culture:   Recent Labs   Lab 01/13/19  1620 01/13/19  1627 01/15/19  1202 01/16/19  0416   LABBLOO Gram stain odin bottle: Gram positive cocci in clusters resembling Staph  Gram stain odin bottle: Gram positive rods  Gram stain odin bottle: Gram negative rods   Results called to and read back by: Cecilia Wolff RN  01/15/2019    05:27 Gram stain odin bottle: Gram positive cocci in clusters resembling Staph  Results called to and read back by:Sandi Bill RN  01/14/2019  13:42  STAPHYLOCOCCUS EPIDERMIDIS  Susceptibility pending   No Growth to date  No Growth to date  No Growth to date  No Growth to date No Growth to date  No Growth to date     CBC:   Recent Labs   Lab 01/15/19  0338 01/16/19  0416   WBC 13.59* 16.63*   HGB 12.5* 13.0*   HCT 36.7* 38.8*    177     CMP:   Recent Labs   Lab 01/15/19  0338 01/16/19  0416    143   K 3.4* 3.3*    108   CO2 22* 24   * 146*   BUN 17 28*   CREATININE 1.0 1.2   CALCIUM 8.7 8.8   PROT 7.0 7.1   ALBUMIN 2.5* 2.7*   BILITOT 0.7 0.6   ALKPHOS 112 119   AST 16 13   ALT 17 15   ANIONGAP 10 11   EGFRNONAA >60.0 >60.0     Urine Culture:   Recent Labs   Lab 01/12/19  2150 01/13/19  1938   LABURIN No growth No growth     Urine Studies:   Recent Labs   Lab 01/13/19 1938   COLORU Yellow   APPEARANCEUA Hazy*   PHUR 5.0   SPECGRAV 1.010   PROTEINUA Negative   GLUCUA Negative   KETONESU Negative   BILIRUBINUA Negative   OCCULTUA 1+*   NITRITE Negative   LEUKOCYTESUR 1+*   RBCUA 7*   WBCUA 17*   BACTERIA Rare   SQUAMEPITHEL 4     Wound Culture: No results for input(s): LABAERO in the last 4320 hours.    Significant Imaging: I  have reviewed all pertinent imaging results/findings within the past 24 hours.

## 2019-01-16 NOTE — PT/OT/SLP EVAL
Occupational Therapy   Evaluation and Discharge Note    Name: Brian Friedman  MRN: [id number]  Admitting Diagnosis:  Severe sepsis      Recommendations:     Discharge Recommendations: (home no OT needed)  Discharge Equipment Recommendations:  none  Barriers to discharge:  None    Assessment:     Brian Friedman is a 68 y.o. male with a medical diagnosis of Severe sepsis. At this time, patient is functioning at their prior level of function and does not require further acute OT services.     Plan:     During this hospitalization, patient does not require further acute OT services.  Please re-consult if situation changes.    · Plan of Care Reviewed with: patient    Subjective     Chief Complaint: none stated   Patient/Family Comments/goals: to go home    Occupational Profile:  Living Environment & PLOF: Pt's mother resides with pt in 1st floor condo with no steps to enter.  Pt has a bathtub for bathing.  Pt was independent with ADL's, cooking, cleaning, ambulation, & being the caregiver for his mother (has to lift on pt & physically care for pt).  Pt is an active .  Pt is retired from construction work.  Pt is in a bicycle club & enjoys riding bicycles.  Equipment Used at home:  (tall toilet)      Pain/Comfort:  · Pain Rating 1: 0/10  · Pain Rating Post-Intervention 1: 0/10    Patients cultural, spiritual, Yazdanism conflicts given the current situation: (none stated)    Objective:     Communicated with: RN prior to session.  Patient found up in chair with oxygen, peripheral IV upon OT entry to room.    General Precautions: Standard, fall     Occupational Performance:    Functional Mobility/Transfers:  · Patient completed Sit <> Stand Transfer with independence  with  no assistive device   · Functional Mobility: Independent with functional mobility in room     Activities of Daily Living:  · Upper Body Dressing: independence donning gown around back while standing   · Lower Body Dressing: independence donning socks  "seated in chair    Cognitive/Visual Perceptual:  Cognitive/Psychosocial Skills:     -       Oriented to: Person, Place, Time and Situation   -       Follows Commands/attention:Follows multistep  commands  -       Communication: clear/fluent  -       Memory: No Deficits noted  -       Safety awareness/insight to disability: intact   -       Mood/Affect/Coping skills/emotional control: Appropriate to situation    Physical Exam:  Postural examination/scapula alignment:    -       Rounded shoulders  Sensation:    -       Intact  Dominant hand:    -       left  Upper Extremity Range of Motion:     -       Right Upper Extremity: WNL  -       Left Upper Extremity: WNL  Upper Extremity Strength:    -       Right Upper Extremity: WFL  -       Left Upper Extremity: WFL   Strength:    -       Right Upper Extremity: WFL  -       Left Upper Extremity: WFL  Fine Motor Coordination:    -       Intact    AMPAC 6 Click ADL:  AMPAC Total Score: 24    Treatment & Education:  Provided education regarding role of OT, POC, & discharge recommendations with pt verbalizing understanding.  Pt had no further questions & when asked whether there were any concerns pt reported none.    Education:  Patient left up in chair with all lines intact, call button in reach, RN notified and white board updated.    GOALS:   Multidisciplinary Problems     Occupational Therapy Goals     Not on file          Multidisciplinary Problems (Resolved)        Problem: Occupational Therapy Goal    Goal Priority Disciplines Outcome Interventions   Occupational Therapy Goal   (Resolved)     OT, PT/OT Outcome(s) achieved                    History:     Past Medical History:   Diagnosis Date    Essential hypertension 2019       Past Surgical History:   Procedure Laterality Date    SKIN GRAFT         Clinical Decision Makin.  OT Low:  "Pt evaluation falls under low complexity for evaluation coding due to performance deficits noted in 1-3 areas as " "stated above and 0 co-morbities affecting current functional status. Data obtained from problem focused assessments. No modifications or assistance was required for completion of evaluation. Only brief occupational profile and history review completed."     Time Tracking:     OT Date of Treatment: 01/16/19  OT Start Time: 0753  OT Stop Time: 0808  OT Total Time (min): 15 min    Billable Minutes:Evaluation 15    ROBB Jurado  1/16/2019    "

## 2019-01-16 NOTE — SUBJECTIVE & OBJECTIVE
Past Medical History:   Diagnosis Date    Essential hypertension 1/14/2019       Past Surgical History:   Procedure Laterality Date    SKIN GRAFT         Review of patient's allergies indicates:  No Known Allergies    Medications:  Medications Prior to Admission   Medication Sig    ciprofloxacin HCl (CIPRO) 500 MG tablet Take 1 tablet (500 mg total) by mouth 2 (two) times daily. for 14 days     Antibiotics (From admission, onward)    Start     Stop Route Frequency Ordered    01/14/19 1315  vancomycin (VANCOCIN) 1,250 mg in dextrose 5 % 250 mL IVPB  (Vancomycin IVPB with levels panel)      -- IV Every 12 hours (non-standard times) 01/14/19 1214    01/14/19 0620  piperacillin-tazobactam 4.5 g in sodium chloride 0.9% 100 mL IVPB (ready to mix system)      -- IV Every 8 hours (non-standard times) 01/14/19 0056        Antifungals (From admission, onward)    None        Antivirals (From admission, onward)    None           Immunization History   Administered Date(s) Administered    Tdap 02/14/2015       Family History     None        Social History     Socioeconomic History    Marital status: Single     Spouse name: None    Number of children: None    Years of education: None    Highest education level: None   Social Needs    Financial resource strain: None    Food insecurity - worry: None    Food insecurity - inability: None    Transportation needs - medical: None    Transportation needs - non-medical: None   Occupational History    None   Tobacco Use    Smoking status: Current Every Day Smoker     Packs/day: 0.50    Smokeless tobacco: Never Used   Substance and Sexual Activity    Alcohol use: Yes     Alcohol/week: 6.0 oz     Types: 10 Cans of beer per week    Drug use: No    Sexual activity: None   Other Topics Concern    None   Social History Narrative    None     Review of Systems   Constitutional: Positive for appetite change, chills, diaphoresis, fatigue and fever.   Respiratory: Negative for  cough and shortness of breath.    Cardiovascular: Negative for chest pain and leg swelling.   Gastrointestinal: Positive for diarrhea. Negative for abdominal pain, constipation, nausea and vomiting.   Genitourinary: Positive for flank pain, scrotal swelling and testicular pain. Negative for difficulty urinating, dysuria, frequency, hematuria and urgency.   Musculoskeletal: Positive for back pain. Negative for myalgias.   Skin: Negative for rash and wound.   Neurological: Positive for dizziness. Negative for light-headedness and headaches.   Psychiatric/Behavioral: Negative for agitation and behavioral problems. The patient is not nervous/anxious.      Objective:     Vital Signs (Most Recent):  Temp: 97.5 °F (36.4 °C) (01/16/19 1535)  Pulse: 69 (01/16/19 1539)  Resp: 18 (01/16/19 1125)  BP: 122/61 (01/16/19 1535)  SpO2: 97 % (01/16/19 1535) Vital Signs (24h Range):  Temp:  [97.4 °F (36.3 °C)-98.2 °F (36.8 °C)] 97.5 °F (36.4 °C)  Pulse:  [52-89] 69  Resp:  [18] 18  SpO2:  [92 %-98 %] 97 %  BP: (108-144)/(59-72) 122/61     Weight: 84.4 kg (186 lb 1.1 oz)  Body mass index is 27.48 kg/m².    Estimated Creatinine Clearance: 60.6 mL/min (based on SCr of 1.2 mg/dL).    Physical Exam   Constitutional: He is oriented to person, place, and time. He appears well-developed and well-nourished. No distress.   HENT:   Mouth/Throat: Uvula is midline, oropharynx is clear and moist and mucous membranes are normal. No oropharyngeal exudate, posterior oropharyngeal edema or posterior oropharyngeal erythema.   Cardiovascular: Normal rate and regular rhythm.   No murmur heard.  Pulmonary/Chest: Effort normal and breath sounds normal. No respiratory distress.   Abdominal: Soft. Bowel sounds are normal. He exhibits no distension.   Genitourinary: Testes normal. Right testis shows no swelling and no tenderness. Left testis shows no swelling and no tenderness. Uncircumcised. No penile erythema or penile tenderness. No discharge found.    Genitourinary Comments: No erythema of perineum   Musculoskeletal: Normal range of motion. He exhibits no edema.   Lymphadenopathy:     He has no cervical adenopathy.   Neurological: He is alert and oriented to person, place, and time.   Skin: Skin is warm and dry.   Psychiatric: He has a normal mood and affect. His behavior is normal.       Significant Labs:   Blood Culture:   Recent Labs   Lab 01/13/19  1620 01/13/19  1627 01/15/19  1202 01/16/19  0416   LABBLOO Gram stain odin bottle: Gram positive cocci in clusters resembling Staph  Gram stain odin bottle: Gram positive rods  Gram stain odin bottle: Gram negative rods   Results called to and read back by: Cecilia Wolff RN  01/15/2019    05:27 Gram stain odin bottle: Gram positive cocci in clusters resembling Staph  Results called to and read back by:Sandi Bill RN  01/14/2019  13:42  STAPHYLOCOCCUS EPIDERMIDIS  Susceptibility pending   No Growth to date  No Growth to date  No Growth to date  No Growth to date No Growth to date  No Growth to date     CBC:   Recent Labs   Lab 01/15/19  0338 01/16/19  0416   WBC 13.59* 16.63*   HGB 12.5* 13.0*   HCT 36.7* 38.8*    177     CMP:   Recent Labs   Lab 01/15/19  0338 01/16/19  0416    143   K 3.4* 3.3*    108   CO2 22* 24   * 146*   BUN 17 28*   CREATININE 1.0 1.2   CALCIUM 8.7 8.8   PROT 7.0 7.1   ALBUMIN 2.5* 2.7*   BILITOT 0.7 0.6   ALKPHOS 112 119   AST 16 13   ALT 17 15   ANIONGAP 10 11   EGFRNONAA >60.0 >60.0     Urine Culture:   Recent Labs   Lab 01/12/19  2150 01/13/19 1938   LABURIN No growth No growth     Urine Studies:   Recent Labs   Lab 01/13/19 1938   COLORU Yellow   APPEARANCEUA Hazy*   PHUR 5.0   SPECGRAV 1.010   PROTEINUA Negative   GLUCUA Negative   KETONESU Negative   BILIRUBINUA Negative   OCCULTUA 1+*   NITRITE Negative   LEUKOCYTESUR 1+*   RBCUA 7*   WBCUA 17*   BACTERIA Rare   SQUAMEPITHEL 4     Wound Culture: No results for input(s): LABAERO in the last  4320 hours.    Significant Imaging: I have reviewed all pertinent imaging results/findings within the past 24 hours.

## 2019-01-16 NOTE — HPI
"Mr. Friedman is a 68 year old male with a history of HTN who presented to [facility] ED for evaluation of dizziness. The patient had been seen the previous day (1/12/19) in the ED complaining of dysuria and "brown" colored urine. He endorsed testicular pain that had been present for roughly 1 week. At that time urinalysis showed WBC's and occasional bacteria, cultures negative. He was started on cipro for presumptive prostatitis and discharged home. The patient had two doses of cipro total at home before representing to the ED. A this time he was endorsing dizziness, fatigue, and near syncope. He endorsed fevers, chills, rigors, and diarrhea at home. The patient states that he had been having diarrhea for roughly 4 days or so prior to presenting He denies nausea, vomiting, or abdominal pain. He denies sick contacts. At this time the patient denies dysuria, hematuria, pyuria, penial discharge, scrotal pain or swelling. He states he notices a pain in his perineal area when he sits and he does endorse mild low back pain across his lower back. Patient endorsing cough for past week productive of thick white sputum as well. The patient is sexually active with one long term partner and does not use condoms. He denies history of STI.      In the ED the patient was hypotensive, tachyonic, and tachycardic. Troponin was elevated and lactate was 3.4. The patient denied chest pain. He was given rocephin and 3L fluid resuscitation. Critical care was consulted for possible septic shock. The patient's initial blood pressure was 90/50 with a pulse rate of 152 and he was hypoxic (satting in the 80's). Patient's vitals improved with fluid resuscitation and supplemental oxygen. Lactate trended down to 1.6. Antibiotics were broadened to vancomycin and zosyn and C.diff was sent.    Patient denies foreign travel. Had no pets at home. No longer works. Has mainly indoor hobbies. Denies penile discharge. States that the pain underneath his scrotum " is still present but has improved. Diarrhea still present.

## 2019-01-17 LAB
ALBUMIN SERPL BCP-MCNC: 2.5 G/DL
ALP SERPL-CCNC: 82 U/L
ALT SERPL W/O P-5'-P-CCNC: 13 U/L
ANION GAP SERPL CALC-SCNC: 9 MMOL/L
AST SERPL-CCNC: 13 U/L
BASOPHILS # BLD AUTO: 0.03 K/UL
BASOPHILS NFR BLD: 0.3 %
BILIRUB SERPL-MCNC: 0.6 MG/DL
BUN SERPL-MCNC: 27 MG/DL
CALCIUM SERPL-MCNC: 8.9 MG/DL
CHLORIDE SERPL-SCNC: 109 MMOL/L
CO2 SERPL-SCNC: 26 MMOL/L
CREAT SERPL-MCNC: 1.1 MG/DL
CRYPTOSP AG STL QL IA: NEGATIVE
DIFFERENTIAL METHOD: ABNORMAL
EOSINOPHIL # BLD AUTO: 0 K/UL
EOSINOPHIL NFR BLD: 0 %
ERYTHROCYTE [DISTWIDTH] IN BLOOD BY AUTOMATED COUNT: 12.9 %
EST. GFR  (AFRICAN AMERICAN): >60 ML/MIN/1.73 M^2
EST. GFR  (NON AFRICAN AMERICAN): >60 ML/MIN/1.73 M^2
G LAMBLIA AG STL QL IA: NEGATIVE
GLUCOSE SERPL-MCNC: 75 MG/DL
HCT VFR BLD AUTO: 40.3 %
HGB BLD-MCNC: 13.3 G/DL
HIV 1+2 AB+HIV1 P24 AG SERPL QL IA: NEGATIVE
IMM GRANULOCYTES # BLD AUTO: 0.06 K/UL
IMM GRANULOCYTES NFR BLD AUTO: 0.6 %
LYMPHOCYTES # BLD AUTO: 2.1 K/UL
LYMPHOCYTES NFR BLD: 21.7 %
MCH RBC QN AUTO: 34.2 PG
MCHC RBC AUTO-ENTMCNC: 33 G/DL
MCV RBC AUTO: 104 FL
MONOCYTES # BLD AUTO: 1 K/UL
MONOCYTES NFR BLD: 10.6 %
NEUTROPHILS # BLD AUTO: 6.4 K/UL
NEUTROPHILS NFR BLD: 66.8 %
NRBC BLD-RTO: 0 /100 WBC
O+P STL TRI STN: NORMAL
OB PNL STL: POSITIVE
PHOSPHATE SERPL-MCNC: 4.3 MG/DL
PLATELET # BLD AUTO: 200 K/UL
PMV BLD AUTO: 10.5 FL
POCT GLUCOSE: 121 MG/DL (ref 70–110)
POCT GLUCOSE: 135 MG/DL (ref 70–110)
POCT GLUCOSE: 274 MG/DL (ref 70–110)
POCT GLUCOSE: 96 MG/DL (ref 70–110)
POTASSIUM SERPL-SCNC: 3.6 MMOL/L
PROT SERPL-MCNC: 6.6 G/DL
RBC # BLD AUTO: 3.89 M/UL
RV AG STL QL IA.RAPID: NEGATIVE
SODIUM SERPL-SCNC: 144 MMOL/L
VANCOMYCIN SERPL-MCNC: 14.4 UG/ML
WBC # BLD AUTO: 9.63 K/UL

## 2019-01-17 PROCEDURE — 99232 SBSQ HOSP IP/OBS MODERATE 35: CPT | Mod: GC,,, | Performed by: HOSPITALIST

## 2019-01-17 PROCEDURE — 25000003 PHARM REV CODE 250: Performed by: PHYSICIAN ASSISTANT

## 2019-01-17 PROCEDURE — 36415 COLL VENOUS BLD VENIPUNCTURE: CPT

## 2019-01-17 PROCEDURE — 25000003 PHARM REV CODE 250: Performed by: STUDENT IN AN ORGANIZED HEALTH CARE EDUCATION/TRAINING PROGRAM

## 2019-01-17 PROCEDURE — 87040 BLOOD CULTURE FOR BACTERIA: CPT

## 2019-01-17 PROCEDURE — S4991 NICOTINE PATCH NONLEGEND: HCPCS | Performed by: STUDENT IN AN ORGANIZED HEALTH CARE EDUCATION/TRAINING PROGRAM

## 2019-01-17 PROCEDURE — 63600175 PHARM REV CODE 636 W HCPCS: Performed by: STUDENT IN AN ORGANIZED HEALTH CARE EDUCATION/TRAINING PROGRAM

## 2019-01-17 PROCEDURE — 85025 COMPLETE CBC W/AUTO DIFF WBC: CPT

## 2019-01-17 PROCEDURE — 99232 PR SUBSEQUENT HOSPITAL CARE,LEVL II: ICD-10-PCS | Mod: GC,,, | Performed by: HOSPITALIST

## 2019-01-17 PROCEDURE — 84100 ASSAY OF PHOSPHORUS: CPT

## 2019-01-17 PROCEDURE — 92611 MOTION FLUOROSCOPY/SWALLOW: CPT

## 2019-01-17 PROCEDURE — 86703 HIV-1/HIV-2 1 RESULT ANTBDY: CPT

## 2019-01-17 PROCEDURE — 25000003 PHARM REV CODE 250: Performed by: INTERNAL MEDICINE

## 2019-01-17 PROCEDURE — 99233 PR SUBSEQUENT HOSPITAL CARE,LEVL III: ICD-10-PCS | Mod: ,,, | Performed by: INTERNAL MEDICINE

## 2019-01-17 PROCEDURE — 80053 COMPREHEN METABOLIC PANEL: CPT

## 2019-01-17 PROCEDURE — 80202 ASSAY OF VANCOMYCIN: CPT

## 2019-01-17 PROCEDURE — 99233 SBSQ HOSP IP/OBS HIGH 50: CPT | Mod: ,,, | Performed by: INTERNAL MEDICINE

## 2019-01-17 PROCEDURE — 63600175 PHARM REV CODE 636 W HCPCS: Performed by: INTERNAL MEDICINE

## 2019-01-17 PROCEDURE — 11000001 HC ACUTE MED/SURG PRIVATE ROOM

## 2019-01-17 PROCEDURE — 97161 PT EVAL LOW COMPLEX 20 MIN: CPT

## 2019-01-17 RX ORDER — VALACYCLOVIR HYDROCHLORIDE 500 MG/1
2000 TABLET, FILM COATED ORAL 2 TIMES DAILY
Status: COMPLETED | OUTPATIENT
Start: 2019-01-17 | End: 2019-01-18

## 2019-01-17 RX ORDER — DIPHENHYDRAMINE HCL 25 MG
25 CAPSULE ORAL DAILY PRN
Status: DISCONTINUED | OUTPATIENT
Start: 2019-01-17 | End: 2019-01-20 | Stop reason: HOSPADM

## 2019-01-17 RX ORDER — ACETAMINOPHEN 325 MG/1
650 TABLET ORAL EVERY 6 HOURS PRN
Status: DISCONTINUED | OUTPATIENT
Start: 2019-01-17 | End: 2019-01-20 | Stop reason: HOSPADM

## 2019-01-17 RX ADMIN — INSULIN ASPART 3 UNITS: 100 INJECTION, SOLUTION INTRAVENOUS; SUBCUTANEOUS at 12:01

## 2019-01-17 RX ADMIN — PIPERACILLIN AND TAZOBACTAM 4.5 G: 4; .5 INJECTION, POWDER, LYOPHILIZED, FOR SOLUTION INTRAVENOUS; PARENTERAL at 08:01

## 2019-01-17 RX ADMIN — NICOTINE 1 PATCH: 21 PATCH, EXTENDED RELEASE TRANSDERMAL at 09:01

## 2019-01-17 RX ADMIN — VALACYCLOVIR HYDROCHLORIDE 2000 MG: 500 TABLET, FILM COATED ORAL at 09:01

## 2019-01-17 RX ADMIN — ENOXAPARIN SODIUM 40 MG: 100 INJECTION SUBCUTANEOUS at 04:01

## 2019-01-17 RX ADMIN — VANCOMYCIN HYDROCHLORIDE 1250 MG: 10 INJECTION, POWDER, LYOPHILIZED, FOR SOLUTION INTRAVENOUS at 02:01

## 2019-01-17 RX ADMIN — ACETAMINOPHEN 650 MG: 325 TABLET ORAL at 09:01

## 2019-01-17 RX ADMIN — PIPERACILLIN AND TAZOBACTAM 4.5 G: 4; .5 INJECTION, POWDER, LYOPHILIZED, FOR SOLUTION INTRAVENOUS; PARENTERAL at 05:01

## 2019-01-17 RX ADMIN — POTASSIUM & SODIUM PHOSPHATES POWDER PACK 280-160-250 MG 2 PACKET: 280-160-250 PACK at 09:01

## 2019-01-17 RX ADMIN — VANCOMYCIN HYDROCHLORIDE 1250 MG: 10 INJECTION, POWDER, LYOPHILIZED, FOR SOLUTION INTRAVENOUS at 04:01

## 2019-01-17 RX ADMIN — ACETAMINOPHEN 650 MG: 325 TABLET ORAL at 04:01

## 2019-01-17 RX ADMIN — DIPHENHYDRAMINE HYDROCHLORIDE 25 MG: 25 CAPSULE ORAL at 11:01

## 2019-01-17 NOTE — SUBJECTIVE & OBJECTIVE
Interval History:   Patient has improved significantly with addition of lasix (discontinued), currently on RA sating at 94%.  No abscess has identified on CT A/P. Daily blood cultures, growing gram pos cocci and gram neg rods on 01/13. Will continue on Vanc and Zosyn. Patient is complaining of dysphagia, MBSS (01/17) is normal. Patient still complaining of testicular and perineal pain, scrotal US showed R hydrocele and L varicocele.    Review of Systems   Constitutional: Negative for diaphoresis and fatigue.   HENT: Negative for sore throat and trouble swallowing.    Respiratory: Negative for cough, chest tightness, shortness of breath and wheezing.    Cardiovascular: Negative for chest pain, palpitations and leg swelling.   Gastrointestinal: Negative for nausea and vomiting.   Genitourinary: Negative for dysuria and hematuria.   Neurological: Positive for weakness. Negative for headaches.     Objective:     Vital Signs (Most Recent):  Temp: 98.6 °F (37 °C) (01/17/19 1613)  Pulse: 70 (01/17/19 1613)  Resp: 18 (01/17/19 1613)  BP: 137/65 (01/17/19 1613)  SpO2: 95 % (01/17/19 1613) Vital Signs (24h Range):  Temp:  [97.9 °F (36.6 °C)-98.7 °F (37.1 °C)] 98.6 °F (37 °C)  Pulse:  [56-81] 70  Resp:  [16-18] 18  SpO2:  [92 %-96 %] 95 %  BP: (117-143)/(60-74) 137/65     Weight: 84.4 kg (186 lb 1.1 oz)  Body mass index is 27.48 kg/m².    Intake/Output Summary (Last 24 hours) at 1/17/2019 1629  Last data filed at 1/17/2019 0935  Gross per 24 hour   Intake 1200 ml   Output 1000 ml   Net 200 ml      Physical Exam   Constitutional: He is oriented to person, place, and time. Vital signs are normal. He appears well-developed. He is cooperative.   HENT:   Head: Normocephalic and atraumatic.   Eyes: Conjunctivae are normal. Pupils are equal, round, and reactive to light. Right eye exhibits no exudate. Left eye exhibits no exudate. Right conjunctiva has no hemorrhage. Left conjunctiva has no hemorrhage. Right eye exhibits no  nystagmus. Left eye exhibits no nystagmus.   Neck: Trachea normal. No neck rigidity. No tracheal deviation present.   Cardiovascular: Normal rate and regular rhythm. Exam reveals no gallop.   No murmur heard.  Pulmonary/Chest: Effort normal. No accessory muscle usage. No tachypnea. No respiratory distress. He has no wheezes. He has no rhonchi. He has no rales.   Abdominal: Soft. Normal appearance and bowel sounds are normal. There is no tenderness.   Musculoskeletal: Normal range of motion.   Neurological: He is alert and oriented to person, place, and time. No cranial nerve deficit. GCS eye subscore is 4. GCS verbal subscore is 5. GCS motor subscore is 6.   Skin: Skin is warm and dry. Capillary refill takes 2 to 3 seconds. He is not diaphoretic. No cyanosis. Nails show no clubbing.   Psychiatric: He has a normal mood and affect. His speech is normal and behavior is normal.   Nursing note and vitals reviewed.      Significant Labs:   ABGs:   No results for input(s): PH, PCO2, HCO3, POCSATURATED, BE, TOTALHB, COHB, METHB, O2HB, POCFIO2 in the last 48 hours.  CBC:   Recent Labs   Lab 01/16/19  0416 01/17/19  0630   WBC 16.63* 9.63   HGB 13.0* 13.3*   HCT 38.8* 40.3    200     CMP:   Recent Labs   Lab 01/16/19  0416 01/17/19  0630    144   K 3.3* 3.6    109   CO2 24 26   * 75   BUN 28* 27*   CREATININE 1.2 1.1   CALCIUM 8.8 8.9   PROT 7.1 6.6   ALBUMIN 2.7* 2.5*   BILITOT 0.6 0.6   ALKPHOS 119 82   AST 13 13   ALT 15 13   ANIONGAP 11 9   EGFRNONAA >60.0 >60.0     Magnesium:   No results for input(s): MG in the last 48 hours.  Urine Culture:   No results for input(s): LABURIN in the last 48 hours.  Urine Studies:   No results for input(s): COLORU, APPEARANCEUA, PHUR, SPECGRAV, PROTEINUA, GLUCUA, KETONESU, BILIRUBINUA, OCCULTUA, NITRITE, UROBILINOGEN, LEUKOCYTESUR, RBCUA, WBCUA, BACTERIA, SQUAMEPITHEL, HYALINECASTS in the last 48 hours.    Invalid input(s): HAILE    Significant Imaging:        1/15 CT ABDO-pelvis w/ contrast  1. No acute intra-abdominal abnormality identified.  2. Cholelithiasis.  3. Additional findings as above.    1/15 US Scrotum and testicles  No acute testicular pathology.  Right hydrocele and left varicocele present.  Small scrotal alejandro.  Small left epididymal head cysts.    1/14 CXR  The heart is not enlarged ill-defined patchy airspace disease and/or edema noted at the lung bases.  Upper lung fields are clear.  No pleural effusion.    1/14 CT abdo non-con  1. No acute intra-abdominal/pelvic CT abnormalities to account for the reported history of sepsis.  2. Right basilar subsegmental atelectasis or consolidation with trace associated dependent pleural fluid.  3. Cholelithiasis.  4. Subcentimeter hypodensity within the right hepatic dome, too small to accurately characterize.

## 2019-01-17 NOTE — PROCEDURES
Modified Barium Swallow    Patient Name:  Brian Friedman   MRN:  [id number]      Recommendations:     Recommendations:                General Recommendations:  ENT evaluation given long standing smoking history and Dysphagia therapy  Diet recommendations:  Regular, Thin(small single cup sips )   Aspiration Precautions: 1 bite/sip at a time, Double swallow with each bite/sip, Meds whole 1 at a time, No straws, Small bites/sips and Standard aspiration precautions   General Precautions: Standard,    Communication strategies:  none    Referral     Reason for Referral  Patient was referred for a Modified Barium Swallow Study to assess the efficiency of his/her swallow function, rule out aspiration and make recommendations regarding safe dietary consistencies, effective compensatory strategies, and safe eating environment.     Diagnosis: Sepsis       History:     Past Medical History:   Diagnosis Date    Essential hypertension 1/14/2019       Objective:     Current Respiratory Status: 01/17/19    Alert: yes    Cooperative: yes    Follows Directions: yes    Visualization  · Patient was seen in the lateral view    Oral Peripheral Examination  · Oral Musculature: WFL  · Dentition: present and adequate  · Mucosal Quality: good  · Mandibular Strength and Mobility: WFL  · Oral Labial Strength and Mobility: WFL  · Lingual Strength and Mobility: WFL  · Velar Elevation: WFL  · Buccal Strength and Mobility: WFL  · Volitional Cough: Present  · Volitional Swallow: Present  · Voice Prior to PO Intake: clear    Consistencies Assessed  · Thin 120mL via single cup sips and consecutive straw sips   · Nectar thick 90mL via open cup   · Solids x3    Oral Preparation/Oral Phase  · WFL- Pt with adequate bolus acceptance, containment, control and timely A-P transfer across consistencies \  · Pt with tendency to take very large bolus sip sizes of all liquid trials warranting verbal cueing to regulate and reduce sips size     Pharyngeal Phase    General function across consistencies:   · Pt with delayed swallow initiation across PO trials   · Pt with reduced hyolaryngeal elevation and excursion   · Pt with decreased base of tongue retraction   · Pt with inconsistent incomplete epiglottic inversion patterns   · Decreased pharyngeal peristalsis across trials      Thin Liquids:  · Pt with intermittent flash penetration during the swallow of thin liquids from open cup; penetration was redirected with the swallow ; any epiglottic undercoating was able to be cleared with a spontaneous throat clear and second swallow   · With single and consecutive straw sips pt consistent penetration patterns reaching the level of the vocal folds and ultimately trace amount appearing below the level of the vocal folds during the swallow. Pt did exhibit and appropriate throat clear response.   · With reduced bolus size, removal of straw paired with an effortful swallow the occurrence of penetration was completely eliminated   · Pt with mild-moderate valleculae residue and pyriform sinus residue post swallow which was essentially cleared with a cues second dry swallow   · High aspiration risk with large sip size and straw use      Nectar Thick Liquids:   · Swallow appearing slightly more timely in nature however spillage to pyriform sinuses remains prior to swallow initiation    · Pt without penetration and or aspiration with single sips/small bolus via open cup and straw  · Pt with mild valleculae residue, which was essentially cleared with a cued dry swallow   Solid:  · Pt without penetration and or aspiration  · There was mild valleculae stasis which was cleared with a second dry swallow       Cervical Esophageal Phase  · UES appeared to accommodate all bolus types without stasis or retrograde movement observed     Assessment:     Impressions  ·   Patient demonstrates mild pharyngeal dysphagia characterized by delayed swallow initiation, weakness and consistent penetration  during the swallow of thin liquids. .     Prognosis: Good    Barriers:  · Fatigue  · Cognitive status    Plan  Pt will work with SLP for compensatory strategies to enhance comfort and swallow safety during meals and to initiate swallow exercises     Education  Results were discussed with patient. Results were discussed with Medical Team who was in agreement with plan.     Goals:   Multidisciplinary Problems     SLP Goals        Problem: SLP Goal    Goal Priority Disciplines Outcome   SLP Goal     SLP    Description:  Speech Language Pathology Goals  Goals expected to be met by 1/23    1. Pt will tolerate a regular solid diet and thin liquids with no overt signs of airway compromise.  2. Pt will participate in a dysphagia ex x10 with SLP model   3. Pt will demonstrate independence with feeding and swallowing compensatory strategies                                 Plan:   · Patient to be seen:  Therapy Frequency: 3 x/week   · Plan of Care expires:  02/16/19  · Plan of Care reviewed with:  patient        Discharge recommendations:  (OP ST )   Barriers to Discharge:  None    Time Tracking:   SLP Treatment Date:   01/17/19  Speech Start Time:  1445  Speech Stop Time:  1506     Speech Total Time (min):  21 min    Keturah Hernandez CCC-SLP  01/17/2019

## 2019-01-17 NOTE — PHARMACY MED REC
"Admission Medication Reconciliation - Pharmacy Consult Note    The home medication history was taken by Sybil Streeter, Pharmacy Technician.  Based on information gathered and subsequent review by the clinical pharmacist, the items below may need attention.     You may go to "Admission" then "Reconcile Home Medications" tabs to review and/or act upon these items.     Potentially problematic discrepancies with current MAR  o Patient IS taking the following which was not ordered upon admit  o Lisinopril/HCTZ 10/12/5 mg QD     Please address this information as you see fit.  Feel free to contact us if you have any questions or require assistance.  Rina Murry  EXT [phone]      .    .            "

## 2019-01-17 NOTE — NURSING
Contacted IM-3.  Pt scheduled for 4th vancomycin dose.  Last trough drawn 1/16/19 at 0100.  Provider will reorder trough before scheduled dose.

## 2019-01-17 NOTE — PLAN OF CARE
Problem: Adult Inpatient Plan of Care  Goal: Plan of Care Review  Outcome: Ongoing (interventions implemented as appropriate)   01/16/19 7580   Plan of Care Review   Plan of Care Reviewed With patient   Progress no change   Patient AAOx4. Vital signs stable . No complaints of pain .IV antibiotics given per order. O2sats >Greater 92%, no complaints of SOB. Will continue to monitor.

## 2019-01-17 NOTE — PROGRESS NOTES
[facility]  Infectious Disease  Progress Note    Patient Name: Brian Friedman  MRN: [id number]  Admission Date: 1/13/2019  Length of Stay: 4 days  Attending Physician: Omega Lea MD  Primary Care Provider: Primary Doctor No    Isolation Status: No active isolations  Assessment/Plan:      * Sepsis    68 year old male presents with fevers, chills, rigors, diarrhea, dizziness, and perineal pain and found to be septic in the ER as he was febrile, tachycardic, hypotensive, hypoxic, with lactic acid of 3.4 and leukocytosis; CT abdomen/pelvis negative; ultrasound of scrotum negative; no signs of cellulitis on exam. 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 Today, patient with persistent scrotal/perineal pain today- concerning for epididymitis, however, ultrasound negative. GC/chlamydia negative.    Plan:  1. Continue empiric vanc and zosyn for now  2. F/u on blood cultures  3. 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 Recommend consult to urology given ongoing scrotal/perineal pain  5. Will follow       Thank you for your consult. I will follow-up with patient. Please contact us if you have any additional questions.  RASHID Morrison Pager: [phone]    Infectious Disease  [facility]    Subjective:     Principal Problem:Sepsis    HPI: Mr. Friedman is a 68 year old male with a history of HTN who presented to [facility] ED for evaluation of dizziness. The  "patient had been seen the previous day (1/12/19) in the ED complaining of dysuria and "brown" colored urine. He endorsed testicular pain that had been present for roughly 1 week. At that time urinalysis showed WBC's and occasional bacteria, cultures negative. He was started on cipro for presumptive prostatitis and discharged home. The patient had two doses of cipro total at home before representing to the ED. A this time he was endorsing dizziness, fatigue, and near syncope. He endorsed fevers, chills, rigors, and diarrhea at home. The patient states that he had been having diarrhea for roughly 4 days or so prior to presenting He denies nausea, vomiting, or abdominal pain. He denies sick contacts. At this time the patient denies dysuria, hematuria, pyuria, penial discharge, scrotal pain or swelling. He states he notices a pain in his perineal area when he sits and he does endorse mild low back pain across his lower back. Patient endorsing cough for past week productive of thick white sputum as well. The patient is sexually active with one long term partner and does not use condoms. He denies history of STI.      In the ED the patient was hypotensive, tachyonic, and tachycardic. Troponin was elevated and lactate was 3.4. The patient denied chest pain. He was given rocephin and 3L fluid resuscitation. Critical care was consulted for possible septic shock. The patient's initial blood pressure was 90/50 with a pulse rate of 152 and he was hypoxic (satting in the 80's). Patient's vitals improved with fluid resuscitation and supplemental oxygen. Lactate trended down to 1.6. Antibiotics were broadened to vancomycin and zosyn and C.diff was sent.    Patient denies foreign travel. Had no pets at home. No longer works. Has mainly indoor hobbies. Denies penile discharge. States that the pain underneath his scrotum is still present but has improved. Diarrhea still present.   Interval History: afebrile. Still complaining of " scrotal pain. Also has fever blisters to lower lip. No other new symptoms.     Review of Systems   Constitutional: Negative for appetite change, chills, diaphoresis, fatigue and fever.   HENT:        (+) fever blisters   Respiratory: Negative for cough and shortness of breath.    Cardiovascular: Negative for chest pain and leg swelling.   Gastrointestinal: Positive for diarrhea (improved). Negative for abdominal pain, constipation, nausea and vomiting.   Genitourinary: Positive for flank pain, scrotal swelling and testicular pain. Negative for difficulty urinating, dysuria, frequency, hematuria and urgency.   Musculoskeletal: Positive for back pain. Negative for myalgias.   Skin: Negative for rash and wound.   Neurological: Negative for dizziness, light-headedness and headaches.   Psychiatric/Behavioral: Negative for agitation and behavioral problems. The patient is not nervous/anxious.      Objective:     Vital Signs (Most Recent):  Temp: 98.7 °F (37.1 °C) (01/17/19 1131)  Pulse: 81 (01/17/19 1534)  Resp: 18 (01/17/19 1131)  BP: (!) 143/74 (01/17/19 1131)  SpO2: 95 % (01/17/19 1131) Vital Signs (24h Range):  Temp:  [97.9 °F (36.6 °C)-98.7 °F (37.1 °C)] 98.7 °F (37.1 °C)  Pulse:  [56-81] 81  Resp:  [16-18] 18  SpO2:  [92 %-96 %] 95 %  BP: (117-143)/(60-74) 143/74     Weight: 84.4 kg (186 lb 1.1 oz)  Body mass index is 27.48 kg/m².    Estimated Creatinine Clearance: 66.1 mL/min (based on SCr of 1.1 mg/dL).    Physical Exam   Constitutional: He is oriented to person, place, and time. He appears well-developed and well-nourished. No distress.   HENT:   Mouth/Throat: Uvula is midline, oropharynx is clear and moist and mucous membranes are normal. No oropharyngeal exudate, posterior oropharyngeal edema or posterior oropharyngeal erythema.   Fever blisters noted to bottom lip   Cardiovascular: Normal rate and regular rhythm.   No murmur heard.  Pulmonary/Chest: Effort normal and breath sounds normal. No respiratory  distress.   Abdominal: Soft. Bowel sounds are normal. He exhibits no distension.   Genitourinary: Right testis shows tenderness. Right testis shows no swelling. Left testis shows tenderness. Left testis shows no swelling. Uncircumcised. No penile erythema or penile tenderness. No discharge found.   Genitourinary Comments: TTP in the perineum; no erythema noted   Musculoskeletal: Normal range of motion. He exhibits no edema.   Neurological: He is alert and oriented to person, place, and time.   Skin: Skin is warm and dry.   Psychiatric: He has a normal mood and affect. His behavior is normal.       Significant Labs:   Blood Culture:   Recent Labs   Lab 01/13/19  1620 01/13/19  1627 01/15/19  1202 01/16/19  0416 01/17/19  0630   LABBLOO Gram stain odin bottle: Gram positive cocci in clusters resembling Staph  Gram stain odin bottle: Gram positive rods  Gram stain odin bottle: Gram negative rods   Results called to and read back by: Cecilia Wolff RN  01/15/2019    05:27  PREVOTELLA (B.) BIVIA Gram stain odin bottle: Gram positive cocci in clusters resembling Staph  Results called to and read back by:Sandi Bill RN  01/14/2019  13:42  STAPHYLOCOCCUS EPIDERMIDIS  Susceptibility pending   No Growth to date  No Growth to date  No Growth to date  No Growth to date  No Growth to date  No Growth to date No Growth to date  No Growth to date  No Growth to date  No Growth to date No Growth to date     CBC:   Recent Labs   Lab 01/16/19  0416 01/17/19  0630   WBC 16.63* 9.63   HGB 13.0* 13.3*   HCT 38.8* 40.3    200     CMP:   Recent Labs   Lab 01/16/19  0416 01/17/19  0630    144   K 3.3* 3.6    109   CO2 24 26   * 75   BUN 28* 27*   CREATININE 1.2 1.1   CALCIUM 8.8 8.9   PROT 7.1 6.6   ALBUMIN 2.7* 2.5*   BILITOT 0.6 0.6   ALKPHOS 119 82   AST 13 13   ALT 15 13   ANIONGAP 11 9   EGFRNONAA >60.0 >60.0     Urine Culture:   Recent Labs   Lab 01/12/19  2150 01/13/19  1938   LABURIN No  growth No growth     Urine Studies:   Recent Labs   Lab 01/13/19  1938   COLORU Yellow   APPEARANCEUA Hazy*   PHUR 5.0   SPECGRAV 1.010   PROTEINUA Negative   GLUCUA Negative   KETONESU Negative   BILIRUBINUA Negative   OCCULTUA 1+*   NITRITE Negative   LEUKOCYTESUR 1+*   RBCUA 7*   WBCUA 17*   BACTERIA Rare   SQUAMEPITHEL 4     Wound Culture: No results for input(s): LABAERO in the last 4320 hours.    Significant Imaging: I have reviewed all pertinent imaging results/findings within the past 24 hours.

## 2019-01-17 NOTE — PROGRESS NOTES
"[facility] Hospital Medicine  Progress Note    Patient Name: Brian Friedman  MRN: [id number]  Patient Class: IP- Inpatient   Admission Date: 1/13/2019  Length of Stay: 4 days  Attending Physician: Omega Lea MD  Primary Care Provider: Primary Doctor [facility] Medicine Team: [facility] 3 Chuck Gramajo MD    Subjective:     Principal Problem:Sepsis    HPI:  Mr. Friedman is a 68 year old male with a history of HTN who presented to [facility] ED for evaluation of dizziness. The patient had been seen the previous day (1/12/19) in the ED complaining of dysuria and "brown" colored urine. He endorsed testicular pain that had been present for roughly 1 week. At that time urinalysis showed WBC's and occasional bacteria, cultures are still pending. He was started on cipro for presumptive prostatitis and discharged home. The patient had two doses of cipro total at home before representing to the ED. A this time he was endorsing dizziness, fatigue, and near syncope. He endorsed fevers, chills, rigors, and diarrhea at home. The patient states that he had been having diarrhea for roughly 4 days or so prior to presenting. Patient describes watery brown stools roughly every 2 hours. He denies nausea, vomiting, or abdominal pain. The patient has had poor PO intake over those few days, eating and drinking very little because he did not want to have more diarrhea. He denies sick contacts. At this time the patient denies dysuria, hematuria, pyuria, penial discharge, scrotal pain or swelling. He states he notices a pain in his perineal area when he sits and he does endorse mild low back pain across his lower back. Patient endorsing cough for past week productive of thick white sputum as well. The patient is sexually active with one long term partner and does not use condoms. He denies history of STI. The patient states he drinks 1 pint of alcohol most days for the past 10-20 years. His last drink was 4 days " ago.    In the ED the patient was hypotensive, tachyonic, and tachycardic. Troponin was elevated and lactate was 3.4. The patient denied chest pain. He was given rocephin and 3L fluid resuscitation. Critical care was consulted for possible septic shock. The patient's initial blood pressure was 90/50 with a pulse rate of 152 and he was hypoxic (satting in the 80's). Patient's vitals improved with fluid resuscitation and supplemental oxygen. Lactate trended down to 1.6. Antibiotics were broadened to vancomycin and zosyn and C.diff was sent. Patient determined stable for floor and patient was admitted hospital medicine for further treatment of severe sepsis.      Hospital Course:  Brief HPI  Patient came to the ED 1/12 with urinary symptoms and fatigue. He was treated for prostatitis with Cipro (has taken 2 doses). This morning he awoke feeling more fatigued, short of breath, and dizzy described as near syncopal. Endorses fever, chills, and rigors.  Denies URI symptoms, chest pain, vomiting, abdominal pain, gross hematuria, melena or hematochezia. No prior abdominal surgeries. No known sick contacts or recent travel. Has mild low back pain across his back. His brother brings him to the ED for further evaluation. In the ED today (1/13) patient had hypotension, tachypnea and hypokalemia.  He additionally had an elevated troponin. ICU was initially consulted for sepsis and hypotension.        Mr. Friedman given IV fluid boluses to treat hypotension and shortly after developed some respiratory distress requiring application of BiPAP and a subsequent second critical care consult. Simultaneously he was given lasix. Approximately an hour later, he has appropriately urinated in response to the lasix and no longer requires BIPAP. His oxygenation was weaned down to facemask and he denies shortness of breath. Because he is returning to baseline, admission to MICU no longer necessary.     Patient improved significantly with  addition of lasix, oxygen requirements reduced to 4lpm NC.  Plan for abdo CT w/ contrast to look for possible abscess.  Daily blood cultures, growing gram pos cocci and gram neg rods.        Interval History:   Patient has improved significantly with addition of lasix (discontinued), currently on RA sating at 94%.  No abscess has identified on CT A/P. Daily blood cultures, growing gram pos cocci and gram neg rods on 01/13. Will continue on Vanc and Zosyn. Patient is complaining of dysphagia, MBSS (01/17) is normal. Patient still complaining of testicular and perineal pain, scrotal US showed R hydrocele and L varicocele.    Review of Systems   Constitutional: Negative for diaphoresis and fatigue.   HENT: Negative for sore throat and trouble swallowing.    Respiratory: Negative for cough, chest tightness, shortness of breath and wheezing.    Cardiovascular: Negative for chest pain, palpitations and leg swelling.   Gastrointestinal: Negative for nausea and vomiting.   Genitourinary: Negative for dysuria and hematuria.   Neurological: Positive for weakness. Negative for headaches.     Objective:     Vital Signs (Most Recent):  Temp: 98.6 °F (37 °C) (01/17/19 1613)  Pulse: 70 (01/17/19 1613)  Resp: 18 (01/17/19 1613)  BP: 137/65 (01/17/19 1613)  SpO2: 95 % (01/17/19 1613) Vital Signs (24h Range):  Temp:  [97.9 °F (36.6 °C)-98.7 °F (37.1 °C)] 98.6 °F (37 °C)  Pulse:  [56-81] 70  Resp:  [16-18] 18  SpO2:  [92 %-96 %] 95 %  BP: (117-143)/(60-74) 137/65     Weight: 84.4 kg (186 lb 1.1 oz)  Body mass index is 27.48 kg/m².    Intake/Output Summary (Last 24 hours) at 1/17/2019 1629  Last data filed at 1/17/2019 0935  Gross per 24 hour   Intake 1200 ml   Output 1000 ml   Net 200 ml      Physical Exam   Constitutional: He is oriented to person, place, and time. Vital signs are normal. He appears well-developed. He is cooperative.   HENT:   Head: Normocephalic and atraumatic.   Eyes: Conjunctivae are normal. Pupils are equal,  round, and reactive to light. Right eye exhibits no exudate. Left eye exhibits no exudate. Right conjunctiva has no hemorrhage. Left conjunctiva has no hemorrhage. Right eye exhibits no nystagmus. Left eye exhibits no nystagmus.   Neck: Trachea normal. No neck rigidity. No tracheal deviation present.   Cardiovascular: Normal rate and regular rhythm. Exam reveals no gallop.   No murmur heard.  Pulmonary/Chest: Effort normal. No accessory muscle usage. No tachypnea. No respiratory distress. He has no wheezes. He has no rhonchi. He has no rales.   Abdominal: Soft. Normal appearance and bowel sounds are normal. There is no tenderness.   Musculoskeletal: Normal range of motion.   Neurological: He is alert and oriented to person, place, and time. No cranial nerve deficit. GCS eye subscore is 4. GCS verbal subscore is 5. GCS motor subscore is 6.   Skin: Skin is warm and dry. Capillary refill takes 2 to 3 seconds. He is not diaphoretic. No cyanosis. Nails show no clubbing.   Psychiatric: He has a normal mood and affect. His speech is normal and behavior is normal.   Nursing note and vitals reviewed.      Significant Labs:   ABGs:   No results for input(s): PH, PCO2, HCO3, POCSATURATED, BE, TOTALHB, COHB, METHB, O2HB, POCFIO2 in the last 48 hours.  CBC:   Recent Labs   Lab 01/16/19  0416 01/17/19  0630   WBC 16.63* 9.63   HGB 13.0* 13.3*   HCT 38.8* 40.3    200     CMP:   Recent Labs   Lab 01/16/19  0416 01/17/19  0630    144   K 3.3* 3.6    109   CO2 24 26   * 75   BUN 28* 27*   CREATININE 1.2 1.1   CALCIUM 8.8 8.9   PROT 7.1 6.6   ALBUMIN 2.7* 2.5*   BILITOT 0.6 0.6   ALKPHOS 119 82   AST 13 13   ALT 15 13   ANIONGAP 11 9   EGFRNONAA >60.0 >60.0     Magnesium:   No results for input(s): MG in the last 48 hours.  Urine Culture:   No results for input(s): LABURIN in the last 48 hours.  Urine Studies:   No results for input(s): COLORU, APPEARANCEUA, PHUR, SPECGRAV, PROTEINUA, GLUCUA, KETONESU,  BILIRUBINUA, OCCULTUA, NITRITE, UROBILINOGEN, LEUKOCYTESUR, RBCUA, WBCUA, BACTERIA, SQUAMEPITHEL, HYALINECASTS in the last 48 hours.    Invalid input(s): HAILE    Significant Imagin/15 CT ABDO-pelvis w/ contrast  1. No acute intra-abdominal abnormality identified.  2. Cholelithiasis.  3. Additional findings as above.    1/15 US Scrotum and testicles  No acute testicular pathology.  Right hydrocele and left varicocele present.  Small scrotal alejandro.  Small left epididymal head cysts.     CXR  The heart is not enlarged ill-defined patchy airspace disease and/or edema noted at the lung bases.  Upper lung fields are clear.  No pleural effusion.     CT abdo non-con  1. No acute intra-abdominal/pelvic CT abnormalities to account for the reported history of sepsis.  2. Right basilar subsegmental atelectasis or consolidation with trace associated dependent pleural fluid.  3. Cholelithiasis.  4. Subcentimeter hypodensity within the right hepatic dome, too small to accurately characterize.    Assessment/Plan:      * Sepsis    Patient with recently diagnosed UTI, started on outpatient antibiotics the day prior to admission. He has been endorsing significant diarrhea for the past 4 days as well as decreased PO intake. On admission found to be febrile, tachycardic, and hypotensive. Lactate elevated at 3.4. Sepsis protocol initiated, given rocephin and IVF resuscitation, eventually broadened out to vancomycin and zosyn. Patient was hypoxic, requiring 3L nasal cannula. Patient with perineal tenderness, no obvious sign of Vicky's or cellulitis. No testicular swelling or prostate tenderness.     Unclear source. CXR negative for consolidation. Patient with reproducible perineal pain however no overlying cellulitis or crepitus. Patient has been having concurrent diarrhea for the past several days as well. Potassium, phosphorus, and magnesium all low on presentation to ED. Blood culture revealed gram positive  "cocci and gram negative rods.    Plan:  - ID consulted for staph bacteremia, appreciate their recs.  - CT abdomen pelvis and Scrotal ultrasound did not show any clear source of infection  - Continue antibiotics vanc and zosyn for now, will require extended abx course as blood cultures positive  - C.diff negative  - Replete electrolytes as needed     JUAN (acute kidney injury)    Patient presents with Cr 1.7. Baseline appears to be around 1.1 per chart review. Has been endorsing poor PO intake in combination with severe diarrhea over past several days.    - Likely prerenal, given IVF's in ED and Cr level has improved.  - Will follow daily Cr level.       Acute respiratory failure with hypoxia    - Patient with sepsis not on home oxygen but requiring 3L nasal cannula in ED. CXR showing no sign of intrapulmonary pathology. Well's score is 0. Patient not complaining of chest pain.    - Patient developed severe pulmonary edema after initiation of sepsis ordered fluids (~3L)  - Improved significantly with diuresis, lasix 40mg BID IV (discontinued)  - currently on RA sating at 94%       Dysphagia    - Patient is complaining of difficulty swallowing to both solids and liquids.  - SLP was consulted for evaluation.  - MBSS on 01/17 showed no abnormalities.       Hypotension    - improving significantly  - see "sepsis" above       Discharge planning issues    Patient lives at home with another family member, able to complete ADL's without difficulty    - PT/OT consulted  - Social work consulted       Acute cystitis without hematuria    Patient diagnosed with UTI in ED on 1/12/19    - Urinalysis notable for WBCs and bacteria. Prescribed Cipro, patient took two doses, and reported improvement in perineal pain with medication  - Now presenting with sepsis, likely combined with dehydration 2/2 concomitant diarrheal illness, urine culture is negative  - Prostate nontender, firm on exam  - Now on broad spectrum antibiotics  - see " ""sepsis"     Essential hypertension    Patient diagnosed with hypertension within the past year. Patient states he is on an antihypertensive at home but is unclear of the name. No documentation in chart as to what patient is taking. Patient states he has not taken the medication for several days and often forgets dose.    - given hypotension requiring IVF resuscitation at time of admission, will continue holding any antihypertensives         VTE Risk Mitigation (From admission, onward)        Ordered     enoxaparin injection 40 mg  Daily      01/15/19 1155     Place sequential compression device  Until discontinued      01/13/19 2327     IP VTE LOW RISK PATIENT  Once      01/13/19 2328              Chuck Gramajo MD  Department of Hospital Medicine   [facility]  "

## 2019-01-17 NOTE — NURSING
Contacted IM-3.  Pt with swollen upper and lower lip, swelling extending to upper chin.  Pt reports start of swelling x 2 days.  Stopped zosyn infusion.  Provider made aware.  Will come assess pt.  Provider will order benadryl.

## 2019-01-17 NOTE — SUBJECTIVE & OBJECTIVE
Interval History: afebrile. Still complaining of scrotal pain. Also has fever blisters to lower lip. No other new symptoms.     Review of Systems   Constitutional: Negative for appetite change, chills, diaphoresis, fatigue and fever.   HENT:        (+) fever blisters   Respiratory: Negative for cough and shortness of breath.    Cardiovascular: Negative for chest pain and leg swelling.   Gastrointestinal: Positive for diarrhea (improved). Negative for abdominal pain, constipation, nausea and vomiting.   Genitourinary: Positive for flank pain, scrotal swelling and testicular pain. Negative for difficulty urinating, dysuria, frequency, hematuria and urgency.   Musculoskeletal: Positive for back pain. Negative for myalgias.   Skin: Negative for rash and wound.   Neurological: Negative for dizziness, light-headedness and headaches.   Psychiatric/Behavioral: Negative for agitation and behavioral problems. The patient is not nervous/anxious.      Objective:     Vital Signs (Most Recent):  Temp: 98.7 °F (37.1 °C) (01/17/19 1131)  Pulse: 81 (01/17/19 1534)  Resp: 18 (01/17/19 1131)  BP: (!) 143/74 (01/17/19 1131)  SpO2: 95 % (01/17/19 1131) Vital Signs (24h Range):  Temp:  [97.9 °F (36.6 °C)-98.7 °F (37.1 °C)] 98.7 °F (37.1 °C)  Pulse:  [56-81] 81  Resp:  [16-18] 18  SpO2:  [92 %-96 %] 95 %  BP: (117-143)/(60-74) 143/74     Weight: 84.4 kg (186 lb 1.1 oz)  Body mass index is 27.48 kg/m².    Estimated Creatinine Clearance: 66.1 mL/min (based on SCr of 1.1 mg/dL).    Physical Exam   Constitutional: He is oriented to person, place, and time. He appears well-developed and well-nourished. No distress.   HENT:   Mouth/Throat: Uvula is midline, oropharynx is clear and moist and mucous membranes are normal. No oropharyngeal exudate, posterior oropharyngeal edema or posterior oropharyngeal erythema.   Fever blisters noted to bottom lip   Cardiovascular: Normal rate and regular rhythm.   No murmur heard.  Pulmonary/Chest: Effort  normal and breath sounds normal. No respiratory distress.   Abdominal: Soft. Bowel sounds are normal. He exhibits no distension.   Genitourinary: Right testis shows tenderness. Right testis shows no swelling. Left testis shows tenderness. Left testis shows no swelling. Uncircumcised. No penile erythema or penile tenderness. No discharge found.   Genitourinary Comments: TTP in the perineum; no erythema noted   Musculoskeletal: Normal range of motion. He exhibits no edema.   Neurological: He is alert and oriented to person, place, and time.   Skin: Skin is warm and dry.   Psychiatric: He has a normal mood and affect. His behavior is normal.       Significant Labs:   Blood Culture:   Recent Labs   Lab 01/13/19  1620 01/13/19  1627 01/15/19  1202 01/16/19  0416 01/17/19  0630   LABBLOO Gram stain odin bottle: Gram positive cocci in clusters resembling Staph  Gram stain odin bottle: Gram positive rods  Gram stain odin bottle: Gram negative rods   Results called to and read back by: Cecilia Wolff RN  01/15/2019    05:27  PREVOTELLA (B.) BIVIA Gram stain odin bottle: Gram positive cocci in clusters resembling Staph  Results called to and read back by:Sandi Bill RN  01/14/2019  13:42  STAPHYLOCOCCUS EPIDERMIDIS  Susceptibility pending   No Growth to date  No Growth to date  No Growth to date  No Growth to date  No Growth to date  No Growth to date No Growth to date  No Growth to date  No Growth to date  No Growth to date No Growth to date     CBC:   Recent Labs   Lab 01/16/19  0416 01/17/19  0630   WBC 16.63* 9.63   HGB 13.0* 13.3*   HCT 38.8* 40.3    200     CMP:   Recent Labs   Lab 01/16/19  0416 01/17/19  0630    144   K 3.3* 3.6    109   CO2 24 26   * 75   BUN 28* 27*   CREATININE 1.2 1.1   CALCIUM 8.8 8.9   PROT 7.1 6.6   ALBUMIN 2.7* 2.5*   BILITOT 0.6 0.6   ALKPHOS 119 82   AST 13 13   ALT 15 13   ANIONGAP 11 9   EGFRNONAA >60.0 >60.0     Urine Culture:   Recent Labs   Lab  01/12/19  2150 01/13/19  1938   LABURIN No growth No growth     Urine Studies:   Recent Labs   Lab 01/13/19 1938   COLORU Yellow   APPEARANCEUA Hazy*   PHUR 5.0   SPECGRAV 1.010   PROTEINUA Negative   GLUCUA Negative   KETONESU Negative   BILIRUBINUA Negative   OCCULTUA 1+*   NITRITE Negative   LEUKOCYTESUR 1+*   RBCUA 7*   WBCUA 17*   BACTERIA Rare   SQUAMEPITHEL 4     Wound Culture: No results for input(s): LABAERO in the last 4320 hours.    Significant Imaging: I have reviewed all pertinent imaging results/findings within the past 24 hours.

## 2019-01-17 NOTE — PLAN OF CARE
Rounded with IM3. Patient bacteremic, waiting for sensitivities. Patient functionally independent. No therapy needs on discharge. Will follow.

## 2019-01-17 NOTE — PT/OT/SLP EVAL
"Physical Therapy Evaluation and Discharge Note    Patient Name:  Brian Friedman   MRN:  [id number]    Recommendations:     Discharge Recommendations:  (home no needs)   Discharge Equipment Recommendations: none   Barriers to discharge: None    Assessment:     Brian Friedman is a 68 y.o. male admitted with a medical diagnosis of Sepsis. Pt completed functional mobility without physical assist of use of DME. Ambulated greater than household distance with no LOB or gait deviations noted. Pt initially complaining of difficulty sitting, but later reported that it is due to penile pain.  At this time, patient is functioning at their prior level of function and does not require further acute PT services.     Recent Surgery: * No surgery found *      Plan:     During this hospitalization, patient does not require further acute PT services.  Please re-consult if situation changes.      Subjective     Chief Complaint: penile pain  Patient/Family Comments/goals: "I've gotten worse. It hurts to sit up." "Oh I got no problem with walking. I do that perfectly."   Pain/Comfort:  · Pain Rating 1: (did not rate)  · Location 1: penis  · Pain Addressed 1: Distraction    Patients cultural, spiritual, Islam conflicts given the current situation: no    Living Environment:  Pt's mother lives with pt in 1st floor condo with 0 JUNE. PTA, pt was independent with ADLs and ambulation. 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  Equipment used at home: raised toilet.  Upon discharge, patient will have assistance from family.    Objective:     Communicated with RN prior to session.  Patient found supine upon PT entry to room found with: (no lines connected during session)     General Precautions: Standard, fall   Orthopedic Precautions:N/A   Braces: N/A     Exams:  · Cognitive Exam:  Patient is oriented to Person, Place, Time and Situation  · RLE ROM: WFL  · RLE Strength: WFL  · LLE ROM: WFL  · LLE Strength: " WFL    Functional Mobility:  · Bed Mobility:     · Supine to Sit: modified independence  · Transfers:     · Sit to Stand:  independence with no AD  · Gait: ~220 ft. with no AD and with independence  · No significant gait deviations noted     AM-PAC 6 CLICK MOBILITY  Total Score:23       Therapeutic Activities and Exercises:   Pt educated on role of PT and PT POC, including plan to d/c IP PT services at this time. Pt instructed to contact medical team if therapy needs arise during hospital admission. Pt verbalized understanding.     AM-PAC 6 CLICK MOBILITY  Total Score:23     Patient left up in chair with all lines intact and call button in reach.    GOALS:   Multidisciplinary Problems     Physical Therapy Goals     Not on file          Multidisciplinary Problems (Resolved)        Problem: Physical Therapy Goal    Goal Priority Disciplines Outcome Goal Variances Interventions   Physical Therapy Goal   (Resolved)     PT, PT/OT Outcome(s) achieved                     History:     Past Medical History:   Diagnosis Date    Essential hypertension 1/14/2019       Past Surgical History:   Procedure Laterality Date    SKIN GRAFT         Clinical Decision Making:     History  Co-morbidities and personal factors that may impact the plan of care Examination  Body Structures and Functions, activity limitations and participation restrictions that may impact the plan of care Clinical Presentation   Decision Making/ Complexity Score   Co-morbidities:   [] Time since onset of injury / illness / exacerbation  [] Status of current condition  []Patient's cognitive status and safety concerns    [] Multiple Medical Problems (see med hx)  Personal Factors:   [x] Patient's age  [] Prior Level of function   [] Patient's home situation (environment and family support)  [] Patient's level of motivation  [] Expected progression of patient      HISTORY:(criteria)    [] 73221 - no personal factors/history    [x] 92966 - has 1-2 personal  factor/comorbidity     [] 07653 - has >3 personal factor/comorbidity     Body Regions:  [] Objective examination findings  [] Head     []  Neck  [] Trunk   [] Upper Extremity  [] Lower Extremity    Body Systems:  [] For communication ability, affect, cognition, language, and learning style: the assessment of the ability to make needs known, consciousness, orientation (person, place, and time), expected emotional /behavioral responses, and learning preferences (eg, learning barriers, education  needs)  [] For the neuromuscular system: a general assessment of gross coordinated movement (eg, balance, gait, locomotion, transfers, and transitions) and motor function  (motor control and motor learning)  [] For the musculoskeletal system: the assessment of gross symmetry, gross range of motion, gross strength, height, and weight  [] For the integumentary system: the assessment of pliability(texture), presence of scar formation, skin color, and skin integrity  [] For cardiovascular/pulmonary system: the assessment of heart rate, respiratory rate, blood pressure, and edema     Activity limitations:    [] Patient's cognitive status and saf ety concerns          [x] Status of current condition      [] Weight bearing restriction  [] Cardiopulmunary Restriction    Participation Restrictions:   [] Goals and goal agreement with the patient     [] Rehab potential (prognosis) and probable outcome      Examination of Body System: (criteria)    [x] 99077 - addressing 1-2 elements    [] 35023 - addressing a total of 3 or more elements     [] 08259 -  Addressing a total of 4 or more elements         Clinical Presentation: (criteria)  Stable - 69826     On examination of body system using standardized tests and measures patient presents with 1-2 elements from any of the following: body structures and functions, activity limitations, and/or participation restrictions.  Leading to a clinical presentation that is considered stable and/or  uncomplicated                              Clinical Decision Making  (Eval Complexity):  Low- 99126     Time Tracking:     PT Received On: 01/17/19  PT Start Time: 1104     PT Stop Time: 1115  PT Total Time (min): 11 min     Billable Minutes: Evaluation 11    Kristie Mendosa, PT, DPT   1/17/2019  [phone]

## 2019-01-17 NOTE — ASSESSMENT & PLAN NOTE
- Patient is complaining of difficulty swallowing to both solids and liquids.  - SLP was consulted for evaluation.  - MBSS on 01/17 showed no abnormalities.

## 2019-01-17 NOTE — PLAN OF CARE
Problem: Physical Therapy Goal  Goal: Physical Therapy Goal  Outcome: Outcome(s) achieved Date Met: 01/17/19  PT evaluation complete. No goals established as pt is baseline with mobility and has no acute PT needs at this time. D/C from PT services.    Kristie Mendosa, PT, DPT   1/17/2019  [phone]

## 2019-01-17 NOTE — ASSESSMENT & PLAN NOTE
68 year old male presents with fevers, chills, rigors, diarrhea, dizziness, and perineal pain and found to be septic in the ER as he was febrile, tachycardic, hypotensive, hypoxic, with lactic acid of 3.4 and leukocytosis; CT abdomen/pelvis negative; ultrasound of scrotum negative; no signs of cellulitis on exam. 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 Today, patient with persistent scrotal/perineal pain today- concerning for epididymitis, however, ultrasound negative. GC/chlamydia negative.    Plan:  1. Continue empiric vanc and zosyn for now  2. F/u on blood cultures  3. 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 Recommend consult to urology given ongoing scrotal/perineal pain  5. Will follow

## 2019-01-18 PROBLEM — I95.9 HYPOTENSION: Status: RESOLVED | Noted: 2019-01-14 | Resolved: 2019-01-18

## 2019-01-18 PROBLEM — R10.2 PERINEAL PAIN: Status: ACTIVE | Noted: 2019-01-18

## 2019-01-18 PROBLEM — N17.9 AKI (ACUTE KIDNEY INJURY): Status: RESOLVED | Noted: 2019-01-14 | Resolved: 2019-01-18

## 2019-01-18 LAB
ALBUMIN SERPL BCP-MCNC: 2.5 G/DL
ALP SERPL-CCNC: 97 U/L
ALT SERPL W/O P-5'-P-CCNC: 16 U/L
ANION GAP SERPL CALC-SCNC: 7 MMOL/L
AST SERPL-CCNC: 18 U/L
BACTERIA BLD CULT: NORMAL
BASOPHILS # BLD AUTO: 0.02 K/UL
BASOPHILS NFR BLD: 0.2 %
BILIRUB SERPL-MCNC: 0.7 MG/DL
BUN SERPL-MCNC: 17 MG/DL
CALCIUM SERPL-MCNC: 8.5 MG/DL
CHLORIDE SERPL-SCNC: 108 MMOL/L
CO2 SERPL-SCNC: 27 MMOL/L
CREAT SERPL-MCNC: 1 MG/DL
DIFFERENTIAL METHOD: ABNORMAL
EOSINOPHIL # BLD AUTO: 0 K/UL
EOSINOPHIL NFR BLD: 0.2 %
ERYTHROCYTE [DISTWIDTH] IN BLOOD BY AUTOMATED COUNT: 12.8 %
EST. GFR  (AFRICAN AMERICAN): >60 ML/MIN/1.73 M^2
EST. GFR  (NON AFRICAN AMERICAN): >60 ML/MIN/1.73 M^2
GLUCOSE SERPL-MCNC: 83 MG/DL
HCT VFR BLD AUTO: 38.2 %
HGB BLD-MCNC: 12.6 G/DL
IMM GRANULOCYTES # BLD AUTO: 0.14 K/UL
IMM GRANULOCYTES NFR BLD AUTO: 1.1 %
LYMPHOCYTES # BLD AUTO: 1.8 K/UL
LYMPHOCYTES NFR BLD: 14.5 %
MAGNESIUM SERPL-MCNC: 2 MG/DL
MCH RBC QN AUTO: 33.3 PG
MCHC RBC AUTO-ENTMCNC: 33 G/DL
MCV RBC AUTO: 101 FL
MONOCYTES # BLD AUTO: 0.9 K/UL
MONOCYTES NFR BLD: 7.7 %
NEUTROPHILS # BLD AUTO: 9.3 K/UL
NEUTROPHILS NFR BLD: 76.3 %
NRBC BLD-RTO: 0 /100 WBC
PLATELET # BLD AUTO: 231 K/UL
PMV BLD AUTO: 10.9 FL
POCT GLUCOSE: 123 MG/DL (ref 70–110)
POCT GLUCOSE: 155 MG/DL (ref 70–110)
POCT GLUCOSE: 86 MG/DL (ref 70–110)
POCT GLUCOSE: 87 MG/DL (ref 70–110)
POTASSIUM SERPL-SCNC: 3.7 MMOL/L
PROT SERPL-MCNC: 6.5 G/DL
RBC # BLD AUTO: 3.78 M/UL
SODIUM SERPL-SCNC: 142 MMOL/L
WBC # BLD AUTO: 12.24 K/UL

## 2019-01-18 PROCEDURE — 83735 ASSAY OF MAGNESIUM: CPT

## 2019-01-18 PROCEDURE — 11000001 HC ACUTE MED/SURG PRIVATE ROOM

## 2019-01-18 PROCEDURE — 63600175 PHARM REV CODE 636 W HCPCS: Performed by: INTERNAL MEDICINE

## 2019-01-18 PROCEDURE — 99232 SBSQ HOSP IP/OBS MODERATE 35: CPT | Mod: ,,, | Performed by: INTERNAL MEDICINE

## 2019-01-18 PROCEDURE — 25000003 PHARM REV CODE 250: Performed by: PHYSICIAN ASSISTANT

## 2019-01-18 PROCEDURE — 92526 ORAL FUNCTION THERAPY: CPT

## 2019-01-18 PROCEDURE — 25000003 PHARM REV CODE 250: Performed by: INTERNAL MEDICINE

## 2019-01-18 PROCEDURE — 25000003 PHARM REV CODE 250: Performed by: STUDENT IN AN ORGANIZED HEALTH CARE EDUCATION/TRAINING PROGRAM

## 2019-01-18 PROCEDURE — 85025 COMPLETE CBC W/AUTO DIFF WBC: CPT

## 2019-01-18 PROCEDURE — 99232 SBSQ HOSP IP/OBS MODERATE 35: CPT | Mod: GC,,, | Performed by: HOSPITALIST

## 2019-01-18 PROCEDURE — 99232 PR SUBSEQUENT HOSPITAL CARE,LEVL II: ICD-10-PCS | Mod: ,,, | Performed by: INTERNAL MEDICINE

## 2019-01-18 PROCEDURE — 36415 COLL VENOUS BLD VENIPUNCTURE: CPT

## 2019-01-18 PROCEDURE — S4991 NICOTINE PATCH NONLEGEND: HCPCS | Performed by: STUDENT IN AN ORGANIZED HEALTH CARE EDUCATION/TRAINING PROGRAM

## 2019-01-18 PROCEDURE — 97535 SELF CARE MNGMENT TRAINING: CPT

## 2019-01-18 PROCEDURE — 63600175 PHARM REV CODE 636 W HCPCS: Performed by: STUDENT IN AN ORGANIZED HEALTH CARE EDUCATION/TRAINING PROGRAM

## 2019-01-18 PROCEDURE — 80053 COMPREHEN METABOLIC PANEL: CPT

## 2019-01-18 PROCEDURE — 87040 BLOOD CULTURE FOR BACTERIA: CPT

## 2019-01-18 PROCEDURE — 99232 PR SUBSEQUENT HOSPITAL CARE,LEVL II: ICD-10-PCS | Mod: GC,,, | Performed by: HOSPITALIST

## 2019-01-18 RX ORDER — METRONIDAZOLE 500 MG/1
500 TABLET ORAL EVERY 8 HOURS
Status: DISCONTINUED | OUTPATIENT
Start: 2019-01-18 | End: 2019-01-20 | Stop reason: HOSPADM

## 2019-01-18 RX ADMIN — ENOXAPARIN SODIUM 40 MG: 100 INJECTION SUBCUTANEOUS at 05:01

## 2019-01-18 RX ADMIN — NICOTINE 1 PATCH: 21 PATCH, EXTENDED RELEASE TRANSDERMAL at 08:01

## 2019-01-18 RX ADMIN — PIPERACILLIN AND TAZOBACTAM 4.5 G: 4; .5 INJECTION, POWDER, LYOPHILIZED, FOR SOLUTION INTRAVENOUS; PARENTERAL at 04:01

## 2019-01-18 RX ADMIN — VALACYCLOVIR HYDROCHLORIDE 2000 MG: 500 TABLET, FILM COATED ORAL at 08:01

## 2019-01-18 RX ADMIN — METRONIDAZOLE 500 MG: 500 TABLET ORAL at 09:01

## 2019-01-18 RX ADMIN — VANCOMYCIN HYDROCHLORIDE 1250 MG: 10 INJECTION, POWDER, LYOPHILIZED, FOR SOLUTION INTRAVENOUS at 11:01

## 2019-01-18 RX ADMIN — PIPERACILLIN AND TAZOBACTAM 4.5 G: 4; .5 INJECTION, POWDER, LYOPHILIZED, FOR SOLUTION INTRAVENOUS; PARENTERAL at 11:01

## 2019-01-18 NOTE — ASSESSMENT & PLAN NOTE
68 year old male presents with fevers, chills, rigors, diarrhea, dizziness, and perineal pain and found to be septic in the ER as he was febrile, tachycardic, hypotensive, hypoxic, with lactic acid of 3.4 and leukocytosis; CT abdomen/pelvis negative; ultrasound of scrotum negative; no signs of cellulitis on exam. pt started on empiric antibiotics and has improved; afebrile and WBC count normalized.    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 Patient with persistent scrotal/perineal pain today- concerning for epididymitis, however, ultrasound negative. GC/chlamydia negative.     Plan:  1. F/u on urology recs  2. D/c vancomycin and zosyn; start rocephin 2 gram IV q 24 hours and flagyl 500 mg PO TID   3. Anticipate at least 2 weeks of antibiotic treatment given bacteremia  4. Will follow

## 2019-01-18 NOTE — PLAN OF CARE
Problem: Adult Inpatient Plan of Care  Goal: Plan of Care Review  Outcome: Ongoing (interventions implemented as appropriate)  POC reviewed with pt and spouse. AAO x4  Pt remained free from falls. IV antibiotics administered. accu-checks. Telemetry monitoring. Questions and concerns addressed. Pt progressing towards goals. Will continue to monitor. See flow sheets for full assessment and VS

## 2019-01-18 NOTE — ASSESSMENT & PLAN NOTE
- Patient has 8/10 sharp pain which is getting worse by sitting up and improve with walking.  - Started on Ibuprofen 600 mg TID prn.  - Scrotal US showed R hydrocele and L varicocele.  - Urology was consulted, appreciate their recs.

## 2019-01-18 NOTE — PROGRESS NOTES
"[facility] Hospital Medicine  Progress Note    Patient Name: Brian Friedman  MRN: [id number]  Patient Class: IP- Inpatient   Admission Date: 1/13/2019  Length of Stay: 5 days  Attending Physician: Omega Lea MD  Primary Care Provider: Primary Doctor [facility] Medicine Team: [facility] HOSP MED 3 Chuck Gramajo MD    Subjective:     Principal Problem:Sepsis    HPI:  Mr. Friedman is a 68 year old male with a history of HTN who presented to [facility] ED for evaluation of dizziness. The patient had been seen the previous day (1/12/19) in the ED complaining of dysuria and "brown" colored urine. He endorsed testicular pain that had been present for roughly 1 week. At that time urinalysis showed WBC's and occasional bacteria, cultures are still pending. He was started on cipro for presumptive prostatitis and discharged home. The patient had two doses of cipro total at home before representing to the ED. A this time he was endorsing dizziness, fatigue, and near syncope. He endorsed fevers, chills, rigors, and diarrhea at home. The patient states that he had been having diarrhea for roughly 4 days or so prior to presenting. Patient describes watery brown stools roughly every 2 hours. He denies nausea, vomiting, or abdominal pain. The patient has had poor PO intake over those few days, eating and drinking very little because he did not want to have more diarrhea. He denies sick contacts. At this time the patient denies dysuria, hematuria, pyuria, penial discharge, scrotal pain or swelling. He states he notices a pain in his perineal area when he sits and he does endorse mild low back pain across his lower back. Patient endorsing cough for past week productive of thick white sputum as well. The patient is sexually active with one long term partner and does not use condoms. He denies history of STI. The patient states he drinks 1 pint of alcohol most days for the past 10-20 years. His last drink was 4 days " ago.    In the ED the patient was hypotensive, tachyonic, and tachycardic. Troponin was elevated and lactate was 3.4. The patient denied chest pain. He was given rocephin and 3L fluid resuscitation. Critical care was consulted for possible septic shock. The patient's initial blood pressure was 90/50 with a pulse rate of 152 and he was hypoxic (satting in the 80's). Patient's vitals improved with fluid resuscitation and supplemental oxygen. Lactate trended down to 1.6. Antibiotics were broadened to vancomycin and zosyn and C.diff was sent. Patient determined stable for floor and patient was admitted hospital medicine for further treatment of severe sepsis.      Hospital Course:  Brief HPI  Patient came to the ED 1/12 with urinary symptoms and fatigue. He was treated for prostatitis with Cipro (has taken 2 doses). This morning he awoke feeling more fatigued, short of breath, and dizzy described as near syncopal. Endorses fever, chills, and rigors.  Denies URI symptoms, chest pain, vomiting, abdominal pain, gross hematuria, melena or hematochezia. No prior abdominal surgeries. No known sick contacts or recent travel. Has mild low back pain across his back. His brother brings him to the ED for further evaluation. In the ED today (1/13) patient had hypotension, tachypnea and hypokalemia.  He additionally had an elevated troponin. ICU was initially consulted for sepsis and hypotension.        Mr. Friedman given IV fluid boluses to treat hypotension and shortly after developed some respiratory distress requiring application of BiPAP and a subsequent second critical care consult. Simultaneously he was given lasix. Approximately an hour later, he has appropriately urinated in response to the lasix and no longer requires BIPAP. His oxygenation was weaned down to facemask and he denies shortness of breath. Because he is returning to baseline, admission to MICU no longer necessary.     Patient improved significantly with  addition of lasix, oxygen requirements reduced to 4lpm NC.  Plan for abdo CT w/ contrast to look for possible abscess.  Daily blood cultures, growing gram pos cocci and gram neg rods.        Interval History:   Patient has improved significantly with addition of lasix (discontinued), currently on RA sating at 94%.  No abscess has identified on CT A/P. Will continue on Vanc and Zosyn. Patient is complaining of dysphagia, MBSS (01/17) is normal. Patient still complaining of testicular and perineal pain, scrotal US showed R hydrocele and L varicocele. Urology was consulted, appreciate their recs.    Review of Systems   Constitutional: Negative for diaphoresis and fatigue.   HENT: Negative for sore throat and trouble swallowing.    Respiratory: Negative for cough, chest tightness, shortness of breath and wheezing.    Cardiovascular: Negative for chest pain, palpitations and leg swelling.   Gastrointestinal: Negative for nausea and vomiting.   Genitourinary: Negative for dysuria and hematuria.   Neurological: Positive for weakness. Negative for headaches.     Objective:     Vital Signs (Most Recent):  Temp: 98.2 °F (36.8 °C) (01/18/19 1514)  Pulse: 81 (01/18/19 1514)  Resp: 20 (01/18/19 1514)  BP: (!) 119/57 (01/18/19 1514)  SpO2: (!) 91 % (01/18/19 1514) Vital Signs (24h Range):  Temp:  [97.9 °F (36.6 °C)-99.9 °F (37.7 °C)] 98.2 °F (36.8 °C)  Pulse:  [68-82] 81  Resp:  [18-20] 20  SpO2:  [91 %-95 %] 91 %  BP: (103-137)/(57-65) 119/57     Weight: 84.4 kg (186 lb 1.1 oz)  Body mass index is 27.48 kg/m².    Intake/Output Summary (Last 24 hours) at 1/18/2019 1522  Last data filed at 1/18/2019 1100  Gross per 24 hour   Intake 1250 ml   Output 2275 ml   Net -1025 ml      Physical Exam   Constitutional: He is oriented to person, place, and time. Vital signs are normal. He appears well-developed. He is cooperative.   HENT:   Head: Normocephalic and atraumatic.   Eyes: Conjunctivae are normal. Pupils are equal, round, and  reactive to light. Right eye exhibits no exudate. Left eye exhibits no exudate. Right conjunctiva has no hemorrhage. Left conjunctiva has no hemorrhage. Right eye exhibits no nystagmus. Left eye exhibits no nystagmus.   Neck: Trachea normal. No neck rigidity. No tracheal deviation present.   Cardiovascular: Normal rate and regular rhythm. Exam reveals no gallop.   No murmur heard.  Pulmonary/Chest: Effort normal. No accessory muscle usage. No tachypnea. No respiratory distress. He has no wheezes. He has no rhonchi. He has no rales.   Abdominal: Soft. Normal appearance and bowel sounds are normal. There is no tenderness.   Genitourinary:   Genitourinary Comments: Perineal pain when he sits up, improved when he walks. No testicular pain or tenderness. No open wounds or lesions at groin area.   Musculoskeletal: Normal range of motion.   Neurological: He is alert and oriented to person, place, and time. No cranial nerve deficit. GCS eye subscore is 4. GCS verbal subscore is 5. GCS motor subscore is 6.   Skin: Skin is warm and dry. Capillary refill takes 2 to 3 seconds. He is not diaphoretic. No cyanosis. Nails show no clubbing.   Psychiatric: He has a normal mood and affect. His speech is normal and behavior is normal.   Nursing note and vitals reviewed.      Significant Labs:   ABGs:   No results for input(s): PH, PCO2, HCO3, POCSATURATED, BE, TOTALHB, COHB, METHB, O2HB, POCFIO2 in the last 48 hours.  CBC:   Recent Labs   Lab 01/17/19  0630 01/18/19  0609   WBC 9.63 12.24   HGB 13.3* 12.6*   HCT 40.3 38.2*    231     CMP:   Recent Labs   Lab 01/17/19  0630 01/18/19  0609    142   K 3.6 3.7    108   CO2 26 27   GLU 75 83   BUN 27* 17   CREATININE 1.1 1.0   CALCIUM 8.9 8.5*   PROT 6.6 6.5   ALBUMIN 2.5* 2.5*   BILITOT 0.6 0.7   ALKPHOS 82 97   AST 13 18   ALT 13 16   ANIONGAP 9 7*   EGFRNONAA >60.0 >60.0     Magnesium:   Recent Labs   Lab 01/18/19  0609   MG 2.0     Urine Culture:   No results for  input(s): LABURIN in the last 48 hours.  Urine Studies:   No results for input(s): COLORU, APPEARANCEUA, PHUR, SPECGRAV, PROTEINUA, GLUCUA, KETONESU, BILIRUBINUA, OCCULTUA, NITRITE, UROBILINOGEN, LEUKOCYTESUR, RBCUA, WBCUA, BACTERIA, SQUAMEPITHEL, HYALINECASTS in the last 48 hours.    Invalid input(s): WRIGHTSUR    Significant Imagin/15 CT ABDO-pelvis w/ contrast  1. No acute intra-abdominal abnormality identified.  2. Cholelithiasis.  3. Additional findings as above.    1/15 US Scrotum and testicles  No acute testicular pathology.  Right hydrocele and left varicocele present.  Small scrotal alejandro.  Small left epididymal head cysts.     CXR  The heart is not enlarged ill-defined patchy airspace disease and/or edema noted at the lung bases.  Upper lung fields are clear.  No pleural effusion.     CT abdo non-con  1. No acute intra-abdominal/pelvic CT abnormalities to account for the reported history of sepsis.  2. Right basilar subsegmental atelectasis or consolidation with trace associated dependent pleural fluid.  3. Cholelithiasis.  4. Subcentimeter hypodensity within the right hepatic dome, too small to accurately characterize.    Assessment/Plan:      * Sepsis    Patient with recently diagnosed UTI, started on outpatient antibiotics the day prior to admission. He has been endorsing significant diarrhea for the past 4 days as well as decreased PO intake. On admission found to be febrile, tachycardic, and hypotensive. Lactate elevated at 3.4. Sepsis protocol initiated, given rocephin and IVF resuscitation, eventually broadened out to vancomycin and zosyn. Patient was hypoxic, requiring 3L nasal cannula. Patient with perineal tenderness, no obvious sign of Vicky's or cellulitis. No testicular swelling or tenderness.    Unclear source. CXR negative for consolidation. Patient with reproducible perineal pain however no overlying cellulitis or crepitus. Blood culture revealed gram positive cocci and  "gram negative rods.    Plan:  - ID consulted for staph bacteremia, appreciate their recs.  - Patient has oral herpes, 2 doses of Valacyclovir were given (last dose on 01/18).  - CT abdomen pelvis and Scrotal ultrasound did not show any clear source of infection  - Continue antibiotics vanc and zosyn for now.  - C.diff negative  - Replete electrolytes as needed     Perineal pain    - Patient has 8/10 sharp pain which is getting worse by sitting up and improve with walking.  - Started on Ibuprofen 600 mg TID prn.  - Scrotal US showed R hydrocele and L varicocele.  - Urology was consulted, appreciate their recs.     Acute respiratory failure with hypoxia    - Patient with sepsis not on home oxygen but requiring 3L nasal cannula in ED. CXR showing no sign of intrapulmonary pathology. Well's score is 0. Patient not complaining of chest pain.    - Patient developed severe pulmonary edema after initiation of sepsis ordered fluids (~3L)  - Improved significantly with diuresis, lasix 40mg BID IV (discontinued)  - currently on RA sating at 94%     Dysphagia    - Patient is complaining of difficulty swallowing to both solids and liquids.  - SLP was consulted for evaluation.  - MBSS on 01/17 showed no abnormalities.       Discharge planning issues    Patient lives at home with another family member, able to complete ADL's without difficulty    - PT/OT consulted  - Social work consulted       Acute cystitis without hematuria    Patient diagnosed with UTI in ED on 1/12/19    - Urinalysis notable for WBCs and bacteria. Prescribed Cipro, patient took two doses, and reported improvement in perineal pain with medication  - Now presenting with sepsis, likely combined with dehydration 2/2 concomitant diarrheal illness, urine culture is negative  - Now on broad spectrum antibiotics  - see "sepsis"     Essential hypertension    Patient diagnosed with hypertension within the past year. Patient states he is on an antihypertensive at home " but is unclear of the name. No documentation in chart as to what patient is taking. Patient states he has not taken the medication for several days and often forgets dose.    - given hypotension requiring IVF resuscitation at time of admission, will continue holding any antihypertensives         VTE Risk Mitigation (From admission, onward)        Ordered     enoxaparin injection 40 mg  Daily      01/15/19 1155     Place sequential compression device  Until discontinued      01/13/19 2327     IP VTE LOW RISK PATIENT  Once      01/13/19 9151              Chuck Gramajo MD  Department of Hospital Medicine   [facility]

## 2019-01-18 NOTE — PT/OT/SLP PROGRESS
Speech Language Pathology Treatment and Discharge Summary    Patient Name:  Brian Friedman   MRN:  [id number]  Admitting Diagnosis: Sepsis    Recommendations:                 General Recommendations:  Follow-up not indicated  Diet recommendations:  Regular, Liquid Diet Level: Thin   Aspiration Precautions: Double swallow with each sip, No straws and Small cup sips   General Precautions: Standard, fall  Communication strategies:  none    Subjective     Pt awake;alert. Seated upright on EOB    Pain/Comfort:  · Pain Rating 1: 0/10    Objective:     Has the patient been evaluated by SLP for swallowing?   Yes  Keep patient NPO? No   Current Respiratory Status: room air      Patient recalled MBSS procedure completed previous day. S/p min education, patient independently demonstrated small cup sips and double swallows over thin liquid trials with no overt signs of airway compromise.  Regular solid trial also tolerated with no overt signs of airway compromise. Skilled education was provided to patient re: importance of double swallows and no straws for airway protection. Patient verbalized understanding and is in agreement with plan of care. RN updated.   Whiteboard updated.     Assessment:     Brian Friedman is a 68 y.o. male with an SLP diagnosis of mild Dysphagia though eliminated with small cup sips/double swallow. Patient appearing appropriate for discharge at this time.     Goals:   Multidisciplinary Problems     SLP Goals     Not on file          Multidisciplinary Problems (Resolved)        Problem: SLP Goal    Goal Priority Disciplines Outcome   SLP Goal   (Resolved)     SLP Outcome(s) achieved   Description:  Speech Language Pathology Goals  Goals expected to be met by 1/23    1. Pt will tolerate a regular solid diet and thin liquids with no overt signs of airway compromise.  2. Pt will participate in a dysphagia ex x10 with SLP model   3. Pt will demonstrate independence with feeding and swallowing compensatory  strategies                                 Plan:     · Patient to be seen:  3 x/week   · Plan of Care expires:  02/16/19  · Plan of Care reviewed with:  patient   · SLP Follow-Up:  No       Discharge recommendations:  other (see comments)   Barriers to Discharge:  None    Time Tracking:     SLP Treatment Date:   01/18/19  Speech Start Time:  1111  Speech Stop Time:  1127     Speech Total Time (min):  16 min    Billable Minutes: Treatment Swallowing Dysfunction 8 and Seld Care/Home Management Training 8    Emily Abadie, CCC-SLP  01/18/2019

## 2019-01-18 NOTE — SUBJECTIVE & OBJECTIVE
Interval History:   Patient has improved significantly with addition of lasix (discontinued), currently on RA sating at 94%.  No abscess has identified on CT A/P. Will continue on Vanc and Zosyn. Patient is complaining of dysphagia, MBSS (01/17) is normal. Patient still complaining of testicular and perineal pain, scrotal US showed R hydrocele and L varicocele. Urology was consulted, appreciate their recs.    Review of Systems   Constitutional: Negative for diaphoresis and fatigue.   HENT: Negative for sore throat and trouble swallowing.    Respiratory: Negative for cough, chest tightness, shortness of breath and wheezing.    Cardiovascular: Negative for chest pain, palpitations and leg swelling.   Gastrointestinal: Negative for nausea and vomiting.   Genitourinary: Negative for dysuria and hematuria.   Neurological: Positive for weakness. Negative for headaches.     Objective:     Vital Signs (Most Recent):  Temp: 98.2 °F (36.8 °C) (01/18/19 1514)  Pulse: 81 (01/18/19 1514)  Resp: 20 (01/18/19 1514)  BP: (!) 119/57 (01/18/19 1514)  SpO2: (!) 91 % (01/18/19 1514) Vital Signs (24h Range):  Temp:  [97.9 °F (36.6 °C)-99.9 °F (37.7 °C)] 98.2 °F (36.8 °C)  Pulse:  [68-82] 81  Resp:  [18-20] 20  SpO2:  [91 %-95 %] 91 %  BP: (103-137)/(57-65) 119/57     Weight: 84.4 kg (186 lb 1.1 oz)  Body mass index is 27.48 kg/m².    Intake/Output Summary (Last 24 hours) at 1/18/2019 1522  Last data filed at 1/18/2019 1100  Gross per 24 hour   Intake 1250 ml   Output 2275 ml   Net -1025 ml      Physical Exam   Constitutional: He is oriented to person, place, and time. Vital signs are normal. He appears well-developed. He is cooperative.   HENT:   Head: Normocephalic and atraumatic.   Eyes: Conjunctivae are normal. Pupils are equal, round, and reactive to light. Right eye exhibits no exudate. Left eye exhibits no exudate. Right conjunctiva has no hemorrhage. Left conjunctiva has no hemorrhage. Right eye exhibits no nystagmus. Left eye  exhibits no nystagmus.   Neck: Trachea normal. No neck rigidity. No tracheal deviation present.   Cardiovascular: Normal rate and regular rhythm. Exam reveals no gallop.   No murmur heard.  Pulmonary/Chest: Effort normal. No accessory muscle usage. No tachypnea. No respiratory distress. He has no wheezes. He has no rhonchi. He has no rales.   Abdominal: Soft. Normal appearance and bowel sounds are normal. There is no tenderness.   Genitourinary:   Genitourinary Comments: Perineal pain when he sits up, improved when he walks. No testicular pain or tenderness. No open wounds or lesions at groin area.   Musculoskeletal: Normal range of motion.   Neurological: He is alert and oriented to person, place, and time. No cranial nerve deficit. GCS eye subscore is 4. GCS verbal subscore is 5. GCS motor subscore is 6.   Skin: Skin is warm and dry. Capillary refill takes 2 to 3 seconds. He is not diaphoretic. No cyanosis. Nails show no clubbing.   Psychiatric: He has a normal mood and affect. His speech is normal and behavior is normal.   Nursing note and vitals reviewed.      Significant Labs:   ABGs:   No results for input(s): PH, PCO2, HCO3, POCSATURATED, BE, TOTALHB, COHB, METHB, O2HB, POCFIO2 in the last 48 hours.  CBC:   Recent Labs   Lab 01/17/19  0630 01/18/19  0609   WBC 9.63 12.24   HGB 13.3* 12.6*   HCT 40.3 38.2*    231     CMP:   Recent Labs   Lab 01/17/19  0630 01/18/19  0609    142   K 3.6 3.7    108   CO2 26 27   GLU 75 83   BUN 27* 17   CREATININE 1.1 1.0   CALCIUM 8.9 8.5*   PROT 6.6 6.5   ALBUMIN 2.5* 2.5*   BILITOT 0.6 0.7   ALKPHOS 82 97   AST 13 18   ALT 13 16   ANIONGAP 9 7*   EGFRNONAA >60.0 >60.0     Magnesium:   Recent Labs   Lab 01/18/19  0609   MG 2.0     Urine Culture:   No results for input(s): LABURIN in the last 48 hours.  Urine Studies:   No results for input(s): COLORU, APPEARANCEUA, PHUR, SPECGRAV, PROTEINUA, GLUCUA, KETONESU, BILIRUBINUA, OCCULTUA, NITRITE, UROBILINOGEN,  LEUKOCYTESUR, RBCUA, WBCUA, BACTERIA, SQUAMEPITHEL, HYALINECASTS in the last 48 hours.    Invalid input(s): WRIGHTSUR    Significant Imagin/15 CT ABDO-pelvis w/ contrast  1. No acute intra-abdominal abnormality identified.  2. Cholelithiasis.  3. Additional findings as above.    1/15 US Scrotum and testicles  No acute testicular pathology.  Right hydrocele and left varicocele present.  Small scrotal alejandro.  Small left epididymal head cysts.     CXR  The heart is not enlarged ill-defined patchy airspace disease and/or edema noted at the lung bases.  Upper lung fields are clear.  No pleural effusion.     CT abdo non-con  1. No acute intra-abdominal/pelvic CT abnormalities to account for the reported history of sepsis.  2. Right basilar subsegmental atelectasis or consolidation with trace associated dependent pleural fluid.  3. Cholelithiasis.  4. Subcentimeter hypodensity within the right hepatic dome, too small to accurately characterize.

## 2019-01-18 NOTE — PROGRESS NOTES
[facility]  Infectious Disease  Progress Note    Patient Name: Brian Friedman  MRN: [id number]  Admission Date: 1/13/2019  Length of Stay: 5 days  Attending Physician: Omega Lea MD  Primary Care Provider: Primary Doctor No    Isolation Status: No active isolations  Assessment/Plan:      * Sepsis    68 year old male presents with fevers, chills, rigors, diarrhea, dizziness, and perineal pain and found to be septic in the ER as he was febrile, tachycardic, hypotensive, hypoxic, with lactic acid of 3.4 and leukocytosis; CT abdomen/pelvis negative; ultrasound of scrotum negative; no signs of cellulitis on exam. pt started on empiric antibiotics and has improved; afebrile and WBC count normalized.    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 Patient with persistent scrotal/perineal pain today- concerning for epididymitis, however, ultrasound negative. GC/chlamydia negative.     Plan:  1. F/u on urology recs  2. D/c vancomycin and zosyn; start rocephin 2 gram IV q 24 hours and flagyl 500 mg PO TID   3. Anticipate at least 2 weeks of antibiotic treatment given bacteremia  4. Will follow         Thank you for your consult. I will follow-up with patient. Please contact us if you have any additional questions.  RASHID Morrison Pager: [phone]    Infectious Disease  [facility]    Subjective:     Principal Problem:Sepsis    HPI: Mr. Friedman is a 68 year old male with a history of HTN who presented to [facility] ED for evaluation of dizziness. The patient had been seen the previous  "day (1/12/19) in the ED complaining of dysuria and "brown" colored urine. He endorsed testicular pain that had been present for roughly 1 week. At that time urinalysis showed WBC's and occasional bacteria, cultures negative. He was started on cipro for presumptive prostatitis and discharged home. The patient had two doses of cipro total at home before representing to the ED. A this time he was endorsing dizziness, fatigue, and near syncope. He endorsed fevers, chills, rigors, and diarrhea at home. The patient states that he had been having diarrhea for roughly 4 days or so prior to presenting He denies nausea, vomiting, or abdominal pain. He denies sick contacts. At this time the patient denies dysuria, hematuria, pyuria, penial discharge, scrotal pain or swelling. He states he notices a pain in his perineal area when he sits and he does endorse mild low back pain across his lower back. Patient endorsing cough for past week productive of thick white sputum as well. The patient is sexually active with one long term partner and does not use condoms. He denies history of STI.      In the ED the patient was hypotensive, tachyonic, and tachycardic. Troponin was elevated and lactate was 3.4. The patient denied chest pain. He was given rocephin and 3L fluid resuscitation. Critical care was consulted for possible septic shock. The patient's initial blood pressure was 90/50 with a pulse rate of 152 and he was hypoxic (satting in the 80's). Patient's vitals improved with fluid resuscitation and supplemental oxygen. Lactate trended down to 1.6. Antibiotics were broadened to vancomycin and zosyn and C.diff was sent.    Patient denies foreign travel. Had no pets at home. No longer works. Has mainly indoor hobbies. Denies penile discharge. States that the pain underneath his scrotum is still present but has improved. Diarrhea still present.   Interval History: pt afebrile. Still with scrotal pain today. Awaiting urology recs. " Blood cultures positive for prevotella and staph epi.     Review of Systems   Constitutional: Negative for chills, diaphoresis, fatigue and fever.   HENT:        (+) fever blisters   Respiratory: Negative for cough and shortness of breath.    Cardiovascular: Negative for chest pain and leg swelling.   Gastrointestinal: Positive for diarrhea (improved). Negative for abdominal pain, constipation, nausea and vomiting.   Genitourinary: Positive for flank pain, scrotal swelling and testicular pain. Negative for difficulty urinating, dysuria, frequency, hematuria and urgency.   Musculoskeletal: Positive for back pain. Negative for myalgias.   Skin: Negative for rash and wound.   Neurological: Negative for dizziness, light-headedness and headaches.   Psychiatric/Behavioral: Negative for agitation and behavioral problems. The patient is not nervous/anxious.      Objective:     Vital Signs (Most Recent):  Temp: 99.9 °F (37.7 °C) (01/18/19 1106)  Pulse: 75 (01/18/19 1147)  Resp: 20 (01/18/19 1106)  BP: (!) 103/58 (01/18/19 1106)  SpO2: (!) 94 % (01/18/19 1106) Vital Signs (24h Range):  Temp:  [97.9 °F (36.6 °C)-99.9 °F (37.7 °C)] 99.9 °F (37.7 °C)  Pulse:  [68-82] 75  Resp:  [18-20] 20  SpO2:  [92 %-95 %] 94 %  BP: (103-137)/(58-65) 103/58     Weight: 84.4 kg (186 lb 1.1 oz)  Body mass index is 27.48 kg/m².    Estimated Creatinine Clearance: 72.7 mL/min (based on SCr of 1 mg/dL).    Physical Exam   Constitutional: He is oriented to person, place, and time. He appears well-developed and well-nourished. No distress.   HENT:   Fever blisters noted to bottom lip   Cardiovascular: Normal rate and regular rhythm.   No murmur heard.  Pulmonary/Chest: Effort normal and breath sounds normal. No respiratory distress.   Abdominal: Soft. Bowel sounds are normal. He exhibits no distension.   Genitourinary:   Genitourinary Comments:  exam deferred today   Musculoskeletal: Normal range of motion. He exhibits no edema.   Neurological: He  is alert and oriented to person, place, and time.   Skin: Skin is warm and dry.   Psychiatric: He has a normal mood and affect. His behavior is normal.       Significant Labs:   Blood Culture:   Recent Labs   Lab 01/13/19  1620 01/13/19  1627 01/15/19  1202 01/16/19  0416 01/17/19  0630   LABBLOO Gram stain odin bottle: Gram positive cocci in clusters resembling Staph  Gram stain odin bottle: Gram positive rods  Gram stain odin bottle: Gram negative rods   Results called to and read back by: Cecilia Wolff RN  01/15/2019    05:27  PREVOTELLA (B.) BIVIA Gram stain odin bottle: Gram positive cocci in clusters resembling Staph  Results called to and read back by:Sandi Bill RN  01/14/2019  13:42  STAPHYLOCOCCUS EPIDERMIDIS No Growth to date  No Growth to date  No Growth to date  No Growth to date  No Growth to date  No Growth to date No Growth to date  No Growth to date  No Growth to date  No Growth to date  No Growth to date  No Growth to date No Growth to date  No Growth to date     CBC:   Recent Labs   Lab 01/17/19  0630 01/18/19  0609   WBC 9.63 12.24   HGB 13.3* 12.6*   HCT 40.3 38.2*    231     CMP:   Recent Labs   Lab 01/17/19  0630 01/18/19  0609    142   K 3.6 3.7    108   CO2 26 27   GLU 75 83   BUN 27* 17   CREATININE 1.1 1.0   CALCIUM 8.9 8.5*   PROT 6.6 6.5   ALBUMIN 2.5* 2.5*   BILITOT 0.6 0.7   ALKPHOS 82 97   AST 13 18   ALT 13 16   ANIONGAP 9 7*   EGFRNONAA >60.0 >60.0     Urine Culture:   Recent Labs   Lab 01/12/19  2150 01/13/19 1938   LABURIN No growth No growth     Urine Studies:   Recent Labs   Lab 01/13/19 1938   COLORU Yellow   APPEARANCEUA Hazy*   PHUR 5.0   SPECGRAV 1.010   PROTEINUA Negative   GLUCUA Negative   KETONESU Negative   BILIRUBINUA Negative   OCCULTUA 1+*   NITRITE Negative   LEUKOCYTESUR 1+*   RBCUA 7*   WBCUA 17*   BACTERIA Rare   SQUAMEPITHEL 4     Wound Culture: No results for input(s): LABAERO in the last 4320 hours.    Significant  Imaging: I have reviewed all pertinent imaging results/findings within the past 24 hours.

## 2019-01-18 NOTE — ASSESSMENT & PLAN NOTE
"Patient diagnosed with UTI in ED on 1/12/19    - Urinalysis notable for WBCs and bacteria. Prescribed Cipro, patient took two doses, and reported improvement in perineal pain with medication  - Now presenting with sepsis, likely combined with dehydration 2/2 concomitant diarrheal illness, urine culture is negative  - Now on broad spectrum antibiotics  - see "sepsis"  "

## 2019-01-18 NOTE — ASSESSMENT & PLAN NOTE
Patient with recently diagnosed UTI, started on outpatient antibiotics the day prior to admission. He has been endorsing significant diarrhea for the past 4 days as well as decreased PO intake. On admission found to be febrile, tachycardic, and hypotensive. Lactate elevated at 3.4. Sepsis protocol initiated, given rocephin and IVF resuscitation, eventually broadened out to vancomycin and zosyn. Patient was hypoxic, requiring 3L nasal cannula. Patient with perineal tenderness, no obvious sign of Vicky's or cellulitis. No testicular swelling or tenderness.    Unclear source. CXR negative for consolidation. Patient with reproducible perineal pain however no overlying cellulitis or crepitus. Blood culture revealed gram positive cocci and gram negative rods.    Plan:  - ID consulted for staph bacteremia, appreciate their recs.  - Patient has oral herpes, 2 doses of Valacyclovir were given (last dose on 01/18).  - CT abdomen pelvis and Scrotal ultrasound did not show any clear source of infection  - Continue antibiotics vanc and zosyn for now.  - C.diff negative  - Replete electrolytes as needed

## 2019-01-18 NOTE — PLAN OF CARE
Problem: Adult Inpatient Plan of Care  Goal: Plan of Care Review  Outcome: Ongoing (interventions implemented as appropriate)  Pt A&O x 4.  Afebrile. VSS on RA. Pt c/o pain to perineum, below scrotum, 8/10.  Given PRN tylenol, moderate relief obtained. IV antibiotics given.  Intake and output recorded.  Blood glucose monitoring maintained.

## 2019-01-18 NOTE — SUBJECTIVE & OBJECTIVE
Interval History: pt afebrile. Still with scrotal pain today. Awaiting urology recs. Blood cultures positive for prevotella and staph epi.     Review of Systems   Constitutional: Negative for chills, diaphoresis, fatigue and fever.   HENT:        (+) fever blisters   Respiratory: Negative for cough and shortness of breath.    Cardiovascular: Negative for chest pain and leg swelling.   Gastrointestinal: Positive for diarrhea (improved). Negative for abdominal pain, constipation, nausea and vomiting.   Genitourinary: Positive for flank pain, scrotal swelling and testicular pain. Negative for difficulty urinating, dysuria, frequency, hematuria and urgency.   Musculoskeletal: Positive for back pain. Negative for myalgias.   Skin: Negative for rash and wound.   Neurological: Negative for dizziness, light-headedness and headaches.   Psychiatric/Behavioral: Negative for agitation and behavioral problems. The patient is not nervous/anxious.      Objective:     Vital Signs (Most Recent):  Temp: 99.9 °F (37.7 °C) (01/18/19 1106)  Pulse: 75 (01/18/19 1147)  Resp: 20 (01/18/19 1106)  BP: (!) 103/58 (01/18/19 1106)  SpO2: (!) 94 % (01/18/19 1106) Vital Signs (24h Range):  Temp:  [97.9 °F (36.6 °C)-99.9 °F (37.7 °C)] 99.9 °F (37.7 °C)  Pulse:  [68-82] 75  Resp:  [18-20] 20  SpO2:  [92 %-95 %] 94 %  BP: (103-137)/(58-65) 103/58     Weight: 84.4 kg (186 lb 1.1 oz)  Body mass index is 27.48 kg/m².    Estimated Creatinine Clearance: 72.7 mL/min (based on SCr of 1 mg/dL).    Physical Exam   Constitutional: He is oriented to person, place, and time. He appears well-developed and well-nourished. No distress.   HENT:   Fever blisters noted to bottom lip   Cardiovascular: Normal rate and regular rhythm.   No murmur heard.  Pulmonary/Chest: Effort normal and breath sounds normal. No respiratory distress.   Abdominal: Soft. Bowel sounds are normal. He exhibits no distension.   Genitourinary:   Genitourinary Comments:  exam deferred today    Musculoskeletal: Normal range of motion. He exhibits no edema.   Neurological: He is alert and oriented to person, place, and time.   Skin: Skin is warm and dry.   Psychiatric: He has a normal mood and affect. His behavior is normal.       Significant Labs:   Blood Culture:   Recent Labs   Lab 01/13/19  1620 01/13/19  1627 01/15/19  1202 01/16/19  0416 01/17/19  0630   LABBLOO Gram stain odin bottle: Gram positive cocci in clusters resembling Staph  Gram stain odin bottle: Gram positive rods  Gram stain odin bottle: Gram negative rods   Results called to and read back by: Cecilia Wolff RN  01/15/2019    05:27  PREVOTELLA (B.) BIVIA Gram stain odin bottle: Gram positive cocci in clusters resembling Staph  Results called to and read back by:Sandi Bill RN  01/14/2019  13:42  STAPHYLOCOCCUS EPIDERMIDIS No Growth to date  No Growth to date  No Growth to date  No Growth to date  No Growth to date  No Growth to date No Growth to date  No Growth to date  No Growth to date  No Growth to date  No Growth to date  No Growth to date No Growth to date  No Growth to date     CBC:   Recent Labs   Lab 01/17/19  0630 01/18/19  0609   WBC 9.63 12.24   HGB 13.3* 12.6*   HCT 40.3 38.2*    231     CMP:   Recent Labs   Lab 01/17/19  0630 01/18/19  0609    142   K 3.6 3.7    108   CO2 26 27   GLU 75 83   BUN 27* 17   CREATININE 1.1 1.0   CALCIUM 8.9 8.5*   PROT 6.6 6.5   ALBUMIN 2.5* 2.5*   BILITOT 0.6 0.7   ALKPHOS 82 97   AST 13 18   ALT 13 16   ANIONGAP 9 7*   EGFRNONAA >60.0 >60.0     Urine Culture:   Recent Labs   Lab 01/12/19  2150 01/13/19  1938   LABURIN No growth No growth     Urine Studies:   Recent Labs   Lab 01/13/19 1938   COLORU Yellow   APPEARANCEUA Hazy*   PHUR 5.0   SPECGRAV 1.010   PROTEINUA Negative   GLUCUA Negative   KETONESU Negative   BILIRUBINUA Negative   OCCULTUA 1+*   NITRITE Negative   LEUKOCYTESUR 1+*   RBCUA 7*   WBCUA 17*   BACTERIA Rare   SQUAMEPITHEL 4     Wound  Culture: No results for input(s): LABAERO in the last 4320 hours.    Significant Imaging: I have reviewed all pertinent imaging results/findings within the past 24 hours.

## 2019-01-19 LAB
ANION GAP SERPL CALC-SCNC: 7 MMOL/L
BACTERIA STL CULT: NORMAL
BASOPHILS # BLD AUTO: 0.02 K/UL
BASOPHILS NFR BLD: 0.1 %
BUN SERPL-MCNC: 12 MG/DL
CALCIUM SERPL-MCNC: 8.5 MG/DL
CHLORIDE SERPL-SCNC: 107 MMOL/L
CO2 SERPL-SCNC: 23 MMOL/L
CREAT SERPL-MCNC: 0.8 MG/DL
DIFFERENTIAL METHOD: ABNORMAL
EOSINOPHIL # BLD AUTO: 0.1 K/UL
EOSINOPHIL NFR BLD: 0.8 %
ERYTHROCYTE [DISTWIDTH] IN BLOOD BY AUTOMATED COUNT: 12.6 %
EST. GFR  (AFRICAN AMERICAN): >60 ML/MIN/1.73 M^2
EST. GFR  (NON AFRICAN AMERICAN): >60 ML/MIN/1.73 M^2
GLUCOSE SERPL-MCNC: 92 MG/DL
HCT VFR BLD AUTO: 36.9 %
HGB BLD-MCNC: 12.4 G/DL
IMM GRANULOCYTES # BLD AUTO: 0.15 K/UL
IMM GRANULOCYTES NFR BLD AUTO: 1 %
LYMPHOCYTES # BLD AUTO: 2.2 K/UL
LYMPHOCYTES NFR BLD: 15.5 %
MCH RBC QN AUTO: 33.4 PG
MCHC RBC AUTO-ENTMCNC: 33.6 G/DL
MCV RBC AUTO: 100 FL
MONOCYTES # BLD AUTO: 1.1 K/UL
MONOCYTES NFR BLD: 7.7 %
NEUTROPHILS # BLD AUTO: 10.8 K/UL
NEUTROPHILS NFR BLD: 74.9 %
NRBC BLD-RTO: 0 /100 WBC
PLATELET # BLD AUTO: 295 K/UL
PMV BLD AUTO: 9.9 FL
POCT GLUCOSE: 106 MG/DL (ref 70–110)
POCT GLUCOSE: 113 MG/DL (ref 70–110)
POCT GLUCOSE: 128 MG/DL (ref 70–110)
POCT GLUCOSE: 93 MG/DL (ref 70–110)
POTASSIUM SERPL-SCNC: 3.5 MMOL/L
RBC # BLD AUTO: 3.71 M/UL
SODIUM SERPL-SCNC: 137 MMOL/L
WBC # BLD AUTO: 14.46 K/UL

## 2019-01-19 PROCEDURE — 76937 US GUIDE VASCULAR ACCESS: CPT

## 2019-01-19 PROCEDURE — 36415 COLL VENOUS BLD VENIPUNCTURE: CPT

## 2019-01-19 PROCEDURE — 25000003 PHARM REV CODE 250: Performed by: STUDENT IN AN ORGANIZED HEALTH CARE EDUCATION/TRAINING PROGRAM

## 2019-01-19 PROCEDURE — 99232 SBSQ HOSP IP/OBS MODERATE 35: CPT | Mod: GC,,, | Performed by: HOSPITALIST

## 2019-01-19 PROCEDURE — 99232 PR SUBSEQUENT HOSPITAL CARE,LEVL II: ICD-10-PCS | Mod: GC,,, | Performed by: HOSPITALIST

## 2019-01-19 PROCEDURE — 11000001 HC ACUTE MED/SURG PRIVATE ROOM

## 2019-01-19 PROCEDURE — 25000003 PHARM REV CODE 250: Performed by: PHYSICIAN ASSISTANT

## 2019-01-19 PROCEDURE — 80048 BASIC METABOLIC PNL TOTAL CA: CPT

## 2019-01-19 PROCEDURE — 36410 VNPNXR 3YR/> PHY/QHP DX/THER: CPT

## 2019-01-19 PROCEDURE — 63600175 PHARM REV CODE 636 W HCPCS: Performed by: STUDENT IN AN ORGANIZED HEALTH CARE EDUCATION/TRAINING PROGRAM

## 2019-01-19 PROCEDURE — C1751 CATH, INF, PER/CENT/MIDLINE: HCPCS

## 2019-01-19 PROCEDURE — 63600175 PHARM REV CODE 636 W HCPCS: Performed by: PHYSICIAN ASSISTANT

## 2019-01-19 PROCEDURE — S4991 NICOTINE PATCH NONLEGEND: HCPCS | Performed by: STUDENT IN AN ORGANIZED HEALTH CARE EDUCATION/TRAINING PROGRAM

## 2019-01-19 PROCEDURE — 85025 COMPLETE CBC W/AUTO DIFF WBC: CPT

## 2019-01-19 RX ORDER — METRONIDAZOLE 500 MG/1
500 TABLET ORAL EVERY 8 HOURS
Qty: 42 TABLET | Refills: 0 | Status: SHIPPED | OUTPATIENT
Start: 2019-01-19 | End: 2019-02-03

## 2019-01-19 RX ORDER — IBUPROFEN 600 MG/1
600 TABLET ORAL 2 TIMES DAILY
Refills: 0 | COMMUNITY
Start: 2019-01-19 | End: 2019-01-26

## 2019-01-19 RX ORDER — IBUPROFEN 600 MG/1
600 TABLET ORAL 3 TIMES DAILY PRN
Refills: 0 | COMMUNITY
Start: 2019-01-19 | End: 2019-01-19

## 2019-01-19 RX ORDER — POTASSIUM CHLORIDE 750 MG/1
50 CAPSULE, EXTENDED RELEASE ORAL ONCE
Status: COMPLETED | OUTPATIENT
Start: 2019-01-19 | End: 2019-01-19

## 2019-01-19 RX ADMIN — METRONIDAZOLE 500 MG: 500 TABLET ORAL at 02:01

## 2019-01-19 RX ADMIN — NICOTINE 1 PATCH: 21 PATCH, EXTENDED RELEASE TRANSDERMAL at 09:01

## 2019-01-19 RX ADMIN — CEFTRIAXONE 2 G: 2 INJECTION, SOLUTION INTRAVENOUS at 05:01

## 2019-01-19 RX ADMIN — METRONIDAZOLE 500 MG: 500 TABLET ORAL at 05:01

## 2019-01-19 RX ADMIN — METRONIDAZOLE 500 MG: 500 TABLET ORAL at 10:01

## 2019-01-19 RX ADMIN — POTASSIUM CHLORIDE 50 MEQ: 750 CAPSULE, EXTENDED RELEASE ORAL at 09:01

## 2019-01-19 RX ADMIN — ENOXAPARIN SODIUM 40 MG: 100 INJECTION SUBCUTANEOUS at 05:01

## 2019-01-19 NOTE — HPI
69 yo M with pmh HTN who originally presented on 1/12 with perineal pain and scrotal pain and was treated with cipro, he represented several days later septic and hypotensive and having diarrhea, blood cultures drawn at that time were + for multiple organisms, his urine culture was negative and UA was + for RBCs, WBCs and few bacteria. He was started on broad spectrum antibiotics and clinically has improved. He continues to have perineal pain. Urology was consulted for continued pain.     The patient endorses some perineal pain that is constant and also associated with voiding, he denies burning, straining, a feeling of incomplete emptying, gross hemturia, or other urinary symptoms. He reports that his pain was somewhat improved with antibiotics and ibuprofen.     CT scans from 1/14 and 1/15 are unrevealing for any abscess or infectious etiology, no hydronephrosis bilaterally     Scrotal ultrasound from 1/15 shows a left varicocele and a right hydrocele.

## 2019-01-19 NOTE — SUBJECTIVE & OBJECTIVE
Interval History:   Patient has improved significantly with addition of lasix (discontinued), currently on RA sating at 94%.  No abscess has identified on CT A/P. Vanc and Zosyn were discontinued by ID and started on CTX and Metronidazole for 2 weeks (started on 01/18). Patient was complaining of dysphagia, MBSS (01/17) is normal. Patient still complaining of testicular and perineal pain (improved), scrotal US showed R hydrocele and L varicocele. Urology was consulted, appreciate their recs.    Review of Systems   Constitutional: Negative for diaphoresis and fatigue.   HENT: Negative for sore throat and trouble swallowing.    Respiratory: Negative for cough, chest tightness, shortness of breath and wheezing.    Cardiovascular: Negative for chest pain, palpitations and leg swelling.   Gastrointestinal: Negative for nausea and vomiting.   Genitourinary: Negative for dysuria and hematuria.        Perineal and testicular pain   Musculoskeletal: Negative for arthralgias, back pain, myalgias, neck pain and neck stiffness.   Skin: Negative for color change, pallor and wound.   Allergic/Immunologic: Negative for immunocompromised state.   Neurological: Positive for weakness. Negative for headaches.   Hematological: Negative for adenopathy.   Psychiatric/Behavioral: Negative for agitation, behavioral problems and confusion.     Objective:     Vital Signs (Most Recent):  Temp: 99.1 °F (37.3 °C) (01/19/19 0832)  Pulse: 70 (01/19/19 0832)  Resp: 14 (01/19/19 0832)  BP: 135/60 (01/19/19 0832)  SpO2: (!) 94 % (01/19/19 0832) Vital Signs (24h Range):  Temp:  [98.2 °F (36.8 °C)-100.8 °F (38.2 °C)] 99.1 °F (37.3 °C)  Pulse:  [68-89] 70  Resp:  [14-20] 14  SpO2:  [91 %-94 %] 94 %  BP: (119-135)/(57-67) 135/60     Weight: 84.4 kg (186 lb 1.1 oz)  Body mass index is 27.48 kg/m².    Intake/Output Summary (Last 24 hours) at 1/19/2019 1130  Last data filed at 1/19/2019 0537  Gross per 24 hour   Intake --   Output 1450 ml   Net -1450 ml       Physical Exam   Constitutional: He is oriented to person, place, and time. Vital signs are normal. He appears well-developed. He is cooperative.   HENT:   Head: Normocephalic and atraumatic.   Eyes: Conjunctivae are normal. Pupils are equal, round, and reactive to light. Right eye exhibits no exudate. Left eye exhibits no exudate. Right conjunctiva has no hemorrhage. Left conjunctiva has no hemorrhage. Right eye exhibits no nystagmus. Left eye exhibits no nystagmus.   Neck: Trachea normal. No neck rigidity. No tracheal deviation present.   Cardiovascular: Normal rate and regular rhythm. Exam reveals no gallop.   No murmur heard.  Pulmonary/Chest: Effort normal. No accessory muscle usage. No tachypnea. No respiratory distress. He has no wheezes. He has no rhonchi. He has no rales.   Abdominal: Soft. Normal appearance and bowel sounds are normal. There is no tenderness.   Genitourinary:   Genitourinary Comments: Perineal pain when he sits up, improved when he walks. No testicular pain or tenderness. No open wounds or lesions at groin area.   Musculoskeletal: Normal range of motion.   Neurological: He is alert and oriented to person, place, and time. No cranial nerve deficit. GCS eye subscore is 4. GCS verbal subscore is 5. GCS motor subscore is 6.   Skin: Skin is warm and dry. Capillary refill takes 2 to 3 seconds. He is not diaphoretic. No cyanosis. Nails show no clubbing.   Psychiatric: He has a normal mood and affect. His speech is normal and behavior is normal.   Nursing note and vitals reviewed.      Significant Labs:   ABGs:   No results for input(s): PH, PCO2, HCO3, POCSATURATED, BE, TOTALHB, COHB, METHB, O2HB, POCFIO2 in the last 48 hours.  CBC:   Recent Labs   Lab 01/18/19  0609   WBC 12.24   HGB 12.6*   HCT 38.2*        CMP:   Recent Labs   Lab 01/18/19  0609 01/19/19  0345    137   K 3.7 3.5    107   CO2 27 23   GLU 83 92   BUN 17 12   CREATININE 1.0 0.8   CALCIUM 8.5* 8.5*   PROT 6.5   --    ALBUMIN 2.5*  --    BILITOT 0.7  --    ALKPHOS 97  --    AST 18  --    ALT 16  --    ANIONGAP 7* 7*   EGFRNONAA >60.0 >60.0     Magnesium:   Recent Labs   Lab 19  0609   MG 2.0     Urine Culture:   No results for input(s): LABURIN in the last 48 hours.  Urine Studies:   No results for input(s): COLORU, APPEARANCEUA, PHUR, SPECGRAV, PROTEINUA, GLUCUA, KETONESU, BILIRUBINUA, OCCULTUA, NITRITE, UROBILINOGEN, LEUKOCYTESUR, RBCUA, WBCUA, BACTERIA, SQUAMEPITHEL, HYALINECASTS in the last 48 hours.    Invalid input(s): WRIGHTSUR    Significant Imagin/15 CT ABDO-pelvis w/ contrast  1. No acute intra-abdominal abnormality identified.  2. Cholelithiasis.  3. Additional findings as above.    1/15 US Scrotum and testicles  No acute testicular pathology.  Right hydrocele and left varicocele present.  Small scrotal alejandro.  Small left epididymal head cysts.     CXR  The heart is not enlarged ill-defined patchy airspace disease and/or edema noted at the lung bases.  Upper lung fields are clear.  No pleural effusion.     CT abdo non-con  1. No acute intra-abdominal/pelvic CT abnormalities to account for the reported history of sepsis.  2. Right basilar subsegmental atelectasis or consolidation with trace associated dependent pleural fluid.  3. Cholelithiasis.  4. Subcentimeter hypodensity within the right hepatic dome, too small to accurately characterize.

## 2019-01-19 NOTE — CONSULTS
Single lumen 18 g x 10 cm midline placed to R-BRACHIAL vein.  Max dwell date 02.17.19, Lot# [id number]   Needle advances into vein under realtime Ultrasound guidance, image recorded and saved.

## 2019-01-19 NOTE — SUBJECTIVE & OBJECTIVE
Past Medical History:   Diagnosis Date    Essential hypertension 1/14/2019       Past Surgical History:   Procedure Laterality Date    SKIN GRAFT         Review of patient's allergies indicates:  No Known Allergies    Family History     None          Tobacco Use    Smoking status: Current Every Day Smoker     Packs/day: 0.50    Smokeless tobacco: Never Used   Substance and Sexual Activity    Alcohol use: Yes     Alcohol/week: 6.0 oz     Types: 10 Cans of beer per week    Drug use: No    Sexual activity: Not on file       Review of Systems   Constitutional: Negative for chills.   HENT: Negative for facial swelling.    Eyes: Negative for discharge.   Respiratory: Negative for apnea.    Cardiovascular: Negative for chest pain.   Gastrointestinal: Negative for abdominal distention, abdominal pain and vomiting.   Genitourinary: Negative for difficulty urinating, flank pain, frequency, hematuria, penile pain, scrotal swelling and urgency.   Musculoskeletal: Negative for arthralgias.   Skin: Negative for color change.   Neurological: Negative for dizziness.   Psychiatric/Behavioral: Negative for agitation.       Objective:     Temp:  [98.2 °F (36.8 °C)-100.8 °F (38.2 °C)] 100.8 °F (38.2 °C)  Pulse:  [68-89] 89  Resp:  [18-20] 18  SpO2:  [91 %-94 %] 93 %  BP: (103-126)/(57-67) 126/67     Body mass index is 27.48 kg/m².    Date 01/18/19 0700 - 01/19/19 0659   Shift 9462-6503 9631-8416 4198-0150 24 Hour Total   INTAKE   Shift Total(mL/kg)       OUTPUT   Urine(mL/kg/hr) 650(1) 900  1550   Emesis/NG output 0 0  0   Other 0 0  0   Stool 0 0  0   Blood 0 0  0   Shift Total(mL/kg) 650(7.7) 900(10.7)  1550(18.4)   Weight (kg) 84.4 84.4 84.4 84.4          Drains          None          Physical Exam   Nursing note and vitals reviewed.  Constitutional: He is oriented to person, place, and time. He appears well-developed and well-nourished. No distress.   HENT:   Head: Normocephalic and atraumatic.   Eyes: Pupils are equal,  round, and reactive to light. Right eye exhibits no discharge. Left eye exhibits no discharge.   Neck: Normal range of motion. No thyromegaly present.   Cardiovascular: Normal rate, regular rhythm and intact distal pulses.    Pulmonary/Chest: Effort normal. No respiratory distress.   Abdominal: Soft. He exhibits no distension. There is no tenderness. There is no rebound and no guarding.   Genitourinary:   Genitourinary Comments: Penis uncircumcised, foreskin easily retractable  No skin lesions, erythema or abnormalities  Testicles palpable bilaterally, normal palpable cord structures  Some pain with direct pressure over perineum and urethra.   DIEGO: 40g no nodules, nontender or painful on DIEGO, no rectal masses or visible abnormalities externally.   Neurological: He is alert and oriented to person, place, and time. No cranial nerve deficit.   Skin: Skin is warm and dry. He is not diaphoretic.     Psychiatric: He has a normal mood and affect. His behavior is normal. Judgment and thought content normal.       Significant Labs:    BMP:  Recent Labs   Lab 01/16/19 0416 01/17/19  0630 01/18/19  0609    144 142   K 3.3* 3.6 3.7    109 108   CO2 24 26 27   BUN 28* 27* 17   CREATININE 1.2 1.1 1.0   CALCIUM 8.8 8.9 8.5*       CBC:  Recent Labs   Lab 01/16/19 0416 01/17/19  0630 01/18/19  0609   WBC 16.63* 9.63 12.24   HGB 13.0* 13.3* 12.6*   HCT 38.8* 40.3 38.2*    200 231       Blood Culture:   Recent Labs   Lab 01/17/19  0630 01/18/19  0609 01/18/19  0616   LABBLOO No Growth to date  No Growth to date No Growth to date No Growth to date     Urine Culture:   Recent Labs   Lab 01/12/19 2150 01/13/19 1938   LABURIN No growth No growth     Urine Studies:   Recent Labs   Lab 01/12/19 2150 01/13/19 1938   COLORU Yellow Yellow   APPEARANCEUA Hazy* Hazy*   PHUR 5.0 5.0   SPECGRAV 1.010 1.010   PROTEINUA Negative Negative   GLUCUA Negative Negative   KETONESU Negative Negative   BILIRUBINUA Negative Negative    OCCULTUA 1+* 1+*   NITRITE Negative Negative   LEUKOCYTESUR 2+* 1+*   RBCUA 11* 7*   WBCUA 30* 17*   BACTERIA Occasional Rare   SQUAMEPITHEL 3 4       Significant Imaging:  All pertinent imaging results/findings from the past 24 hours have been reviewed.

## 2019-01-19 NOTE — ASSESSMENT & PLAN NOTE
Patient with recently diagnosed UTI, started on outpatient antibiotics the day prior to admission. He has been endorsing significant diarrhea for the past 4 days as well as decreased PO intake. On admission found to be febrile, tachycardic, and hypotensive. Lactate elevated at 3.4. Sepsis protocol initiated, given rocephin and IVF resuscitation, eventually broadened out to vancomycin and zosyn. Patient was hypoxic, requiring 3L nasal cannula. Patient with perineal tenderness, no obvious sign of Vicky's or cellulitis. No testicular swelling or tenderness.    Unclear source. CXR negative for consolidation. Patient with reproducible perineal pain however no overlying cellulitis or crepitus. Blood culture revealed gram positive cocci and gram negative rods.    Plan:  - ID consulted for staph bacteremia, appreciate their recs.  - Patient has oral herpes, 2 doses of Valacyclovir were given (last dose on 01/18).  - CT abdomen pelvis and Scrotal ultrasound did not show any clear source of infection  - dc Vanc and Zosyn and started on CTX and Metronidazole.  - C.diff negative  - Replete electrolytes as needed

## 2019-01-19 NOTE — ASSESSMENT & PLAN NOTE
67 yo M with possible histry of urosepsis and perineal pain    -Bacteremia was possible secondary to UTI but difficult to tell in light of negative urine culture  -Prostate exam is benign, so uncertain of etiology of perineal pain, but no drainable fluid collection or palpable abscess or abnormality concerning for infection on exam of imaging.   -Recommend obtaining post void residual volume to ensure patient is adequately emptying his bladder.  -Agree with continued empiric antibiotics in light of history of bacteremia and continue ibuprofen that may help with pain, consider scrotal support if significant scrotal pain.

## 2019-01-19 NOTE — PLAN OF CARE
ID Follow Up:  Patient not seen today.  Chart reviewed.   Afebrile. WBC trending up slightly 12>14   Urology has evaluated - prostate exam benign, no drainable fluid collection or palpable abscess or abnormality concerning for infection on exam or imaging.  Agrees with antibiotics  Currently on IV ceftriaxone and flagyl Anticipate 2 weeks of antibiotics.     Will see patient tomorrow.     Thank you.   Please call for any questions or concerns.  MARCO Carbajal, ANP-C  [phone], spectra [phone]

## 2019-01-19 NOTE — CONSULTS
[facility]  Urology  Consult Note    Patient Name: Brian Friedman  MRN: [id number]  Admission Date: 1/13/2019  Hospital Length of Stay: 5   Code Status: Full Code   Attending Provider: Omega Lea MD   Consulting Provider: Jude Tucker MD  Primary Care Physician: Primary Doctor No  Principal Problem:Sepsis    Inpatient consult to Urology  Consult performed by: Jude Tucker MD  Consult ordered by: Willy Malloy MD          Subjective:     HPI:  67 yo M with pmh HTN who originally presented on 1/12 with perineal pain and scrotal pain and was treated with cipro, he represented several days later septic and hypotensive and having diarrhea, blood cultures drawn at that time were + for multiple organisms, his urine culture was negative and UA was + for RBCs, WBCs and few bacteria. He was started on broad spectrum antibiotics and clinically has improved. He continues to have perineal pain. Urology was consulted for continued pain.     The patient endorses some perineal pain that is constant and also associated with voiding, he denies burning, straining, a feeling of incomplete emptying, gross hemturia, or other urinary symptoms. He reports that his pain was somewhat improved with antibiotics and ibuprofen.     CT scans from 1/14 and 1/15 are unrevealing for any abscess or infectious etiology, no hydronephrosis bilaterally     Scrotal ultrasound from 1/15 shows a left varicocele and a right hydrocele.         Past Medical History:   Diagnosis Date    Essential hypertension 1/14/2019       Past Surgical History:   Procedure Laterality Date    SKIN GRAFT         Review of patient's allergies indicates:  No Known Allergies    Family History     None          Tobacco Use    Smoking status: Current Every Day Smoker     Packs/day: 0.50    Smokeless tobacco: Never Used   Substance and Sexual Activity    Alcohol use: Yes     Alcohol/week: 6.0 oz     Types: 10 Cans of beer per week    Drug use: No     Sexual activity: Not on file       Review of Systems   Constitutional: Negative for chills.   HENT: Negative for facial swelling.    Eyes: Negative for discharge.   Respiratory: Negative for apnea.    Cardiovascular: Negative for chest pain.   Gastrointestinal: Negative for abdominal distention, abdominal pain and vomiting.   Genitourinary: Negative for difficulty urinating, flank pain, frequency, hematuria, penile pain, scrotal swelling and urgency.   Musculoskeletal: Negative for arthralgias.   Skin: Negative for color change.   Neurological: Negative for dizziness.   Psychiatric/Behavioral: Negative for agitation.       Objective:     Temp:  [98.2 °F (36.8 °C)-100.8 °F (38.2 °C)] 100.8 °F (38.2 °C)  Pulse:  [68-89] 89  Resp:  [18-20] 18  SpO2:  [91 %-94 %] 93 %  BP: (103-126)/(57-67) 126/67     Body mass index is 27.48 kg/m².    Date 01/18/19 0700 - 01/19/19 0659   Shift 6648-4208 [id number] 6684-9713 24 Hour Total   INTAKE   Shift Total(mL/kg)       OUTPUT   Urine(mL/kg/hr) 650(1) 900  1550   Emesis/NG output 0 0  0   Other 0 0  0   Stool 0 0  0   Blood 0 0  0   Shift Total(mL/kg) 650(7.7) 900(10.7)  1550(18.4)   Weight (kg) 84.4 84.4 84.4 84.4          Drains          None          Physical Exam   Nursing note and vitals reviewed.  Constitutional: He is oriented to person, place, and time. He appears well-developed and well-nourished. No distress.   HENT:   Head: Normocephalic and atraumatic.   Eyes: Pupils are equal, round, and reactive to light. Right eye exhibits no discharge. Left eye exhibits no discharge.   Neck: Normal range of motion. No thyromegaly present.   Cardiovascular: Normal rate, regular rhythm and intact distal pulses.    Pulmonary/Chest: Effort normal. No respiratory distress.   Abdominal: Soft. He exhibits no distension. There is no tenderness. There is no rebound and no guarding.   Genitourinary:   Genitourinary Comments: Penis uncircumcised, foreskin easily retractable  No skin lesions,  erythema or abnormalities  Testicles palpable bilaterally, normal palpable cord structures  Some pain with direct pressure over perineum and urethra.   DIEGO: 40g no nodules, nontender or painful on DIEGO, no rectal masses or visible abnormalities externally.   Neurological: He is alert and oriented to person, place, and time. No cranial nerve deficit.   Skin: Skin is warm and dry. He is not diaphoretic.     Psychiatric: He has a normal mood and affect. His behavior is normal. Judgment and thought content normal.       Significant Labs:    BMP:  Recent Labs   Lab 01/16/19 0416 01/17/19  0630 01/18/19  0609    144 142   K 3.3* 3.6 3.7    109 108   CO2 24 26 27   BUN 28* 27* 17   CREATININE 1.2 1.1 1.0   CALCIUM 8.8 8.9 8.5*       CBC:  Recent Labs   Lab 01/16/19 0416 01/17/19  0630 01/18/19  0609   WBC 16.63* 9.63 12.24   HGB 13.0* 13.3* 12.6*   HCT 38.8* 40.3 38.2*    200 231       Blood Culture:   Recent Labs   Lab 01/17/19  0630 01/18/19  0609 01/18/19  0616   LABBLOO No Growth to date  No Growth to date No Growth to date No Growth to date     Urine Culture:   Recent Labs   Lab 01/12/19 2150 01/13/19 1938   LABURIN No growth No growth     Urine Studies:   Recent Labs   Lab 01/12/19 2150 01/13/19 1938   COLORU Yellow Yellow   APPEARANCEUA Hazy* Hazy*   PHUR 5.0 5.0   SPECGRAV 1.010 1.010   PROTEINUA Negative Negative   GLUCUA Negative Negative   KETONESU Negative Negative   BILIRUBINUA Negative Negative   OCCULTUA 1+* 1+*   NITRITE Negative Negative   LEUKOCYTESUR 2+* 1+*   RBCUA 11* 7*   WBCUA 30* 17*   BACTERIA Occasional Rare   SQUAMEPITHEL 3 4       Significant Imaging:  All pertinent imaging results/findings from the past 24 hours have been reviewed.                    Assessment and Plan:     Acute cystitis without hematuria    69 yo M with possible histry of urosepsis and perineal pain    -Bacteremia was possible secondary to UTI but difficult to tell in light of negative urine  culture  -Prostate exam is benign, so uncertain of etiology of perineal pain, but no drainable fluid collection or palpable abscess or abnormality concerning for infection on exam of imaging.   -Recommend obtaining post void residual volume to ensure patient is adequately emptying his bladder.  -Agree with continued empiric antibiotics in light of history of bacteremia and continue ibuprofen that may help with pain, consider scrotal support if significant scrotal pain.          VTE Risk Mitigation (From admission, onward)        Ordered     enoxaparin injection 40 mg  Daily      01/15/19 1155     Place sequential compression device  Until discontinued      01/13/19 2327     IP VTE LOW RISK PATIENT  Once      01/13/19 2327          Thank you for your consult. I will sign off. Please contact us if you have any additional questions.    Jude Tucker MD  Urology  [facility]    Agree with the above.

## 2019-01-19 NOTE — ASSESSMENT & PLAN NOTE
- Patient has 8/10 sharp pain which is getting worse by sitting up and improve with walking.  - Started on Ibuprofen 600 mg TID prn. Pain has improved (scale 2 out of 10)  - Scrotal US showed R hydrocele and L varicocele.  - Urology was consulted, appreciate their recs.

## 2019-01-19 NOTE — PROGRESS NOTES
"[facility] Hospital Medicine  Progress Note    Patient Name: Brian Friedman  MRN: [id number]  Patient Class: IP- Inpatient   Admission Date: 1/13/2019  Length of Stay: 6 days  Attending Physician: Omega Lea MD  Primary Care Provider: Primary Doctor [facility] Medicine Team: [facility]OSP MED 3 Chuck Gramajo MD    Subjective:     Principal Problem:Sepsis    HPI:  Mr. Friedman is a 68 year old male with a history of HTN who presented to [facility] ED for evaluation of dizziness. The patient had been seen the previous day (1/12/19) in the ED complaining of dysuria and "brown" colored urine. He endorsed testicular pain that had been present for roughly 1 week. At that time urinalysis showed WBC's and occasional bacteria, cultures are still pending. He was started on cipro for presumptive prostatitis and discharged home. The patient had two doses of cipro total at home before representing to the ED. A this time he was endorsing dizziness, fatigue, and near syncope. He endorsed fevers, chills, rigors, and diarrhea at home. The patient states that he had been having diarrhea for roughly 4 days or so prior to presenting. Patient describes watery brown stools roughly every 2 hours. He denies nausea, vomiting, or abdominal pain. The patient has had poor PO intake over those few days, eating and drinking very little because he did not want to have more diarrhea. He denies sick contacts. At this time the patient denies dysuria, hematuria, pyuria, penial discharge, scrotal pain or swelling. He states he notices a pain in his perineal area when he sits and he does endorse mild low back pain across his lower back. Patient endorsing cough for past week productive of thick white sputum as well. The patient is sexually active with one long term partner and does not use condoms. He denies history of STI. The patient states he drinks 1 pint of alcohol most days for the past 10-20 years. His last drink was 4 days " ago.    In the ED the patient was hypotensive, tachyonic, and tachycardic. Troponin was elevated and lactate was 3.4. The patient denied chest pain. He was given rocephin and 3L fluid resuscitation. Critical care was consulted for possible septic shock. The patient's initial blood pressure was 90/50 with a pulse rate of 152 and he was hypoxic (satting in the 80's). Patient's vitals improved with fluid resuscitation and supplemental oxygen. Lactate trended down to 1.6. Antibiotics were broadened to vancomycin and zosyn and C.diff was sent. Patient determined stable for floor and patient was admitted hospital medicine for further treatment of severe sepsis.      Interval History:   Patient has improved significantly with addition of lasix (discontinued), currently on RA sating at 94%.  No abscess has identified on CT A/P. He has one spike of fever at 100.8 on 01/18. Vanc and Zosyn were discontinued by ID and started on CTX and Metronidazole for 2 weeks (started on 01/18). Patient was complaining of dysphagia, MBSS (01/17) is normal. Patient still complaining of testicular and perineal pain (improved), scrotal US showed R hydrocele and L varicocele. Urology was consulted, appreciate their recs.    Review of Systems   Constitutional: Negative for diaphoresis and fatigue.   HENT: Negative for sore throat and trouble swallowing.    Respiratory: Negative for cough, chest tightness, shortness of breath and wheezing.    Cardiovascular: Negative for chest pain, palpitations and leg swelling.   Gastrointestinal: Negative for nausea and vomiting.   Genitourinary: Negative for dysuria and hematuria.        Perineal and testicular pain   Musculoskeletal: Negative for arthralgias, back pain, myalgias, neck pain and neck stiffness.   Skin: Negative for color change, pallor and wound.   Allergic/Immunologic: Negative for immunocompromised state.   Neurological: Positive for weakness. Negative for headaches.   Hematological: Negative  for adenopathy.   Psychiatric/Behavioral: Negative for agitation, behavioral problems and confusion.     Objective:     Vital Signs (Most Recent):  Temp: 99.1 °F (37.3 °C) (01/19/19 0832)  Pulse: 70 (01/19/19 0832)  Resp: 14 (01/19/19 0832)  BP: 135/60 (01/19/19 0832)  SpO2: (!) 94 % (01/19/19 0832) Vital Signs (24h Range):  Temp:  [98.2 °F (36.8 °C)-100.8 °F (38.2 °C)] 99.1 °F (37.3 °C)  Pulse:  [68-89] 70  Resp:  [14-20] 14  SpO2:  [91 %-94 %] 94 %  BP: (119-135)/(57-67) 135/60     Weight: 84.4 kg (186 lb 1.1 oz)  Body mass index is 27.48 kg/m².    Intake/Output Summary (Last 24 hours) at 1/19/2019 1137  Last data filed at 1/19/2019 0537  Gross per 24 hour   Intake --   Output 1450 ml   Net -1450 ml      Physical Exam   Constitutional: He is oriented to person, place, and time. Vital signs are normal. He appears well-developed. He is cooperative.   HENT:   Head: Normocephalic and atraumatic.   Eyes: Conjunctivae are normal. Pupils are equal, round, and reactive to light. Right eye exhibits no exudate. Left eye exhibits no exudate. Right conjunctiva has no hemorrhage. Left conjunctiva has no hemorrhage. Right eye exhibits no nystagmus. Left eye exhibits no nystagmus.   Neck: Trachea normal. No neck rigidity. No tracheal deviation present.   Cardiovascular: Normal rate and regular rhythm. Exam reveals no gallop.   No murmur heard.  Pulmonary/Chest: Effort normal. No accessory muscle usage. No tachypnea. No respiratory distress. He has no wheezes. He has no rhonchi. He has no rales.   Abdominal: Soft. Normal appearance and bowel sounds are normal. There is no tenderness.   Genitourinary:   Genitourinary Comments: Perineal pain when he sits up, improved when he walks. No testicular pain or tenderness. No open wounds or lesions at groin area.   Musculoskeletal: Normal range of motion.   Neurological: He is alert and oriented to person, place, and time. No cranial nerve deficit. GCS eye subscore is 4. GCS verbal  subscore is 5. GCS motor subscore is 6.   Skin: Skin is warm and dry. Capillary refill takes 2 to 3 seconds. He is not diaphoretic. No cyanosis. Nails show no clubbing.   Psychiatric: He has a normal mood and affect. His speech is normal and behavior is normal.   Nursing note and vitals reviewed.      Significant Labs:   ABGs:   No results for input(s): PH, PCO2, HCO3, POCSATURATED, BE, TOTALHB, COHB, METHB, O2HB, POCFIO2 in the last 48 hours.  CBC:   Recent Labs   Lab 19  0609   WBC 12.24   HGB 12.6*   HCT 38.2*        CMP:   Recent Labs   Lab 19  0619  0345    137   K 3.7 3.5    107   CO2 27 23   GLU 83 92   BUN 17 12   CREATININE 1.0 0.8   CALCIUM 8.5* 8.5*   PROT 6.5  --    ALBUMIN 2.5*  --    BILITOT 0.7  --    ALKPHOS 97  --    AST 18  --    ALT 16  --    ANIONGAP 7* 7*   EGFRNONAA >60.0 >60.0     Magnesium:   Recent Labs   Lab 19  0609   MG 2.0     Urine Culture:   No results for input(s): LABURIN in the last 48 hours.  Urine Studies:   No results for input(s): COLORU, APPEARANCEUA, PHUR, SPECGRAV, PROTEINUA, GLUCUA, KETONESU, BILIRUBINUA, OCCULTUA, NITRITE, UROBILINOGEN, LEUKOCYTESUR, RBCUA, WBCUA, BACTERIA, SQUAMEPITHEL, HYALINECASTS in the last 48 hours.    Invalid input(s): WRIGHTSUR    Significant Imagin/15 CT ABDO-pelvis w/ contrast  1. No acute intra-abdominal abnormality identified.  2. Cholelithiasis.  3. Additional findings as above.    1/15 US Scrotum and testicles  No acute testicular pathology.  Right hydrocele and left varicocele present.  Small scrotal alejandro.  Small left epididymal head cysts.     CXR  The heart is not enlarged ill-defined patchy airspace disease and/or edema noted at the lung bases.  Upper lung fields are clear.  No pleural effusion.     CT abdo non-con  1. No acute intra-abdominal/pelvic CT abnormalities to account for the reported history of sepsis.  2. Right basilar subsegmental atelectasis or consolidation  with trace associated dependent pleural fluid.  3. Cholelithiasis.  4. Subcentimeter hypodensity within the right hepatic dome, too small to accurately characterize.    Assessment/Plan:      * Sepsis    Patient with recently diagnosed UTI, started on outpatient antibiotics the day prior to admission. He has been endorsing significant diarrhea for the past 4 days as well as decreased PO intake. On admission found to be febrile, tachycardic, and hypotensive. Lactate elevated at 3.4. Sepsis protocol initiated, given rocephin and IVF resuscitation, eventually broadened out to vancomycin and zosyn. Patient was hypoxic, requiring 3L nasal cannula. Patient with perineal tenderness, no obvious sign of Vicky's or cellulitis. No testicular swelling or tenderness.    Unclear source. CXR negative for consolidation. Patient with reproducible perineal pain however no overlying cellulitis or crepitus. Blood culture revealed gram positive cocci and gram negative rods.    Plan:  - ID consulted for staph bacteremia, appreciate their recs.  - Patient has oral herpes, 2 doses of Valacyclovir were given (last dose on 01/18).  - CT abdomen pelvis and Scrotal ultrasound did not show any clear source of infection  - dc Vanc and Zosyn and started on CTX and Metronidazole.  - C.diff negative  - Replete electrolytes as needed     Perineal pain    - Patient has 8/10 sharp pain which is getting worse by sitting up and improve with walking.  - Started on Ibuprofen 600 mg TID prn. Pain has improved (scale 2 out of 10)  - Scrotal US showed R hydrocele and L varicocele.  - Urology was consulted, appreciate their recs.     Acute respiratory failure with hypoxia    - Patient with sepsis not on home oxygen but requiring 3L nasal cannula in ED. CXR showing no sign of intrapulmonary pathology. Well's score is 0. Patient not complaining of chest pain.    - Patient developed severe pulmonary edema after initiation of sepsis ordered fluids (~3L)  -  "Improved significantly with diuresis, lasix 40mg BID IV (discontinued)  - currently on RA sating at 94%     Dysphagia    - Patient is complaining of difficulty swallowing to both solids and liquids.  - SLP was consulted for evaluation.  - MBSS on 01/17 showed no abnormalities.  - Will f/u with ENT as an outpatient.       Discharge planning issues    Patient lives at home with another family member, able to complete ADL's without difficulty    - PT/OT consulted  - Social work consulted       Acute cystitis without hematuria    Patient diagnosed with UTI in ED on 1/12/19    - Urinalysis notable for WBCs and bacteria. Prescribed Cipro, patient took two doses, and reported improvement in perineal pain with medication  - Now presenting with sepsis, likely combined with dehydration 2/2 concomitant diarrheal illness, urine culture is negative  - Now on broad spectrum antibiotics  - see "sepsis"     Essential hypertension    Patient diagnosed with hypertension within the past year. Patient states he is on an antihypertensive at home but is unclear of the name. No documentation in chart as to what patient is taking. Patient states he has not taken the medication for several days and often forgets dose.    - given hypotension requiring IVF resuscitation at time of admission, will continue holding any antihypertensives         VTE Risk Mitigation (From admission, onward)        Ordered     enoxaparin injection 40 mg  Daily      01/15/19 1155     Place sequential compression device  Until discontinued      01/13/19 2329     IP VTE LOW RISK PATIENT  Once      01/13/19 8149              Chuck Gramajo MD  Department of Hospital Medicine   [facility]  "

## 2019-01-19 NOTE — PLAN OF CARE
[facility]    HOME HEALTH ORDERS  FACE TO FACE ENCOUNTER    Patient Name: Brian Friedman  YOB: 1950    PCP: Primary Doctor No   PCP Address: None  PCP Phone Number: None  PCP Fax: None    Encounter Date: 01/19/2019    Admit to Home Health    Diagnoses:  Active Hospital Problems    Diagnosis  POA    *Sepsis [A41.9]  Yes     Priority: 1 - High    Perineal pain [R10.2]  Unknown     Priority: 2     Acute respiratory failure with hypoxia [J96.01]  Yes     Priority: 3     Dysphagia [R13.10]  Yes     Priority: 4     Essential hypertension [I10]  Yes    Acute cystitis without hematuria [N30.00]  Yes    Discharge planning issues [Z02.9]  Not Applicable      Resolved Hospital Problems    Diagnosis Date Resolved POA    JUAN (acute kidney injury) [N17.9] 01/18/2019 Yes     Priority: 2     Hypotension [I95.9] 01/18/2019 Yes    Elevated troponin [R74.8] 01/14/2019 Yes       No future appointments.        I have seen and examined this patient face to face today. My clinical findings that support the need for the home health skilled services and home bound status are the following:  Weakness/numbness causing balance and gait disturbance due to Infection and Weakness/Debility making it taxing to leave home.    Allergies:Review of patient's allergies indicates:  No Known Allergies    Diet: regular diet    Activities: activity as tolerated    Nursing:   SN to complete comprehensive assessment including routine vital signs. Instruct on disease process and s/s of complications to report to MD. Review/verify medication list sent home with the patient at time of discharge  and instruct patient/caregiver as needed. Frequency may be adjusted depending on start of care date.    Notify MD if SBP > 160 or < 90; DBP > 90 or < 50; HR > 120 or < 50; Temp > 101      CONSULTS:    Physical Therapy to evaluate and treat. Evaluate for home safety and equipment needs; Establish/upgrade home exercise program.  Perform / instruct on therapeutic exercises, gait training, transfer training, and Range of Motion.    MISCELLANEOUS CARE:  Midline has placed for IV Abx on 01/19/2019  Patient on Ceftriaxone 2g IV Q24h for 2 weeks (last dose will be on 02/02/2019).  Continue on oral Metronidazole 500mg Q8h for 2 weeks (last dose will be on 02/01/2019).      WOUND CARE ORDERS  n/a      Medications: Review discharge medications with patient and family and provide education.       Brian Friedman   Home Medication Instructions LYNN:[id number]    Printed on:01/19/19 1015   Medication Information                      cefTRIAXone 2 g in dextrose 5 % 50 mL (ROCEPHIN) 2 g/50 mL PgBk IVPB  Inject 50 mLs (2 g total) into the vein once daily for 14 days (last dose will be on 02/02/2019).   Midline placed             ibuprofen (ADVIL,MOTRIN) 600 MG tablet  Take 1 tablet (600 mg total) by mouth 2 (two) times daily. for 7 days             lisinopril-hydrochlorothiazide (PRINZIDE,ZESTORETIC) 10-12.5 mg per tablet  Take 1 tablet by mouth once daily.             metroNIDAZOLE (FLAGYL) 500 MG tablet  Take 1 tablet (500 mg total) by mouth every 8 (eight) hours. for 14 days  (last dose will be on 02/02/2019).               I certify that this patient is confined to his home and needs physical therapy.    Plan:  1. Continue rocephin 2 gram IV q 24 hours and flagyl 500 mg PO TID X 14 days.  End date 2/2/19  2.  Weekly cbc, cmp and fax result to ID at [phone]  3.  ID follow up prior to end of therapy.

## 2019-01-20 VITALS
SYSTOLIC BLOOD PRESSURE: 120 MMHG | DIASTOLIC BLOOD PRESSURE: 66 MMHG | HEIGHT: 69 IN | TEMPERATURE: 98 F | RESPIRATION RATE: 14 BRPM | BODY MASS INDEX: 27.56 KG/M2 | OXYGEN SATURATION: 93 % | WEIGHT: 186.06 LBS | HEART RATE: 77 BPM

## 2019-01-20 LAB
BACTERIA BLD CULT: NORMAL
BACTERIA BLD CULT: NORMAL
BASOPHILS # BLD AUTO: 0.02 K/UL
BASOPHILS NFR BLD: 0.1 %
DIFFERENTIAL METHOD: ABNORMAL
EOSINOPHIL # BLD AUTO: 0.1 K/UL
EOSINOPHIL NFR BLD: 0.9 %
ERYTHROCYTE [DISTWIDTH] IN BLOOD BY AUTOMATED COUNT: 12.7 %
HCT VFR BLD AUTO: 34.7 %
HGB BLD-MCNC: 11.7 G/DL
IMM GRANULOCYTES # BLD AUTO: 0.11 K/UL
IMM GRANULOCYTES NFR BLD AUTO: 0.8 %
LYMPHOCYTES # BLD AUTO: 2.5 K/UL
LYMPHOCYTES NFR BLD: 18 %
MCH RBC QN AUTO: 33.7 PG
MCHC RBC AUTO-ENTMCNC: 33.7 G/DL
MCV RBC AUTO: 100 FL
MONOCYTES # BLD AUTO: 1 K/UL
MONOCYTES NFR BLD: 7.3 %
NEUTROPHILS # BLD AUTO: 10.3 K/UL
NEUTROPHILS NFR BLD: 72.9 %
NRBC BLD-RTO: 0 /100 WBC
PLATELET # BLD AUTO: 310 K/UL
PMV BLD AUTO: 10.1 FL
POCT GLUCOSE: 133 MG/DL (ref 70–110)
POCT GLUCOSE: 86 MG/DL (ref 70–110)
RBC # BLD AUTO: 3.47 M/UL
WBC # BLD AUTO: 14.11 K/UL

## 2019-01-20 PROCEDURE — 99238 PR HOSPITAL DISCHARGE DAY,<30 MIN: ICD-10-PCS | Mod: GC,,, | Performed by: HOSPITALIST

## 2019-01-20 PROCEDURE — 36415 COLL VENOUS BLD VENIPUNCTURE: CPT

## 2019-01-20 PROCEDURE — 99232 PR SUBSEQUENT HOSPITAL CARE,LEVL II: ICD-10-PCS | Mod: ,,, | Performed by: INTERNAL MEDICINE

## 2019-01-20 PROCEDURE — 25000003 PHARM REV CODE 250: Performed by: STUDENT IN AN ORGANIZED HEALTH CARE EDUCATION/TRAINING PROGRAM

## 2019-01-20 PROCEDURE — 25000003 PHARM REV CODE 250: Performed by: PHYSICIAN ASSISTANT

## 2019-01-20 PROCEDURE — S4991 NICOTINE PATCH NONLEGEND: HCPCS | Performed by: STUDENT IN AN ORGANIZED HEALTH CARE EDUCATION/TRAINING PROGRAM

## 2019-01-20 PROCEDURE — 85025 COMPLETE CBC W/AUTO DIFF WBC: CPT

## 2019-01-20 PROCEDURE — 99232 SBSQ HOSP IP/OBS MODERATE 35: CPT | Mod: ,,, | Performed by: INTERNAL MEDICINE

## 2019-01-20 PROCEDURE — 99238 HOSP IP/OBS DSCHRG MGMT 30/<: CPT | Mod: GC,,, | Performed by: HOSPITALIST

## 2019-01-20 PROCEDURE — 63600175 PHARM REV CODE 636 W HCPCS: Performed by: STUDENT IN AN ORGANIZED HEALTH CARE EDUCATION/TRAINING PROGRAM

## 2019-01-20 RX ADMIN — CEFTRIAXONE 2 G: 2 INJECTION, SOLUTION INTRAVENOUS at 11:01

## 2019-01-20 RX ADMIN — NICOTINE 1 PATCH: 21 PATCH, EXTENDED RELEASE TRANSDERMAL at 08:01

## 2019-01-20 RX ADMIN — METRONIDAZOLE 500 MG: 500 TABLET ORAL at 05:01

## 2019-01-20 RX ADMIN — METRONIDAZOLE 500 MG: 500 TABLET ORAL at 12:01

## 2019-01-20 NOTE — PROGRESS NOTES
[facility]  Infectious Disease  Progress Note    Patient Name: Brian Friedman  MRN: [id number]  Admission Date: 1/13/2019  Length of Stay: 7 days  Attending Physician: No att. providers found  Primary Care Provider: Primary Doctor No    Isolation Status: No active isolations  Assessment/Plan:      * Sepsis    68 year old male presents with fevers, chills, rigors, diarrhea, dizziness, and perineal pain and found to be septic in the ER as he was febrile, tachycardic, hypotensive, hypoxic, with lactic acid of 3.4 and leukocytosis; CT abdomen/pelvis negative; ultrasound of scrotum negative; no signs of cellulitis on exam. pt started on empiric antibiotics and has improved; afebrile  - urine culture negative; c diff negative  - blood cultures in 1 set positive for MSSE; other set with prevotella biva  - pt developed fever blisters while inpatient; treated with valtrex  - GC/chlamydia negative    Patient has mild leukocytosis of 14, stable; pt afebrile. 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  Urology has evaluated and and finds no abnormality concerning for infection on exam or imaging. Today patient reports pain is minimal - 1/10.  Much improved from admit    Plan:  1. Continue rocephin 2 gram IV q 24 hours and flagyl 500 mg PO TID X 14 days.  End date 2/2/19  2.  Weekly cbc, cmp and fax result to ID at [phone]  3.  ID follow up prior to end of therapy.   4.  Will sign off.  Please call or re-consult as needed.      Patient seen by, and plan discussed with, ID staff  Discussed with Primary Team        Thank you.   Please call for any questions or concerns.  MARCO Carbajal, ANP-C  [phone]    Subjective:     Principal Problem:Sepsis    HPI: Mr. Friedman is a 68 year old male with a history of HTN who presented  "to [facility] ED for evaluation of dizziness. The patient had been seen the previous day (1/12/19) in the ED complaining of dysuria and "brown" colored urine. He endorsed testicular pain that had been present for roughly 1 week. At that time urinalysis showed WBC's and occasional bacteria, cultures negative. He was started on cipro for presumptive prostatitis and discharged home. The patient had two doses of cipro total at home before representing to the ED. A this time he was endorsing dizziness, fatigue, and near syncope. He endorsed fevers, chills, rigors, and diarrhea at home. The patient states that he had been having diarrhea for roughly 4 days or so prior to presenting He denies nausea, vomiting, or abdominal pain. He denies sick contacts. At this time the patient denies dysuria, hematuria, pyuria, penial discharge, scrotal pain or swelling. He states he notices a pain in his perineal area when he sits and he does endorse mild low back pain across his lower back. Patient endorsing cough for past week productive of thick white sputum as well. The patient is sexually active with one long term partner and does not use condoms. He denies history of STI.      In the ED the patient was hypotensive, tachyonic, and tachycardic. Troponin was elevated and lactate was 3.4. The patient denied chest pain. He was given rocephin and 3L fluid resuscitation. Critical care was consulted for possible septic shock. The patient's initial blood pressure was 90/50 with a pulse rate of 152 and he was hypoxic (satting in the 80's). Patient's vitals improved with fluid resuscitation and supplemental oxygen. Lactate trended down to 1.6. Antibiotics were broadened to vancomycin and zosyn and C.diff was sent.    Patient denies foreign travel. Had no pets at home. No longer works. Has mainly indoor hobbies. Denies penile discharge. States that the pain underneath his scrotum is still present but has improved. Diarrhea still present.   Interval " History: pt afebrile. Reports pain is much improved - was 10/10 on admit now a 1/10.  WBC stable at 14.  Urology has evaluated - prostate exam benign, no drainable fluid collection or palpable abscess or abnormality concerning for infection on exam or imaging.  Agrees with antibiotics  Currently on IV ceftriaxone and flagyl.   Anticipate 2 weeks of antibiotics.   Anticipating discharge home today.     . Review of Systems   Constitutional: Negative for chills, diaphoresis, fatigue and fever.   HENT:        (+) fever blisters   Respiratory: Negative for cough and shortness of breath.    Cardiovascular: Negative for chest pain and leg swelling.   Gastrointestinal: Negative for abdominal pain, constipation, nausea and vomiting.   Genitourinary: Positive for testicular pain (minimal - much improved). Negative for difficulty urinating, dysuria, flank pain, frequency, hematuria, scrotal swelling and urgency.   Musculoskeletal: Positive for back pain. Negative for myalgias.   Skin: Negative for rash and wound.   Neurological: Negative for dizziness, light-headedness and headaches.   Psychiatric/Behavioral: Negative for agitation and behavioral problems. The patient is not nervous/anxious.      Objective:     Vital Signs (Most Recent):  Temp: 99.1 °F (37.3 °C) (01/20/19 0739)  Pulse: 75 (01/20/19 0739)  Resp: 14 (01/20/19 0739)  BP: 130/60 (01/20/19 0739)  SpO2: (!) 92 % (01/20/19 0739) Vital Signs (24h Range):  Temp:  [97.2 °F (36.2 °C)-99.2 °F (37.3 °C)] 99.1 °F (37.3 °C)  Pulse:  [73-98] 75  Resp:  [14-20] 14  SpO2:  [91 %-99 %] 92 %  BP: (114-130)/(58-61) 130/60     Weight: 84.4 kg (186 lb 1.1 oz)  Body mass index is 27.48 kg/m².    Estimated Creatinine Clearance: 90.8 mL/min (based on SCr of 0.8 mg/dL).    Physical Exam   Constitutional: He is oriented to person, place, and time. He appears well-developed and well-nourished. No distress.   HENT:   Fever blisters noted to bottom lip   Cardiovascular: Normal rate and  regular rhythm.   No murmur heard.  Pulmonary/Chest: Effort normal and breath sounds normal. No respiratory distress.   Abdominal: Soft. Bowel sounds are normal. He exhibits no distension.   Genitourinary:   Genitourinary Comments: Minimal tenderness to palpation/pressure perineal area.   No scrotal/penile lesions noted.  No erythema, warmth   Musculoskeletal: Normal range of motion. He exhibits no edema.   Neurological: He is alert and oriented to person, place, and time.   Skin: Skin is warm and dry.   Psychiatric: He has a normal mood and affect. His behavior is normal.       Significant Labs:   Blood Culture:   Recent Labs   Lab 01/15/19  1202 01/16/19  0416 01/17/19  0630 01/18/19  0609 01/18/19  0616   LABBLOO No Growth to date  No Growth to date  No Growth to date  No Growth to date  No Growth to date  No Growth to date  No Growth to date  No Growth to date  No Growth to date  No Growth to date No Growth to date  No Growth to date  No Growth to date  No Growth to date  No Growth to date  No Growth to date  No Growth to date  No Growth to date  No Growth to date  No Growth to date No Growth to date  No Growth to date  No Growth to date No Growth to date  No Growth to date  No Growth to date No Growth to date  No Growth to date  No Growth to date     CBC:   Recent Labs   Lab 01/19/19  1126 01/20/19  0327   WBC 14.46* 14.11*   HGB 12.4* 11.7*   HCT 36.9* 34.7*    310     CMP:   Recent Labs   Lab 01/19/19  0345      K 3.5      CO2 23   GLU 92   BUN 12   CREATININE 0.8   CALCIUM 8.5*   ANIONGAP 7*   EGFRNONAA >60.0     Urine Culture:   Recent Labs   Lab 01/12/19  2150 01/13/19  1938   LABURIN No growth No growth     Urine Studies:   Recent Labs   Lab 01/13/19 1938   COLORU Yellow   APPEARANCEUA Hazy*   PHUR 5.0   SPECGRAV 1.010   PROTEINUA Negative   GLUCUA Negative   KETONESU Negative   BILIRUBINUA Negative   OCCULTUA 1+*   NITRITE Negative   LEUKOCYTESUR 1+*   RBCUA 7*    WBCUA 17*   BACTERIA Rare   SQUAMEPITHEL 4     Wound Culture: No results for input(s): LABAERO in the last 4320 hours.    Significant Imaging: I have reviewed all pertinent imaging results/findings within the past 24 hours.

## 2019-01-20 NOTE — NURSING
PIV to left forearm d/c with cannula intact and coban pressure dressing applied.  D/C instructions reviewed with patient, questions answered and copy of instructions given to patient who verbalized understanding.  Patient d/c home with right upper arm midline for continuation of IV antibiotic therapy.

## 2019-01-20 NOTE — ASSESSMENT & PLAN NOTE
68 year old male presents with fevers, chills, rigors, diarrhea, dizziness, and perineal pain and found to be septic in the ER as he was febrile, tachycardic, hypotensive, hypoxic, with lactic acid of 3.4 and leukocytosis; CT abdomen/pelvis negative; ultrasound of scrotum negative; no signs of cellulitis on exam. pt started on empiric antibiotics and has improved; afebrile  - urine culture negative; c diff negative  - blood cultures in 1 set positive for MSSE; other set with prevotella biva  - pt developed fever blisters while inpatient; treated with valtrex  - GC/chlamydia negative    Patient has mild leukocytosis of 14, stable; pt afebrile. 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  Urology has evaluated and and finds no abnormality concerning for infection on exam or imaging. Today patient reports pain is minimal - 1/10.  Much improved from admit    Plan:  1. Continue rocephin 2 gram IV q 24 hours and flagyl 500 mg PO TID X 14 days.  End date 2/2/19  2.  Weekly cbc, cmp and fax result to ID at [phone]  3.  ID follow up prior to end of therapy.   4.  Will sign off.  Please call or re-consult as needed.      Patient seen by, and plan discussed with, ID staff  Discussed with Primary Team

## 2019-01-20 NOTE — PLAN OF CARE
CM updated by Dr Lea that pt will d/c home today and needs h/h w/ IV abx. CM to bedside to verify contact info and obtain choices - pt w/ no preference. CM forwarded h/h orders to [facility] h/h. CM forwarded infusion orders to [facility] - spoke w/ Adam and he stated that he will f/u pt concerning teaching, delivery, etc. Pt will [place] today's [place] [facility] [place] to [place]/c.     01/20/19 1219   Final Note   Assessment Type Final Discharge Note   Anticipated Discharge Disposition Home-Health   What phone number can be called within the next 1-3 days to see how you are doing after discharge? [phone]   Right Care Referral Info   Post Acute Recommendation Home-care   Referral Type h/h   Facility Name [facility] h/h

## 2019-01-20 NOTE — PLAN OF CARE
Problem: Adult Inpatient Plan of Care  Goal: Plan of Care Review  Outcome: Ongoing (interventions implemented as appropriate)  Patient AAOx4, ambulatory.  Patient reports no pain or discomfort.  Vital signs stable.  No major events this shift; no falls or injuries.  Wife to remain at bedside.  Will continue to monitor.

## 2019-01-20 NOTE — DISCHARGE SUMMARY
"[facility] Hospital Medicine  Discharge Summary      Patient Name: Brian Friedman  MRN: [id number]  Admission Date: 1/13/2019  Hospital Length of Stay: 7 days  Discharge Date and Time:  01/20/2019 3:38 PM  Attending Physician: Sofiya att. providers found   Discharging Provider: Willy Malloy MD  Primary Care Provider: Primary Doctor Sofiya    [facility] Team: [facility] HOSP MED 3 Willy Malloy MD    HPI:     Mr. Friedman is a 68 year old male with a history of HTN who presented to [facility] ED for evaluation of dizziness. The patient had been seen the previous day (1/12/19) in the ED complaining of dysuria and "brown" colored urine. He endorsed testicular pain that had been present for roughly 1 week. At that time urinalysis showed WBC's and occasional bacteria, cultures are still pending. He was started on cipro for presumptive prostatitis and discharged home. The patient had two doses of cipro total at home before representing to the ED. A this time he was endorsing dizziness, fatigue, and near syncope. He endorsed fevers, chills, rigors, and diarrhea at home. The patient states that he had been having diarrhea for roughly 4 days or so prior to presenting. Patient describes watery brown stools roughly every 2 hours. He denies nausea, vomiting, or abdominal pain. The patient has had poor PO intake over those few days, eating and drinking very little because he did not want to have more diarrhea. He denies sick contacts. At this time the patient denies dysuria, hematuria, pyuria, penial discharge, scrotal pain or swelling. He states he notices a pain in his perineal area when he sits and he does endorse mild low back pain across his lower back. Patient endorsing cough for past week productive of thick white sputum as well. The patient is sexually active with one long term partner and does not use condoms. He denies history of STI. The patient states he drinks 1 pint of alcohol most days for the past 10-20 years. " His last drink was 4 days ago.     In the ED the patient was hypotensive, tachyonic, and tachycardic. Troponin was elevated and lactate was 3.4. The patient denied chest pain. He was given rocephin and 3L fluid resuscitation. Critical care was consulted for possible septic shock. The patient's initial blood pressure was 90/50 with a pulse rate of 152 and he was hypoxic (satting in the 80's). Patient's vitals improved with fluid resuscitation and supplemental oxygen. Lactate trended down to 1.6. Antibiotics were broadened to vancomycin and zosyn and C.diff was sent. Patient determined stable for floor and patient was admitted hospital medicine for further treatment of severe sepsis.        Interval History:   Patient has improved significantly with addition of lasix (discontinued), currently on RA sating at 94%.  No abscess has identified on CT A/P. He has one spike of fever at 100.8 on 01/18. Vanc and Zosyn were discontinued by ID and started on CTX and Metronidazole for 2 weeks (started on 01/18). Patient was complaining of dysphagia, MBSS (01/17) is normal. Patient still complaining of testicular and perineal pain (improved), scrotal US showed R hydrocele and L varicocele. Urology was consulted, appreciate their recs.   Pain improved. Patient stable. Will continue Ceft 2g daily and Flagyl for 2 weeks and return to ID clinic.         * No surgery found *            Review of Systems   Constitutional: Negative for diaphoresis and fatigue.   HENT: Negative for sore throat and trouble swallowing.    Respiratory: Negative for cough, chest tightness, shortness of breath and wheezing.    Cardiovascular: Negative for chest pain, palpitations and leg swelling.   Gastrointestinal: Negative for nausea and vomiting.   Genitourinary: Negative for dysuria and hematuria.        Perineal and testicular pain resolving  Musculoskeletal: Negative for arthralgias, back pain, myalgias, neck pain and neck stiffness.   Skin: Negative  for color change, pallor and wound.   Allergic/Immunologic: Negative for immunocompromised state.   Neurological: Negative for headaches. Weakness improved. No FND.  Hematological: Negative for adenopathy.   Psychiatric/Behavioral: Negative for agitation, behavioral problems and confusion.      Objective:      Vitals:    01/19/19 2300 01/20/19 0439 01/20/19 0739 01/20/19 1124   BP: (!) 129/59 117/61 130/60 120/66   BP Location: Left arm Left arm     Patient Position: Lying Lying  Sitting   Pulse: 76 73 75 77   Resp: 16 16 14 14   Temp: 97.2 °F (36.2 °C) 98.7 °F (37.1 °C) 99.1 °F (37.3 °C) 98.2 °F (36.8 °C)   TempSrc: Oral Oral  Oral   SpO2: 99% 95% (!) 92% (!) 93%   Weight:       Height:           Weight: 84.4 kg (186 lb 1.1 oz)  Body mass index is 27.48 kg/m².     Intake/Output Summary (Last 24 hours) at 1/19/2019 1137  Last data filed at 1/19/2019 0537      Gross per 24 hour   Intake --   Output 1450 ml   Net -1450 ml      Physical Exam   Constitutional: He is oriented to person, place, and time. Vital signs are normal. He appears well-developed. He is cooperative.   HENT:   Head: Normocephalic and atraumatic.   Eyes: Conjunctivae are normal. Pupils are equal, round, and reactive to light. Right eye exhibits no exudate. Left eye exhibits no exudate. Right conjunctiva has no hemorrhage. Left conjunctiva has no hemorrhage. Right eye exhibits no nystagmus. Left eye exhibits no nystagmus.   Neck: Trachea normal. No neck rigidity. No tracheal deviation present.   Cardiovascular: Normal rate and regular rhythm. Exam reveals no gallop.   No murmur heard.  Pulmonary/Chest: Effort normal. No accessory muscle usage. No tachypnea. No respiratory distress. He has no wheezes. He has no rhonchi. He has no rales.   Abdominal: Soft. Normal appearance and bowel sounds are normal. There is no tenderness.   Genitourinary:   Genitourinary Comments: Perineal pain when he sits up, improved when he walks (improved). No testicular pain  or tenderness. No open wounds or lesions at groin area.   Musculoskeletal: Normal range of motion.   Neurological: He is alert and oriented to person, place, and time. No cranial nerve deficit. GCS eye subscore is 4. GCS verbal subscore is 5. GCS motor subscore is 6.   Skin: Skin is warm and dry. Capillary refill takes 2 to 3 seconds. He is not diaphoretic. No cyanosis. Nails show no clubbing.   Psychiatric: He has a normal mood and affect. His speech is normal and behavior is normal.   Nursing note and vitals reviewed.           Consults:   Consults (From admission, onward)        Status Ordering Provider     Case Management/  Once     Provider:  (Not yet assigned)    Acknowledged YESENIA HOWARD     Inpatient consult to Critical Care Medicine  Once     Provider:  (Not yet assigned)    Completed SARANYA HERNANDEZ     Inpatient consult to Critical Care Medicine  Once     Provider:  (Not yet assigned)    Completed YESENIA HOWARD     Inpatient consult to Infectious Diseases  Once     Provider:  (Not yet assigned)    Completed TITA DAVIS     Inpatient consult to Midline team  Once     Provider:  (Not yet assigned)    Completed CARLOS SNOW     Inpatient consult to Urology  Once     Provider:  (Not yet assigned)    Completed TITA DAVIS          Final Active Diagnoses:    Diagnosis Date Noted POA    PRINCIPAL PROBLEM:  Sepsis [A41.9] 01/13/2019 Yes    Perineal pain [R10.2] 01/18/2019 Unknown    Dysphagia [R13.10] 01/16/2019 Yes    Essential hypertension [I10] 01/14/2019 Yes    Acute cystitis without hematuria [N30.00] 01/14/2019 Yes    Discharge planning issues [Z02.9] 01/14/2019 Not Applicable    Acute respiratory failure with hypoxia [J96.01] 01/14/2019 Yes      Problems Resolved During this Admission:    Diagnosis Date Noted Date Resolved POA    Hypotension [I95.9] 01/14/2019 01/18/2019 Yes    JUAN (acute kidney injury) [N17.9] 01/14/2019 01/18/2019 Yes    Elevated troponin  [R74.8] 01/13/2019 01/14/2019 Yes      Discharged Condition: good    Disposition: Home or Self Care    Follow Up:  Follow-up Information     Primary Doctor No.           Fredy Sorensen MD.    Specialty:  Infectious Diseases  Contact information:  [address]  [facility] [address]  [phone]             [facility].    Why:  Home Health  Contact information:  [address]  [facility] [address]  [phone]             [facility].    Why:  Infusion  Contact information:  [address] [facility]  [address]  [facility] [address]  [phone]                 No future appointments.    Patient Instructions:   No discharge procedures on file.  Medications:  Reconciled Home Medications:      Medication List      START taking these medications    cefTRIAXone 2 g in dextrose 5 % 50 mL 2 g/50 mL Pgbk IVPB  Commonly known as:  ROCEPHIN  Inject 50 mLs (2 g total) into the vein once daily. for 14 days     ibuprofen 600 MG tablet  Commonly known as:  ADVIL,MOTRIN  Take 1 tablet (600 mg total) by mouth 2 (two) times daily. for 7 days     metroNIDAZOLE 500 MG tablet  Commonly known as:  FLAGYL  Take 1 tablet (500 mg total) by mouth every 8 (eight) hours. for 14 days        CONTINUE taking these medications    lisinopril-hydrochlorothiazide 10-12.5 mg per tablet  Commonly known as:  PRINZIDE,ZESTORETIC  Take 1 tablet by mouth once daily.        STOP taking these medications    ciprofloxacin HCl 500 MG tablet  Commonly known as:  CIPRO            Significant Diagnostic Studies:     Recent Results (from the past 24 hour(s))   POCT glucose    Collection Time: 01/19/19  5:28 PM   Result Value Ref Range    POCT Glucose 128 (H) 70 - 110 mg/dL   POCT glucose    Collection Time: 01/19/19 10:50 PM   Result Value Ref Range    POCT Glucose 113 (H) 70 - 110 mg/dL   CBC with Automated Differential    Collection Time: 01/20/19  3:27 AM   Result Value Ref Range    WBC 14.11 (H) 3.90 - 12.70 K/uL    RBC 3.47 (L)  4.60 - 6.20 M/uL    Hemoglobin 11.7 (L) 14.0 - 18.0 g/dL    Hematocrit 34.7 (L) 40.0 - 54.0 %     (H) 82 - 98 fL    MCH 33.7 (H) 27.0 - 31.0 pg    MCHC 33.7 32.0 - 36.0 g/dL    RDW 12.7 11.5 - 14.5 %    Platelets 310 150 - 350 K/uL    MPV 10.1 9.2 - 12.9 fL    Immature Granulocytes 0.8 (H) 0.0 - 0.5 %    Gran # (ANC) 10.3 (H) 1.8 - 7.7 K/uL    Immature Grans (Abs) 0.11 (H) 0.00 - 0.04 K/uL    Lymph # 2.5 1.0 - 4.8 K/uL    Mono # 1.0 0.3 - 1.0 K/uL    Eos # 0.1 0.0 - 0.5 K/uL    Baso # 0.02 0.00 - 0.20 K/uL    nRBC 0 0 /100 WBC    Gran% 72.9 38.0 - 73.0 %    Lymph% 18.0 18.0 - 48.0 %    Mono% 7.3 4.0 - 15.0 %    Eosinophil% 0.9 0.0 - 8.0 %    Basophil% 0.1 0.0 - 1.9 %    Differential Method Automated    POCT glucose    Collection Time: 01/20/19  8:00 AM   Result Value Ref Range    POCT Glucose 86 70 - 110 mg/dL   POCT glucose    Collection Time: 01/20/19 11:57 AM   Result Value Ref Range    POCT Glucose 133 (H) 70 - 110 mg/dL     Microbiology Results (last 7 days)     Procedure Component Value Units Date/Time    Blood Culture #1 **CANNOT BE ORDERED STAT** [[id number]] Collected:  01/17/19 0630    Order Status:  Completed Specimen:  Blood Updated:  01/20/19 1012     Blood Culture, Routine No Growth to date     Blood Culture, Routine No Growth to date     Blood Culture, Routine No Growth to date     Blood Culture, Routine No Growth to date    Blood Culture #1 **CANNOT BE ORDERED STAT** [[id number]] Collected:  01/18/19 0609    Order Status:  Completed Specimen:  Blood Updated:  01/20/19 0812     Blood Culture, Routine No Growth to date     Blood Culture, Routine No Growth to date     Blood Culture, Routine No Growth to date    Blood Culture #1 **CANNOT BE ORDERED STAT** [[id number]] Collected:  01/18/19 0616    Order Status:  Completed Specimen:  Blood Updated:  01/20/19 0812     Blood Culture, Routine No Growth to date     Blood Culture, Routine No Growth to date     Blood Culture, Routine No Growth to date     Blood Culture #1 **CANNOT BE ORDERED STAT** [[id number]] Collected:  01/16/19 0416    Order Status:  Completed Specimen:  Blood Updated:  01/20/19 0812     Blood Culture, Routine No Growth to date     Blood Culture, Routine No Growth to date     Blood Culture, Routine No Growth to date     Blood Culture, Routine No Growth to date     Blood Culture, Routine No Growth to date    Blood Culture #1 **CANNOT BE ORDERED STAT** [[id number]] Collected:  01/16/19 0416    Order Status:  Completed Specimen:  Blood Updated:  01/20/19 0812     Blood Culture, Routine No Growth to date     Blood Culture, Routine No Growth to date     Blood Culture, Routine No Growth to date     Blood Culture, Routine No Growth to date     Blood Culture, Routine No Growth to date    Stool culture [[id number]] Collected:  01/16/19 1631    Order Status:  Completed Specimen:  Stool Updated:  01/19/19 1403     Stool Culture No Salmonella,Shigella,Vibrio,Campylobacter,Yersinia isolated.    Blood culture x two cultures. Draw prior to antibiotics. [[id number]] Collected:  01/13/19 1620    Order Status:  Completed Specimen:  Blood from Peripheral, Forearm, Left Updated:  01/18/19 1514     Blood Culture, Routine Gram stain odin bottle: Gram positive cocci in clusters resembling Staph     Blood Culture, Routine Gram stain odin bottle: Gram positive rods     Blood Culture, Routine Gram stain odin bottle: Gram negative rods      Blood Culture, Routine Results called to and read back by: Cecilia Wolff RN  01/15/2019       Blood Culture, Routine 05:27     Blood Culture, Routine PREVOTELLA (B.) BIVIA    Narrative:       Aerobic and anaerobic    Blood culture x two cultures. Draw prior to antibiotics. [[id number]]  (Susceptibility) Collected:  01/13/19 1627    Order Status:  Completed Specimen:  Blood from Peripheral, Upper Arm, Right Updated:  01/18/19 1246     Blood Culture, Routine Gram stain odin bottle: Gram positive cocci in clusters resembling Staph     Blood  Culture, Routine Results called to and read back by:Sandi Bill RN  01/14/2019  13:42     Blood Culture, Routine STAPHYLOCOCCUS EPIDERMIDIS    Narrative:       Aerobic and anaerobic    Blood Culture #1 **CANNOT BE ORDERED STAT** [[id number]] Collected:  01/17/19 0630    Order Status:  Sent Specimen:  Blood Updated:  01/17/19 0631    C. trachomatis/N. gonorrhoeae by AMP DNA [facility]; Urine [[id number]] Collected:  01/15/19 1850    Order Status:  Completed Specimen:  Genital Updated:  01/16/19 2216     Chlamydia, Amplified DNA Not Detected     N gonorrhoeae, amplified DNA Not Detected    Narrative:       Resulting Location->[facility]    E. coli 0157 antigen [[id number]] Collected:  01/16/19 1631    Order Status:  Canceled Specimen:  Stool Updated:  01/16/19 1856    Urine culture [[id number]] Collected:  01/13/19 1938    Order Status:  Completed Specimen:  Urine Updated:  01/15/19 0026     Urine Culture, Routine No growth    Narrative:       Preferred Collection Type->Urine, Clean Catch  yellow and grey    C. trachomatis/N. gonorrhoeae by AMP DNA [facility]; Urine [[id number]]     Order Status:  Completed Specimen:  Genital     Clostridium difficile EIA [[id number]] Collected:  01/13/19 2111    Order Status:  Completed Specimen:  Stool Updated:  01/14/19 0255     C. diff Antigen Negative     C difficile Toxins A+B, EIA Negative     Comment: Testing not recommended for children <24 months old.           Imaging Results          US Scrotum And Testicles (Final result)  Result time 01/15/19 09:41:37    Final result by Mandeep Calderon MD (01/15/19 09:41:37)                 Impression:      No acute testicular pathology.    Right hydrocele and left varicocele present.    Small scrotal alejandro.    Small left epididymal head cysts.    Electronically signed by resident: Jose Sanchez  Date:    01/15/2019  Time:    09:28    Electronically signed by: Mandeep Calderon MD  Date:    01/15/2019  Time:    09:41             Narrative:     EXAMINATION:  US SCROTUM AND TESTICLES    CLINICAL HISTORY:  scrotal pain;    TECHNIQUE:  Sonography of the scrotum and testes.    COMPARISON:  None.    FINDINGS:  Right Testicle:    *Size: 3.6 x 2.4 x 3.1 cm  *Appearance: Normal.  Note is made of a 5 mm echogenic focus adjacent to the right testicle consistent with a scrotal alejandro.  *Flow: Normal arterial and venous flow  *Epididymis: Normal.  *Hydrocele: Present.  *Varicocele: None.  Left Testicle:    *Size: 3.8 x 2.2 x 3.0 cm  *Appearance: Normal.  *Flow: Normal arterial and venous flow  *Epididymis: 4 mm epididymal head cyst.  *Hydrocele: None.  *Varicocele: Present.  Other findings: None.                               CT Abdomen Pelvis  Without Contrast (Final result)  Result time 01/14/19 12:56:29    Final result by Aristides Riley MD (01/14/19 12:56:29)                 Impression:      1. No acute intra-abdominal/pelvic CT abnormalities to account for the reported history of sepsis.  2. Right basilar subsegmental atelectasis or consolidation with trace associated dependent pleural fluid.  3. Cholelithiasis.  4. Subcentimeter hypodensity within the right hepatic dome, too small to accurately characterize.      Electronically signed by: Aristides Riley MD  Date:    01/14/2019  Time:    12:56             Narrative:    EXAMINATION:  CT ABDOMEN PELVIS WITHOUT CONTRAST    CLINICAL HISTORY:  Sepsis;    TECHNIQUE:  5 mm axial images were obtained through the abdomen and pelvis without IV contrast.  Coronal and sagittal reformats were performed.    COMPARISON:  None.    FINDINGS:  Visualized heart demonstrates trace coronary artery atherosclerosis.  No pericardial effusion.    Visualized lungs demonstrate right basilar subsegmental atelectasis or consolidation with trace dependent pleural fluid.    The liver is at the upper limit of normal in size.  There is a 0.6 cm low-attenuation lesion within the right dome, too small to accurately characterize.  No  intrahepatic bile duct dilatation.    Multiple calcified stones are noted within the gallbladder lumen.  No surrounding inflammatory changes.  Common bile duct is normal in caliber.    Stomach, duodenum, spleen, pancreas and adrenal glands are within normal limits.    Kidneys are normal in size and location.  No contour deforming renal masses.  No nephrolithiasis.  No hydronephrosis or hydroureter.  Bladder is fluid filled and distended.  Prostate demonstrates several scattered calcifications.    The small bowel is normal in caliber.  Colon demonstrates no significant abnormalities.  The appendix is normal.    No ascites or intra-abdominal free air.  No abdominal or pelvic lymph node enlargement.  No focal mesenteric masses.    The aorta tapers normally with moderate atherosclerotic calcification.    There is a fat containing supraumbilical, anterior midline abdominal wall hernia.    Degenerative changes of the spine noted.  No acute fractures or osseous destruction.                               X-Ray Chest AP Portable (Final result)  Result time 01/14/19 05:50:20    Final result by Jimmie Lamb MD (01/14/19 05:50:20)                 Impression:      As above.      Electronically signed by: Jimmie Lamb MD  Date:    01/14/2019  Time:    05:50             Narrative:    EXAMINATION:  XR CHEST AP PORTABLE    CLINICAL HISTORY:  respiratory distress;    TECHNIQUE:  Single frontal view of the chest was performed.    COMPARISON:  Chest radiograph 01/13/2019    FINDINGS:  Cardiac monitoring leads overlie the chest.  The cardiomediastinal silhouette is enlarged.  There are mild bilateral interstitial opacities, more conspicuous compared to prior examination.  This could relate to mild interstitial edema or atypical interstitial infection.  No evidence of large pleural effusion or pneumothorax.  Visualized osseous structures are intact.                               X-Ray Chest AP Portable (Final result)  Result  time 01/13/19 16:37:42    Final result by Jose M Armijo Jr., MD (01/13/19 16:37:42)                 Impression:      No detrimental change.  No acute abnormality.      Electronically signed by: Jose M Armijo MD  Date:    01/13/2019  Time:    16:37             Narrative:    EXAMINATION:  XR CHEST AP PORTABLE    CLINICAL HISTORY:  Sepsis;    TECHNIQUE:  Single frontal view of the chest was performed.    COMPARISON:  December 2012.    FINDINGS:  Heart size and pulmonary vessels are similar.  The lungs fairly well aerated.  Few increased markings at the bases unchanged.                                  Pending Diagnostic Studies:     Procedure Component Value Units Date/Time    H. pylori antigen, stool [[id number]] Collected:  01/16/19 1630    Order Status:  Sent Lab Status:  In process Updated:  01/16/19 6147    Specimen:  Stool         Indwelling Lines/Drains at time of discharge:   Lines/Drains/Airways          None          Time spent on the discharge of patient: 60 minutes  Patient was seen and examined on the date of discharge and determined to be suitable for discharge.       Sepsis     68 year old male presents with fevers, chills, rigors, diarrhea, dizziness, and perineal pain and found to be septic in the ER as he was febrile, tachycardic, hypotensive, hypoxic, with lactic acid of 3.4 and leukocytosis; CT abdomen/pelvis negative; ultrasound of scrotum negative; no signs of cellulitis on exam. pt started on empiric antibiotics and has improved; afebrile  - urine culture negative; c diff negative  - blood cultures in 1 set positive for MSSE; other set with prevotella biva  - pt developed fever blisters while inpatient; treated with valtrex  - GC/chlamydia negative     Patient has mild leukocytosis of 14, stable; pt afebrile. 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  Urology has evaluated and and finds no abnormality concerning for infection on exam or imaging. Today patient reports pain is minimal - 1/10.  Much improved from admit     Plan:  1. Continue rocephin 2 gram IV q 24 hours and flagyl 500 mg PO TID X 14 days.  End date 2/2/19  2.  Weekly cbc, cmp and fax result to ID at [phone]  3.  ID follow up prior to end of therapy           Willy Malloy MD  Department of Hospital Medicine  [facility]

## 2019-01-20 NOTE — SUBJECTIVE & OBJECTIVE
Interval History: pt afebrile. Reports pain is much improved - was 10/10 on admit now a 1/10.  WBC stable at 14.  Urology has evaluated - prostate exam benign, no drainable fluid collection or palpable abscess or abnormality concerning for infection on exam or imaging.  Agrees with antibiotics  Currently on IV ceftriaxone and flagyl.   Anticipate 2 weeks of antibiotics.   Anticipating discharge home today.     . Review of Systems   Constitutional: Negative for chills, diaphoresis, fatigue and fever.   HENT:        (+) fever blisters   Respiratory: Negative for cough and shortness of breath.    Cardiovascular: Negative for chest pain and leg swelling.   Gastrointestinal: Negative for abdominal pain, constipation, nausea and vomiting.   Genitourinary: Positive for testicular pain (minimal - much improved). Negative for difficulty urinating, dysuria, flank pain, frequency, hematuria, scrotal swelling and urgency.   Musculoskeletal: Positive for back pain. Negative for myalgias.   Skin: Negative for rash and wound.   Neurological: Negative for dizziness, light-headedness and headaches.   Psychiatric/Behavioral: Negative for agitation and behavioral problems. The patient is not nervous/anxious.      Objective:     Vital Signs (Most Recent):  Temp: 99.1 °F (37.3 °C) (01/20/19 0739)  Pulse: 75 (01/20/19 0739)  Resp: 14 (01/20/19 0739)  BP: 130/60 (01/20/19 0739)  SpO2: (!) 92 % (01/20/19 0739) Vital Signs (24h Range):  Temp:  [97.2 °F (36.2 °C)-99.2 °F (37.3 °C)] 99.1 °F (37.3 °C)  Pulse:  [73-98] 75  Resp:  [14-20] 14  SpO2:  [91 %-99 %] 92 %  BP: (114-130)/(58-61) 130/60     Weight: 84.4 kg (186 lb 1.1 oz)  Body mass index is 27.48 kg/m².    Estimated Creatinine Clearance: 90.8 mL/min (based on SCr of 0.8 mg/dL).    Physical Exam   Constitutional: He is oriented to person, place, and time. He appears well-developed and well-nourished. No distress.   HENT:   Fever blisters noted to bottom lip   Cardiovascular: Normal rate  and regular rhythm.   No murmur heard.  Pulmonary/Chest: Effort normal and breath sounds normal. No respiratory distress.   Abdominal: Soft. Bowel sounds are normal. He exhibits no distension.   Genitourinary:   Genitourinary Comments: Minimal tenderness to palpation/pressure perineal area.   No scrotal/penile lesions noted.  No erythema, warmth   Musculoskeletal: Normal range of motion. He exhibits no edema.   Neurological: He is alert and oriented to person, place, and time.   Skin: Skin is warm and dry.   Psychiatric: He has a normal mood and affect. His behavior is normal.       Significant Labs:   Blood Culture:   Recent Labs   Lab 01/15/19  1202 01/16/19  0416 01/17/19  0630 01/18/19  0609 01/18/19  0616   LABBLOO No Growth to date  No Growth to date  No Growth to date  No Growth to date  No Growth to date  No Growth to date  No Growth to date  No Growth to date  No Growth to date  No Growth to date No Growth to date  No Growth to date  No Growth to date  No Growth to date  No Growth to date  No Growth to date  No Growth to date  No Growth to date  No Growth to date  No Growth to date No Growth to date  No Growth to date  No Growth to date No Growth to date  No Growth to date  No Growth to date No Growth to date  No Growth to date  No Growth to date     CBC:   Recent Labs   Lab 01/19/19  1126 01/20/19  0327   WBC 14.46* 14.11*   HGB 12.4* 11.7*   HCT 36.9* 34.7*    310     CMP:   Recent Labs   Lab 01/19/19  0345      K 3.5      CO2 23   GLU 92   BUN 12   CREATININE 0.8   CALCIUM 8.5*   ANIONGAP 7*   EGFRNONAA >60.0     Urine Culture:   Recent Labs   Lab 01/12/19  2150 01/13/19  1938   LABURIN No growth No growth     Urine Studies:   Recent Labs   Lab 01/13/19 1938   COLORU Yellow   APPEARANCEUA Hazy*   PHUR 5.0   SPECGRAV 1.010   PROTEINUA Negative   GLUCUA Negative   KETONESU Negative   BILIRUBINUA Negative   OCCULTUA 1+*   NITRITE Negative   LEUKOCYTESUR 1+*    RBCUA 7*   WBCUA 17*   BACTERIA Rare   SQUAMEPITHEL 4     Wound Culture: No results for input(s): LABAERO in the last 4320 hours.    Significant Imaging: I have reviewed all pertinent imaging results/findings within the past 24 hours.

## 2019-01-21 LAB
BACTERIA BLD CULT: NORMAL

## 2019-01-22 LAB
BACTERIA BLD CULT: NORMAL
H PYLORI AG STL QL IA: NOT DETECTED

## 2019-01-23 ENCOUNTER — TELEPHONE (OUTPATIENT)
Dept: INFECTIOUS DISEASES | Facility: CLINIC | Age: 69
End: 2019-01-23

## 2019-01-23 LAB
BACTERIA BLD CULT: NORMAL
BACTERIA BLD CULT: NORMAL

## 2019-01-23 NOTE — TELEPHONE ENCOUNTER
Spoke to patient emergency contact when mobile number was not in service to let us know if any problems with appointment moving to day before.

## 2019-01-29 ENCOUNTER — OFFICE VISIT (OUTPATIENT)
Dept: INFECTIOUS DISEASES | Facility: CLINIC | Age: 69
End: 2019-01-29
Payer: MEDICARE

## 2019-01-29 VITALS
HEART RATE: 78 BPM | WEIGHT: 184.94 LBS | HEIGHT: 69 IN | SYSTOLIC BLOOD PRESSURE: 104 MMHG | DIASTOLIC BLOOD PRESSURE: 63 MMHG | BODY MASS INDEX: 27.39 KG/M2 | TEMPERATURE: 98 F

## 2019-01-29 DIAGNOSIS — R10.2 PERINEAL PAIN: Primary | ICD-10-CM

## 2019-01-29 PROCEDURE — 1101F PT FALLS ASSESS-DOCD LE1/YR: CPT | Mod: CPTII,S$GLB,, | Performed by: INTERNAL MEDICINE

## 2019-01-29 PROCEDURE — 99999 PR PBB SHADOW E&M-EST. PATIENT-LVL III: CPT | Mod: PBBFAC,,, | Performed by: INTERNAL MEDICINE

## 2019-01-29 PROCEDURE — 1101F PR PT FALLS ASSESS DOC 0-1 FALLS W/OUT INJ PAST YR: ICD-10-PCS | Mod: CPTII,S$GLB,, | Performed by: INTERNAL MEDICINE

## 2019-01-29 PROCEDURE — 99214 PR OFFICE/OUTPT VISIT, EST, LEVL IV, 30-39 MIN: ICD-10-PCS | Mod: S$GLB,,, | Performed by: INTERNAL MEDICINE

## 2019-01-29 PROCEDURE — 99214 OFFICE O/P EST MOD 30 MIN: CPT | Mod: S$GLB,,, | Performed by: INTERNAL MEDICINE

## 2019-01-29 PROCEDURE — 99999 PR PBB SHADOW E&M-EST. PATIENT-LVL III: ICD-10-PCS | Mod: PBBFAC,,, | Performed by: INTERNAL MEDICINE

## 2019-01-29 NOTE — PROGRESS NOTES
Infectious Diseases Clinic Note    Subjective:       Patient ID: Brian Friedman is a 68 y.o. male.    Chief Complaint: No chief complaint on file.    HPI     Pt states doing very well and scrotal pain is completely resolved, no issues with PICC, no f/c/n/d/    Past Medical History:   Diagnosis Date    Essential hypertension 1/14/2019       Social History     Socioeconomic History    Marital status: Single     Spouse name: Not on file    Number of children: Not on file    Years of education: Not on file    Highest education level: Not on file   Social Needs    Financial resource strain: Not on file    Food insecurity - worry: Not on file    Food insecurity - inability: Not on file    Transportation needs - medical: Not on file    Transportation needs - non-medical: Not on file   Occupational History    Not on file   Tobacco Use    Smoking status: Current Every Day Smoker     Packs/day: 0.50    Smokeless tobacco: Never Used   Substance and Sexual Activity    Alcohol use: Yes     Alcohol/week: 6.0 oz     Types: 10 Cans of beer per week    Drug use: No    Sexual activity: Not on file   Other Topics Concern    Not on file   Social History Narrative    Not on file         Current Outpatient Medications:     cefTRIAXone 2 g in dextrose 5 % 50 mL (ROCEPHIN) 2 g/50 mL PgBk IVPB, Inject 50 mLs (2 g total) into the vein once daily. for 14 days, Disp: 14 each, Rfl: 0    lisinopril-hydrochlorothiazide (PRINZIDE,ZESTORETIC) 10-12.5 mg per tablet, Take 1 tablet by mouth once daily., Disp: , Rfl:     metroNIDAZOLE (FLAGYL) 500 MG tablet, Take 1 tablet (500 mg total) by mouth every 8 (eight) hours. for 14 days, Disp: 42 tablet, Rfl: 0    Review of Systems   Constitutional: Negative for activity change, appetite change, chills, fatigue and fever.   HENT: Negative for congestion, dental problem, mouth sores and sinus pressure.    Eyes: Negative for pain, redness and visual disturbance.   Respiratory: Negative for  cough, shortness of breath and wheezing.    Cardiovascular: Negative for chest pain and leg swelling.   Gastrointestinal: Negative for abdominal distention, abdominal pain, diarrhea, nausea and vomiting.   Endocrine: Negative for polyuria.   Genitourinary: Negative for decreased urine volume, dysuria and frequency.   Musculoskeletal: Negative for joint swelling.   Skin: Negative for color change.   Allergic/Immunologic: Negative for food allergies.   Neurological: Negative for dizziness, weakness and headaches.   Hematological: Negative for adenopathy.   Psychiatric/Behavioral: Negative for agitation and confusion. The patient is not nervous/anxious.            Objective:       Vitals:    01/29/19 1512   BP: 104/63   Pulse: 78   Temp: 97.5 °F (36.4 °C)       There were no vitals filed for this visit.  Physical Exam   Constitutional: He is oriented to person, place, and time. He appears well-developed and well-nourished.   HENT:   Head: Normocephalic and atraumatic.   Mouth/Throat: Oropharynx is clear and moist.   Eyes: Conjunctivae are normal.   Neck: Neck supple.   Cardiovascular: Normal rate, regular rhythm and normal heart sounds.   No murmur heard.  Pulmonary/Chest: Effort normal and breath sounds normal. No respiratory distress. He has no wheezes.   Abdominal: Soft. Bowel sounds are normal. He exhibits no distension. There is no tenderness.   Genitourinary: Penis normal.   Genitourinary Comments: No TTP   Musculoskeletal: Normal range of motion. He exhibits no edema or tenderness.   Lymphadenopathy:     He has no cervical adenopathy.   Neurological: He is alert and oriented to person, place, and time. Coordination normal.   Skin: Skin is warm and dry. No rash noted.   Psychiatric: He has a normal mood and affect. His behavior is normal.           Assessment/Plan:       No diagnosis found.      67 y/o M h/o HTN presented 1/13 to ER with rigors and scrotal pain with lactic acidosis and bcx with MSSE, prevotella  bivia with response to IV abx but overall clinical picture initially concerning for fourniers gangrene though imaging and exam were not consistent with this.  He improved and was sent home with IV ceftriaxone and metronidazole x 14 days planned end date 2/2/19 here for f/u doing very well with pain completely resolved  - finish 2 week course 2/2 and pull line at that time  - follow up as needed  - pt and partner understand to call if any issues

## 2019-02-08 ENCOUNTER — TELEPHONE (OUTPATIENT)
Dept: ADMINISTRATIVE | Facility: CLINIC | Age: 69
End: 2019-02-08

## 2019-02-08 NOTE — TELEPHONE ENCOUNTER
Home Health SOC 01/21/2019 - 03/21/2019 with [facility] ([place] - Dr. Omega Lea. Patient received SN and PT services.

## 2019-12-31 NOTE — ASSESSMENT & PLAN NOTE
Patient lives at home with another family member, able to complete ADL's without difficulty    - PT/OT consulted  - Social work consulted     Price (Do Not Change): 0.00 Instructions: This plan will send the code FBSD to the PM system.  DO NOT or CHANGE the price. Detail Level: Simple

## 2021-03-12 ENCOUNTER — IMMUNIZATION (OUTPATIENT)
Dept: PRIMARY CARE CLINIC | Facility: CLINIC | Age: 71
End: 2021-03-12
Payer: MEDICARE

## 2021-03-12 DIAGNOSIS — Z23 NEED FOR VACCINATION: Primary | ICD-10-CM

## 2021-03-12 PROCEDURE — 0001A PR IMMUNIZ ADMIN, SARS-COV-2 COVID-19 VACC, 30MCG/0.3ML, 1ST DOSE: CPT | Mod: CV19,S$GLB,, | Performed by: INTERNAL MEDICINE

## 2021-03-12 PROCEDURE — 91300 PR SARS-COV- 2 COVID-19 VACCINE, NO PRSV, 30MCG/0.3ML, IM: ICD-10-PCS | Mod: S$GLB,,, | Performed by: INTERNAL MEDICINE

## 2021-03-12 PROCEDURE — 0001A PR IMMUNIZ ADMIN, SARS-COV-2 COVID-19 VACC, 30MCG/0.3ML, 1ST DOSE: ICD-10-PCS | Mod: CV19,S$GLB,, | Performed by: INTERNAL MEDICINE

## 2021-03-12 PROCEDURE — 91300 PR SARS-COV- 2 COVID-19 VACCINE, NO PRSV, 30MCG/0.3ML, IM: CPT | Mod: S$GLB,,, | Performed by: INTERNAL MEDICINE

## 2021-03-12 RX ADMIN — Medication 0.3 ML: at 11:03

## 2021-04-02 ENCOUNTER — IMMUNIZATION (OUTPATIENT)
Dept: PRIMARY CARE CLINIC | Facility: CLINIC | Age: 71
End: 2021-04-02
Payer: MEDICARE

## 2021-04-02 DIAGNOSIS — Z23 NEED FOR VACCINATION: Primary | ICD-10-CM

## 2021-04-02 PROCEDURE — 0002A PR IMMUNIZ ADMIN, SARS-COV-2 COVID-19 VACC, 30MCG/0.3ML, 2ND DOSE: CPT | Mod: CV19,S$GLB,, | Performed by: INTERNAL MEDICINE

## 2021-04-02 PROCEDURE — 91300 PR SARS-COV- 2 COVID-19 VACCINE, NO PRSV, 30MCG/0.3ML, IM: ICD-10-PCS | Mod: S$GLB,,, | Performed by: INTERNAL MEDICINE

## 2021-04-02 PROCEDURE — 91300 PR SARS-COV- 2 COVID-19 VACCINE, NO PRSV, 30MCG/0.3ML, IM: CPT | Mod: S$GLB,,, | Performed by: INTERNAL MEDICINE

## 2021-04-02 PROCEDURE — 0002A PR IMMUNIZ ADMIN, SARS-COV-2 COVID-19 VACC, 30MCG/0.3ML, 2ND DOSE: ICD-10-PCS | Mod: CV19,S$GLB,, | Performed by: INTERNAL MEDICINE

## 2021-04-02 RX ADMIN — Medication 0.3 ML: at 12:04

## 2021-11-01 ENCOUNTER — IMMUNIZATION (OUTPATIENT)
Dept: PRIMARY CARE CLINIC | Facility: CLINIC | Age: 71
End: 2021-11-01
Payer: MEDICARE

## 2021-11-01 DIAGNOSIS — Z23 NEED FOR VACCINATION: Primary | ICD-10-CM

## 2021-11-01 PROCEDURE — 0004A COVID-19, MRNA, LNP-S, PF, 30 MCG/0.3 ML DOSE VACCINE: CPT | Mod: S$GLB,,, | Performed by: INTERNAL MEDICINE

## 2021-11-01 PROCEDURE — 91300 COVID-19, MRNA, LNP-S, PF, 30 MCG/0.3 ML DOSE VACCINE: CPT | Mod: PBBFAC | Performed by: INTERNAL MEDICINE

## 2021-11-01 PROCEDURE — 0004A COVID-19, MRNA, LNP-S, PF, 30 MCG/0.3 ML DOSE VACCINE: ICD-10-PCS | Mod: S$GLB,,, | Performed by: INTERNAL MEDICINE

## 2022-06-02 DIAGNOSIS — Z12.11 SPECIAL SCREENING FOR MALIGNANT NEOPLASMS, COLON: Primary | ICD-10-CM

## 2022-06-02 DIAGNOSIS — R19.5 FECAL OCCULT BLOOD TEST POSITIVE: Primary | ICD-10-CM

## 2022-06-02 RX ORDER — POLYETHYLENE GLYCOL 3350, SODIUM SULFATE ANHYDROUS, SODIUM BICARBONATE, SODIUM CHLORIDE, POTASSIUM CHLORIDE 236; 22.74; 6.74; 5.86; 2.97 G/4L; G/4L; G/4L; G/4L; G/4L
4 POWDER, FOR SOLUTION ORAL ONCE
Qty: 4000 ML | Refills: 0 | Status: SHIPPED | OUTPATIENT
Start: 2022-06-02 | End: 2022-06-02

## 2022-06-15 ENCOUNTER — ANESTHESIA EVENT (OUTPATIENT)
Dept: ENDOSCOPY | Facility: HOSPITAL | Age: 72
End: 2022-06-15
Payer: MEDICARE

## 2022-06-15 ENCOUNTER — ANESTHESIA (OUTPATIENT)
Dept: ENDOSCOPY | Facility: HOSPITAL | Age: 72
End: 2022-06-15
Payer: MEDICARE

## 2022-06-15 ENCOUNTER — HOSPITAL ENCOUNTER (OUTPATIENT)
Facility: HOSPITAL | Age: 72
Discharge: HOME | End: 2022-06-15
Attending: INTERNAL MEDICINE | Admitting: INTERNAL MEDICINE
Payer: MEDICARE

## 2022-06-15 VITALS
DIASTOLIC BLOOD PRESSURE: 75 MMHG | OXYGEN SATURATION: 95 % | BODY MASS INDEX: 27.4 KG/M2 | WEIGHT: 185 LBS | RESPIRATION RATE: 18 BRPM | SYSTOLIC BLOOD PRESSURE: 156 MMHG | HEART RATE: 61 BPM | HEIGHT: 69 IN | TEMPERATURE: 98 F

## 2022-06-15 DIAGNOSIS — Z12.12 ENCOUNTER FOR COLORECTAL CANCER SCREENING: Primary | ICD-10-CM

## 2022-06-15 DIAGNOSIS — Z12.11 ENCOUNTER FOR COLORECTAL CANCER SCREENING: Primary | ICD-10-CM

## 2022-06-15 DIAGNOSIS — Z12.11 COLON CANCER SCREENING: ICD-10-CM

## 2022-06-15 PROCEDURE — 27201089 HC SNARE, DISP (ANY): Performed by: INTERNAL MEDICINE

## 2022-06-15 PROCEDURE — 37000008 HC ANESTHESIA 1ST 15 MINUTES: Performed by: INTERNAL MEDICINE

## 2022-06-15 PROCEDURE — 88305 TISSUE EXAM BY PATHOLOGIST: CPT | Performed by: PATHOLOGY

## 2022-06-15 PROCEDURE — D9220A PRA ANESTHESIA: ICD-10-PCS | Mod: PT,ANES,, | Performed by: ANESTHESIOLOGY

## 2022-06-15 PROCEDURE — D9220A PRA ANESTHESIA: ICD-10-PCS | Mod: PT,CRNA,, | Performed by: NURSE ANESTHETIST, CERTIFIED REGISTERED

## 2022-06-15 PROCEDURE — 25000003 PHARM REV CODE 250: Performed by: INTERNAL MEDICINE

## 2022-06-15 PROCEDURE — 88305 TISSUE EXAM BY PATHOLOGIST: ICD-10-PCS | Mod: 26,,, | Performed by: PATHOLOGY

## 2022-06-15 PROCEDURE — D9220A PRA ANESTHESIA: Mod: PT,ANES,, | Performed by: ANESTHESIOLOGY

## 2022-06-15 PROCEDURE — D9220A PRA ANESTHESIA: Mod: PT,CRNA,, | Performed by: NURSE ANESTHETIST, CERTIFIED REGISTERED

## 2022-06-15 PROCEDURE — 25000003 PHARM REV CODE 250: Performed by: NURSE ANESTHETIST, CERTIFIED REGISTERED

## 2022-06-15 PROCEDURE — 63600175 PHARM REV CODE 636 W HCPCS: Performed by: NURSE ANESTHETIST, CERTIFIED REGISTERED

## 2022-06-15 PROCEDURE — 37000009 HC ANESTHESIA EA ADD 15 MINS: Performed by: INTERNAL MEDICINE

## 2022-06-15 PROCEDURE — 88305 TISSUE EXAM BY PATHOLOGIST: CPT | Mod: 26,,, | Performed by: PATHOLOGY

## 2022-06-15 PROCEDURE — 45385 COLONOSCOPY W/LESION REMOVAL: CPT | Mod: PT | Performed by: INTERNAL MEDICINE

## 2022-06-15 PROCEDURE — 45385 COLONOSCOPY W/LESION REMOVAL: CPT | Mod: PT,,, | Performed by: INTERNAL MEDICINE

## 2022-06-15 PROCEDURE — 45385 PR COLONOSCOPY,REMV LESN,SNARE: ICD-10-PCS | Mod: PT,,, | Performed by: INTERNAL MEDICINE

## 2022-06-15 RX ORDER — SODIUM CHLORIDE 9 MG/ML
INJECTION, SOLUTION INTRAVENOUS CONTINUOUS
Status: DISCONTINUED | OUTPATIENT
Start: 2022-06-15 | End: 2022-06-15 | Stop reason: HOSPADM

## 2022-06-15 RX ORDER — PROPOFOL 10 MG/ML
VIAL (ML) INTRAVENOUS CONTINUOUS PRN
Status: DISCONTINUED | OUTPATIENT
Start: 2022-06-15 | End: 2022-06-15

## 2022-06-15 RX ORDER — LIDOCAINE HCL/PF 100 MG/5ML
SYRINGE (ML) INTRAVENOUS
Status: DISCONTINUED | OUTPATIENT
Start: 2022-06-15 | End: 2022-06-15

## 2022-06-15 RX ORDER — PROCHLORPERAZINE EDISYLATE 5 MG/ML
5 INJECTION INTRAMUSCULAR; INTRAVENOUS EVERY 30 MIN PRN
Status: DISCONTINUED | OUTPATIENT
Start: 2022-06-15 | End: 2022-06-15 | Stop reason: HOSPADM

## 2022-06-15 RX ORDER — PROPOFOL 10 MG/ML
VIAL (ML) INTRAVENOUS
Status: DISCONTINUED | OUTPATIENT
Start: 2022-06-15 | End: 2022-06-15

## 2022-06-15 RX ORDER — FENTANYL CITRATE 50 UG/ML
25 INJECTION, SOLUTION INTRAMUSCULAR; INTRAVENOUS EVERY 5 MIN PRN
Status: DISCONTINUED | OUTPATIENT
Start: 2022-06-15 | End: 2022-06-15 | Stop reason: HOSPADM

## 2022-06-15 RX ADMIN — Medication 100 MG: at 08:06

## 2022-06-15 RX ADMIN — SODIUM CHLORIDE: 0.9 INJECTION, SOLUTION INTRAVENOUS at 08:06

## 2022-06-15 RX ADMIN — PROPOFOL 175 MCG/KG/MIN: 10 INJECTION, EMULSION INTRAVENOUS at 08:06

## 2022-06-15 RX ADMIN — PROPOFOL 100 MG: 10 INJECTION, EMULSION INTRAVENOUS at 08:06

## 2022-06-15 NOTE — H&P
Short Stay Endoscopy History and Physical    PCP - Va Martínez MD  Referring Physician - Cesario Zaidi MD  [address] [place] [address]    Procedure - colonoscopy  ASA - per anesthesia  Mallampati - per anesthesia  History of Anesthesia problems - no  Family history Anesthesia problems -  no   Plan of anesthesia - General    HPI:  This is a 72 y.o. male here for evaluation of: screening    Reflux - no  Dysphagia - no  Abdominal pain - no  Diarrhea - no    ROS:  Constitutional: No fevers, chills, No weight loss  CV: No chest pain  Pulm: No cough, No shortness of breath  Ophtho: No vision changes  GI: see HPI  Derm: No rash    Medical History:  has a past medical history of Essential hypertension (1/14/2019).    Surgical History:  has a past surgical history that includes Skin graft.    Family History: family history is not on file..    Social History:  reports that he has been smoking cigarettes. He has been smoking about 0.50 packs per day. He has never used smokeless tobacco. He reports current alcohol use of about 10.0 standard drinks of alcohol per week. He reports that he does not use drugs.    Review of patient's allergies indicates:  No Known Allergies    Medications:   Medications Prior to Admission   Medication Sig Dispense Refill Last Dose    lisinopril-hydrochlorothiazide (PRINZIDE,ZESTORETIC) 10-12.5 mg per tablet Take 1 tablet by mouth once daily.   6/14/2022 at Unknown time       Physical Exam:    Vital Signs:   Vitals:    06/15/22 0813   BP: (!) 152/72   Pulse: 63   Resp: 16   Temp: 98.1 °F (36.7 °C)       General Appearance: Well appearing in no acute distress    Labs:  Lab Results   Component Value Date    WBC 14.11 (H) 01/20/2019    HGB 11.7 (L) 01/20/2019    HCT 34.7 (L) 01/20/2019     01/20/2019    CHOL 217 (H) 04/09/2008    TRIG 190 (H) 04/09/2008    HDL 32 (L) 04/09/2008    ALT 16 01/18/2019    AST 18 01/18/2019     01/19/2019    K 3.5  01/19/2019     01/19/2019    CREATININE 0.8 01/19/2019    BUN 12 01/19/2019    CO2 23 01/19/2019    TSH 0.932 01/14/2019    PSA 0.3 04/09/2008    INR 1.2 01/13/2019    HGBA1C 5.0 01/14/2019       I have explained the risks and benefits of this endoscopic procedure to the patient including but not limited to bleeding, inflammation, infection, perforation, and death.      Serg Turner MD

## 2022-06-15 NOTE — PROVATION PATIENT INSTRUCTIONS
Discharge Summary/Instructions after an Endoscopic Procedure  Patient Name: Brian Friedman  Patient MRN: [id number]  Patient YOB: 1950  Wednesday, Donna 15, 2022  Serg Turner MD  Dear patient,  As a result of recent federal legislation (The Federal Cures Act), you may   receive lab or pathology results from your procedure in your [facility]   account before your physician is able to contact you. Your physician or   their representative will relay the results to you with their   recommendations at their soonest availability.  Thank you,  RESTRICTIONS:  During your procedure today, you received medications for sedation.  These   medications may affect your judgment, balance and coordination.  Therefore,   for 24 hours, you have the following restrictions:   - DO NOT drive a car, operate machinery, make legal/financial decisions,   sign important papers or drink alcohol.    ACTIVITY:  Today: no heavy lifting, straining or running due to procedural   sedation/anesthesia.  The following day: return to full activity including work.  DIET:  Eat and drink normally unless instructed otherwise.     TREATMENT FOR COMMON SIDE EFFECTS:  - Mild abdominal pain, nausea, belching, bloating or excessive gas:  rest,   eat lightly and use a heating pad.  - Sore Throat: treat with throat lozenges and/or gargle with warm salt   water.  - Because air was used during the procedure, expelling large amounts of air   from your rectum or belching is normal.  - If a bowel prep was taken, you may not have a bowel movement for 1-3 days.    This is normal.  SYMPTOMS TO WATCH FOR AND REPORT TO YOUR PHYSICIAN:  1. Abdominal pain or bloating, other than gas cramps.  2. Chest pain.  3. Back pain.  4. Signs of infection such as: chills or fever occurring within 24 hours   after the procedure.  5. Rectal bleeding, which would show as bright red, maroon, or black stools.   (A tablespoon of blood from the rectum is not serious, especially if    hemorrhoids are present.)  6. Vomiting.  7. Weakness or dizziness.  GO DIRECTLY TO THE NEAREST EMERGENCY ROOM IF YOU HAVE ANY OF THE FOLLOWING:      Difficulty breathing              Chills and/or fever over 101 F   Persistent vomiting and/or vomiting blood   Severe abdominal pain   Severe chest pain   Black, tarry stools   Bleeding- more than one tablespoon   Any other symptom or condition that you feel may need urgent attention  Your doctor recommends these additional instructions:  If any biopsies were taken, your doctors clinic will contact you in 1 to 2   weeks with any results.  - Discharge patient to home.   - Resume previous diet.   - Continue present medications.   - Await pathology results.   - Repeat colonoscopy in 3 years for surveillance.   - Return to primary care physician at appointment to be scheduled.  For questions, problems or results please call your physician - Serg Turner MD at Work:  [phone].  [facility], EMERGENCY ROOM PHONE NUMBER: [phone]  IF A COMPLICATION OR EMERGENCY SITUATION ARISES AND YOU ARE UNABLE TO REACH   YOUR PHYSICIAN - GO DIRECTLY TO THE EMERGENCY ROOM.  Serg Turner MD  6/15/2022 9:08:21 AM  This report has been verified and signed electronically.  Dear patient,  As a result of recent federal legislation (The Federal Cures Act), you may   receive lab or pathology results from your procedure in your [facility]   account before your physician is able to contact you. Your physician or   their representative will relay the results to you with their   recommendations at their soonest availability.  Thank you,  PROVATION

## 2022-06-15 NOTE — ANESTHESIA POSTPROCEDURE EVALUATION
Anesthesia Post Evaluation    Patient: Brian Friedman    Procedure(s) Performed: Procedure(s) (LRB):  COLONOSCOPY (N/A)    Final Anesthesia Type: general      Patient location during evaluation: [facility]  Patient participation: Yes- Able to Participate  Level of consciousness: awake and alert  Post-procedure vital signs: reviewed and stable  Pain management: adequate  Airway patency: patent    PONV status at discharge: No PONV  Anesthetic complications: no      Cardiovascular status: blood pressure returned to baseline  Respiratory status: unassisted, spontaneous ventilation and room air  Hydration status: euvolemic            Vitals Value Taken Time   /71 06/15/22 0932   Temp 36.4 °C (97.5 °F) 06/15/22 0912   Pulse 59 06/15/22 0942   Resp 14 06/15/22 0930   SpO2 93 % 06/15/22 0942   Vitals shown include unvalidated device data.      No case tracking events are documented in the log.      Pain/Carmelo Score: Carmelo Score: 10 (6/15/2022  9:20 AM)

## 2022-06-15 NOTE — TRANSFER OF CARE
"Anesthesia Transfer of Care Note    Patient: Brian Friedman    Procedure(s) Performed: Procedure(s) (LRB):  COLONOSCOPY (N/A)    Patient location: [facility]    Anesthesia Type: MAC    Transport from OR: Transported from OR on room air with adequate spontaneous ventilation    Post pain: adequate analgesia    Post assessment: no apparent anesthetic complications and tolerated procedure well    Post vital signs: stable    Level of consciousness: sedated and responds to stimulation    Complications: none    Transfer of care protocol was followed      Last vitals:   Visit Vitals  BP (!) 112/63 (Patient Position: Lying)   Pulse 69   Temp 36.7 °C (98.1 °F) (Temporal)   Resp 16   Ht 5' 9" (1.753 m)   Wt 83.9 kg (185 lb)   SpO2 95%   BMI 27.32 kg/m²     "

## 2022-06-15 NOTE — ANESTHESIA PREPROCEDURE EVALUATION
06/15/2022  Brian Friedman is a 72 y.o., male.  [facility]  Anesthesia Pre-Operative Evaluation       Patient Name: Brian Friedman  YOB: 1950  MRN: [id number]  CSN: [id number]      Code Status: Prior   Date of Procedure: 6/15/2022  Anesthesia: General/MAC Procedure: Procedure(s) (LRB):  COLONOSCOPY (N/A)  Pre-Operative Diagnosis: Encounter for colorectal cancer screening [Z12.11, Z12.12]  Proceduralist: Surgeon(s) and Role:     * Serg Turner MD - Primary        SUBJECTIVE:   Brian Friedman is a 72 y.o. male who  has a past medical history of Essential hypertension (1/14/2019). No notes on file    he has a current medication list which includes the following long-term medication(s): lisinopril-hydrochlorothiazide.   ALLERGIES:   Review of patient's allergies indicates:  No Known Allergies    History:   There are no hospital problems to display for this patient.    Past Medical History:   Diagnosis Date    Essential hypertension 1/14/2019       Surgical History:    has a past surgical history that includes Skin graft.   Social History:     reports that he has been smoking. He has been smoking about 0.50 packs per day. He has never used smokeless tobacco. He reports current alcohol use of about 10.0 standard drinks of alcohol per week. He reports that he does not use drugs.     OBJECTIVE:     Vital Signs (Most Recent):    Vital Signs Range (Last 24H):          There is no height or weight on file to calculate BMI.   Wt Readings from Last 4 Encounters:   01/29/19 83.9 kg (184 lb 15.5 oz)   01/14/19 84.4 kg (186 lb 1.1 oz)   01/12/19 86.2 kg (190 lb)   02/14/15 86.2 kg (190 lb)     Significant Labs:  Lab Results   Component Value Date    WBC 14.11 (H) 01/20/2019    HGB 11.7 (L) 01/20/2019    HCT 34.7 (L) 01/20/2019     01/20/2019     01/19/2019    K 3.5 01/19/2019      01/19/2019    CREATININE 0.8 01/19/2019    BUN 12 01/19/2019    CO2 23 01/19/2019    GLU 92 01/19/2019    CALCIUM 8.5 (L) 01/19/2019    MG 2.0 01/18/2019    PHOS 4.3 01/17/2019    ALKPHOS 97 01/18/2019    ALT 16 01/18/2019    AST 18 01/18/2019    ALBUMIN 2.5 (L) 01/18/2019    INR 1.2 01/13/2019    HGBA1C 5.0 01/14/2019    TROPONINI 0.074 (H) 01/14/2019     (H) 01/16/2019     No LMP for male patient.  No results found for this or any previous visit (from the past 72 hour(s)).    EKG:   Results for orders placed or performed during the hospital encounter of 01/13/19   EKG 12-lead    Collection Time: 01/15/19  7:12 AM    Narrative    Test Reason : I49.9,  Vent. Rate : 061 BPM     Atrial Rate : 061 BPM     P-R Int : 166 ms          QRS Dur : 078 ms      QT Int : 494 ms       P-R-T Axes : 042 036 032 degrees     QTc Int : 497 ms    Normal sinus rhythm  Prolonged QT  Abnormal ECG  When compared with ECG of 15-SHAHZAD-2019 00:48,  No significant change was found  Confirmed by PJ MALDONADO, HOMEYAR ([id number]) on 1/18/2019 12:47:04 PM    Referred By: [email or web address]   SELF           Confirmed By:HOMEYAR PJ MALDONADO       TTE:  Results for orders placed or performed during the hospital encounter of 01/13/19   Transthoracic echo (TTE) complete (Cupid Only)   Result Value Ref Range    Narrative    · Normal left ventricular systolic function. The estimated ejection   fraction is 55%  · Normal LV diastolic function.  · Mild tricuspid regurgitation.  · Normal right ventricular systolic function.  · Normal central venous pressure (3 mm Hg).  · The estimated PA systolic pressure is 37 mm Hg          ASSESSMENT/PLAN:         Pre-op Assessment    I have reviewed the Patient Summary Reports.     I have reviewed the Nursing Notes. I have reviewed the NPO Status.   I have reviewed the Medications.     Review of Systems  Social:  Smoker, Alcohol Use    Cardiovascular:   Hypertension    Pulmonary:   Hx of acute respiratory failure with hypoxia    Hepatic/GI:   Dysphagia       Physical Exam  General: Well nourished, Cooperative and Alert    Airway:  Mallampati: III / II  Mouth Opening: Normal  TM Distance: Normal  Tongue: Normal  Neck ROM: Normal ROM    Chest/Lungs:  Normal Respiratory Rate    Heart:  Rate: Normal  Rhythm: Regular Rhythm        Anesthesia Plan  Type of Anesthesia, risks & benefits discussed:    Anesthesia Type: Gen Natural Airway, MAC  Intra-op Monitoring Plan: Standard ASA Monitors  Post Op Pain Control Plan: IV/PO Opioids PRN  Induction:  IV  Informed Consent: Informed consent signed with the Patient and all parties understand the risks and agree with anesthesia plan.  All questions answered.   ASA Score: 2  Day of Surgery Review of History & Physical: H&P Update referred to the surgeon/provider.    Ready For Surgery From Anesthesia Perspective.     .

## 2022-06-22 LAB
FINAL PATHOLOGIC DIAGNOSIS: NORMAL
Lab: NORMAL

## 2022-11-25 DIAGNOSIS — Z87.891 PERSONAL HISTORY OF NICOTINE DEPENDENCE: ICD-10-CM

## 2022-11-25 DIAGNOSIS — Z72.0 TOBACCO USE: Primary | ICD-10-CM

## 2022-11-25 DIAGNOSIS — Z72.0 TOBACCO USE: ICD-10-CM

## 2022-11-25 DIAGNOSIS — Z13.6 ENCOUNTER FOR SCREENING FOR CARDIOVASCULAR DISORDERS: Primary | ICD-10-CM

## 2022-12-28 ENCOUNTER — HOSPITAL ENCOUNTER (OUTPATIENT)
Dept: RADIOLOGY | Facility: HOSPITAL | Age: 72
Discharge: HOME | End: 2022-12-28
Payer: MEDICARE

## 2022-12-28 DIAGNOSIS — Z72.0 TOBACCO USE: ICD-10-CM

## 2022-12-28 DIAGNOSIS — Z13.6 ENCOUNTER FOR SCREENING FOR CARDIOVASCULAR DISORDERS: ICD-10-CM

## 2022-12-28 DIAGNOSIS — Z87.891 PERSONAL HISTORY OF NICOTINE DEPENDENCE: ICD-10-CM

## 2022-12-28 PROCEDURE — 76775 US ABDOMINAL AORTA: ICD-10-PCS | Mod: 26,,, | Performed by: RADIOLOGY

## 2022-12-28 PROCEDURE — 76775 US EXAM ABDO BACK WALL LIM: CPT | Mod: 26,,, | Performed by: RADIOLOGY

## 2022-12-28 PROCEDURE — 71271 CT THORAX LUNG CANCER SCR C-: CPT | Mod: 26,,, | Performed by: RADIOLOGY

## 2022-12-28 PROCEDURE — 76775 US EXAM ABDO BACK WALL LIM: CPT | Mod: TC

## 2022-12-28 PROCEDURE — 71271 CT THORAX LUNG CANCER SCR C-: CPT | Mod: TC

## 2022-12-28 PROCEDURE — 71271 CT CHEST LUNG SCREENING LOW DOSE: ICD-10-PCS | Mod: 26,,, | Performed by: RADIOLOGY

## 2023-02-22 ENCOUNTER — TELEPHONE (OUTPATIENT)
Dept: DERMATOLOGY | Facility: CLINIC | Age: 73
End: 2023-02-22
Payer: MEDICARE

## 2023-02-22 NOTE — TELEPHONE ENCOUNTER
----- Message from Virginia Nichloas LPN sent at 2/16/2023  2:25 PM CST -----  Contact: Patient    ----- Message -----  From: Laurie Sherman  Sent: 2/16/2023  12:38 PM CST  To: Jessie Connell Staff    Type:  Patient Call          Who Called: patient         Does the patient know what this is regarding?: Requesting a call back to reschedule appt ; please advise           Would the patient rather a call back or a response via [facility]? Call           Best Call Back Number: [phone]             Additional Information:

## 2023-02-22 NOTE — TELEPHONE ENCOUNTER
Returned patients call, unable to leave message due to it says he is not accepting calls at this time.  Will await him to call back.

## 2023-04-04 NOTE — ASSESSMENT & PLAN NOTE
Patient diagnosed with hypertension within the past year. Patient states he is on an antihypertensive at home but is unclear of the name. No documentation in chart as to what patient is taking. Patient states he has not taken the medication for several days and often forgets dose.    - given hypotension requiring IVF resuscitation, will continue holding any antihypertensives     Olanzapine Counseling- I discussed with the patient the common side effects of olanzapine including but are not limited to: lack of energy, dry mouth, increased appetite, sleepiness, tremor, constipation, dizziness, changes in behavior, or restlessness.  Explained that teenagers are more likely to experience headaches, abdominal pain, pain in the arms or legs, tiredness, and sleepiness.  Serious side effects include but are not limited: increased risk of death in elderly patients who are confused, have memory loss, or dementia-related psychosis; hyperglycemia; increased cholesterol and triglycerides; and weight gain.

## 2023-05-02 ENCOUNTER — OFFICE VISIT (OUTPATIENT)
Dept: DERMATOLOGY | Facility: CLINIC | Age: 73
End: 2023-05-02
Payer: MEDICARE

## 2023-05-02 DIAGNOSIS — D22.30 CHANGE IN FACIAL MOLE: Primary | ICD-10-CM

## 2023-05-02 PROCEDURE — 1160F PR REVIEW ALL MEDS BY PRESCRIBER/CLIN PHARMACIST DOCUMENTED: ICD-10-PCS | Mod: CPTII,S$GLB,, | Performed by: DERMATOLOGY

## 2023-05-02 PROCEDURE — 99202 PR OFFICE/OUTPT VISIT, NEW, LEVL II, 15-29 MIN: ICD-10-PCS | Mod: S$GLB,,, | Performed by: DERMATOLOGY

## 2023-05-02 PROCEDURE — 1160F RVW MEDS BY RX/DR IN RCRD: CPT | Mod: CPTII,S$GLB,, | Performed by: DERMATOLOGY

## 2023-05-02 PROCEDURE — 1159F PR MEDICATION LIST DOCUMENTED IN MEDICAL RECORD: ICD-10-PCS | Mod: CPTII,S$GLB,, | Performed by: DERMATOLOGY

## 2023-05-02 PROCEDURE — 1126F PR PAIN SEVERITY QUANTIFIED, NO PAIN PRESENT: ICD-10-PCS | Mod: CPTII,S$GLB,, | Performed by: DERMATOLOGY

## 2023-05-02 PROCEDURE — 99999 PR PBB SHADOW E&M-EST. PATIENT-LVL III: ICD-10-PCS | Mod: PBBFAC,,, | Performed by: DERMATOLOGY

## 2023-05-02 PROCEDURE — 99999 PR PBB SHADOW E&M-EST. PATIENT-LVL III: CPT | Mod: PBBFAC,,, | Performed by: DERMATOLOGY

## 2023-05-02 PROCEDURE — 1126F AMNT PAIN NOTED NONE PRSNT: CPT | Mod: CPTII,S$GLB,, | Performed by: DERMATOLOGY

## 2023-05-02 PROCEDURE — 99202 OFFICE O/P NEW SF 15 MIN: CPT | Mod: S$GLB,,, | Performed by: DERMATOLOGY

## 2023-05-02 PROCEDURE — 1101F PR PT FALLS ASSESS DOC 0-1 FALLS W/OUT INJ PAST YR: ICD-10-PCS | Mod: CPTII,S$GLB,, | Performed by: DERMATOLOGY

## 2023-05-02 PROCEDURE — 1101F PT FALLS ASSESS-DOCD LE1/YR: CPT | Mod: CPTII,S$GLB,, | Performed by: DERMATOLOGY

## 2023-05-02 PROCEDURE — 3288F FALL RISK ASSESSMENT DOCD: CPT | Mod: CPTII,S$GLB,, | Performed by: DERMATOLOGY

## 2023-05-02 PROCEDURE — 1159F MED LIST DOCD IN RCRD: CPT | Mod: CPTII,S$GLB,, | Performed by: DERMATOLOGY

## 2023-05-02 PROCEDURE — 3288F PR FALLS RISK ASSESSMENT DOCUMENTED: ICD-10-PCS | Mod: CPTII,S$GLB,, | Performed by: DERMATOLOGY

## 2023-05-02 NOTE — PROGRESS NOTES
Subjective:      Patient ID:  Brian Friedman is a 72 y.o. male who presents for   Chief Complaint   Patient presents with    Lesion     forehead     Pt presents for lesion, forehead, many years, no s/s or tx  Interested in having this lesion removed.  Growing over time. It is round and shiny.    Review of Systems    Objective:   Physical Exam   Constitutional: He appears well-developed and well-nourished. No distress.   Neurological: He is alert and oriented to person, place, and time. He is not disoriented.   Psychiatric: He has a normal mood and affect.   Skin:   Areas Examined (abnormalities noted in diagram):   Head / Face Inspection Performed          Diagram Legend     Erythematous scaling macule/papule c/w actinic keratosis       Vascular papule c/w angioma      Pigmented verrucoid papule/plaque c/w seborrheic keratosis      Yellow umbilicated papule c/w sebaceous hyperplasia      Irregularly shaped tan macule c/w lentigo     1-2 mm smooth white papules consistent with Milia      Movable subcutaneous cyst with punctum c/w epidermal inclusion cyst      Subcutaneous movable cyst c/w pilar cyst      Firm pink to brown papule c/w dermatofibroma      Pedunculated fleshy papule(s) c/w skin tag(s)      Evenly pigmented macule c/w junctional nevus     Mildly variegated pigmented, slightly irregular-bordered macule c/w mildly atypical nevus      Flesh colored to evenly pigmented papule c/w intradermal nevus       Pink pearly papule/plaque c/w basal cell carcinoma      Erythematous hyperkeratotic cursted plaque c/w SCC      Surgical scar with no sign of skin cancer recurrence      Open and closed comedones      Inflammatory papules and pustules      Verrucoid papule consistent consistent with wart     Erythematous eczematous patches and plaques     Dystrophic onycholytic nail with subungual debris c/w onychomycosis     Umbilicated papule    Erythematous-base heme-crusted tan verrucoid plaque consistent with inflamed  seborrheic keratosis     Erythematous Silvery Scaling Plaque c/w Psoriasis     See annotation        Assessment / Plan:        Change in facial mole  -     Ambulatory referral/consult to ENT; Future; Expected date: 05/09/2023    Advise excision by facial plastics - referred to Dr Jass Perry for this    Favor benign lesion             No follow-ups on file.

## 2023-05-03 ENCOUNTER — TELEPHONE (OUTPATIENT)
Dept: OTOLARYNGOLOGY | Facility: CLINIC | Age: 73
End: 2023-05-03
Payer: MEDICARE

## 2023-05-05 ENCOUNTER — OFFICE VISIT (OUTPATIENT)
Dept: OTOLARYNGOLOGY | Facility: CLINIC | Age: 73
End: 2023-05-05
Payer: MEDICARE

## 2023-05-05 VITALS
WEIGHT: 186.31 LBS | BODY MASS INDEX: 27.51 KG/M2 | DIASTOLIC BLOOD PRESSURE: 72 MMHG | HEART RATE: 84 BPM | SYSTOLIC BLOOD PRESSURE: 122 MMHG

## 2023-05-05 DIAGNOSIS — D22.9 NEVUS: Primary | ICD-10-CM

## 2023-05-05 DIAGNOSIS — D22.30 CHANGE IN FACIAL MOLE: ICD-10-CM

## 2023-05-05 PROCEDURE — 3288F FALL RISK ASSESSMENT DOCD: CPT | Mod: CPTII,S$GLB,, | Performed by: OTOLARYNGOLOGY

## 2023-05-05 PROCEDURE — 1160F RVW MEDS BY RX/DR IN RCRD: CPT | Mod: CPTII,S$GLB,, | Performed by: OTOLARYNGOLOGY

## 2023-05-05 PROCEDURE — 1101F PR PT FALLS ASSESS DOC 0-1 FALLS W/OUT INJ PAST YR: ICD-10-PCS | Mod: CPTII,S$GLB,, | Performed by: OTOLARYNGOLOGY

## 2023-05-05 PROCEDURE — 3074F SYST BP LT 130 MM HG: CPT | Mod: CPTII,S$GLB,, | Performed by: OTOLARYNGOLOGY

## 2023-05-05 PROCEDURE — 1160F PR REVIEW ALL MEDS BY PRESCRIBER/CLIN PHARMACIST DOCUMENTED: ICD-10-PCS | Mod: CPTII,S$GLB,, | Performed by: OTOLARYNGOLOGY

## 2023-05-05 PROCEDURE — 3074F PR MOST RECENT SYSTOLIC BLOOD PRESSURE < 130 MM HG: ICD-10-PCS | Mod: CPTII,S$GLB,, | Performed by: OTOLARYNGOLOGY

## 2023-05-05 PROCEDURE — 1126F PR PAIN SEVERITY QUANTIFIED, NO PAIN PRESENT: ICD-10-PCS | Mod: CPTII,S$GLB,, | Performed by: OTOLARYNGOLOGY

## 2023-05-05 PROCEDURE — 99203 PR OFFICE/OUTPT VISIT, NEW, LEVL III, 30-44 MIN: ICD-10-PCS | Mod: S$GLB,,, | Performed by: OTOLARYNGOLOGY

## 2023-05-05 PROCEDURE — 3008F BODY MASS INDEX DOCD: CPT | Mod: CPTII,S$GLB,, | Performed by: OTOLARYNGOLOGY

## 2023-05-05 PROCEDURE — 3078F PR MOST RECENT DIASTOLIC BLOOD PRESSURE < 80 MM HG: ICD-10-PCS | Mod: CPTII,S$GLB,, | Performed by: OTOLARYNGOLOGY

## 2023-05-05 PROCEDURE — 1101F PT FALLS ASSESS-DOCD LE1/YR: CPT | Mod: CPTII,S$GLB,, | Performed by: OTOLARYNGOLOGY

## 2023-05-05 PROCEDURE — 99999 PR PBB SHADOW E&M-EST. PATIENT-LVL III: CPT | Mod: PBBFAC,,, | Performed by: OTOLARYNGOLOGY

## 2023-05-05 PROCEDURE — 3078F DIAST BP <80 MM HG: CPT | Mod: CPTII,S$GLB,, | Performed by: OTOLARYNGOLOGY

## 2023-05-05 PROCEDURE — 3288F PR FALLS RISK ASSESSMENT DOCUMENTED: ICD-10-PCS | Mod: CPTII,S$GLB,, | Performed by: OTOLARYNGOLOGY

## 2023-05-05 PROCEDURE — 3008F PR BODY MASS INDEX (BMI) DOCUMENTED: ICD-10-PCS | Mod: CPTII,S$GLB,, | Performed by: OTOLARYNGOLOGY

## 2023-05-05 PROCEDURE — 1159F PR MEDICATION LIST DOCUMENTED IN MEDICAL RECORD: ICD-10-PCS | Mod: CPTII,S$GLB,, | Performed by: OTOLARYNGOLOGY

## 2023-05-05 PROCEDURE — 1159F MED LIST DOCD IN RCRD: CPT | Mod: CPTII,S$GLB,, | Performed by: OTOLARYNGOLOGY

## 2023-05-05 PROCEDURE — 99203 OFFICE O/P NEW LOW 30 MIN: CPT | Mod: S$GLB,,, | Performed by: OTOLARYNGOLOGY

## 2023-05-05 PROCEDURE — 99999 PR PBB SHADOW E&M-EST. PATIENT-LVL III: ICD-10-PCS | Mod: PBBFAC,,, | Performed by: OTOLARYNGOLOGY

## 2023-05-05 PROCEDURE — 1126F AMNT PAIN NOTED NONE PRSNT: CPT | Mod: CPTII,S$GLB,, | Performed by: OTOLARYNGOLOGY

## 2023-05-05 NOTE — PROGRESS NOTES
is a 72-year-old black male who presents referred by Dr. Benitez for an enlarging lesion of the forehead.  He states that he has had this for years and it has enlarged steadily and sometimes swell significantly.  He denies any bleeding of this lesion.  On exam he has an approximally 1 cm pedunculated lesion of the central forehead over the right medial brow area.  I have discussed my findings with he and his wife in detail as well as my recommendations for treatment.  We have discussed excision of this in our minor surgery room under local anesthesia.  We will send the lesion for pathologic examination as well.  I have discussed the pros and cons risks and benefits as well as technical aspects of this procedure with him in detail.  We will set this up for him on a Wednesday at his convenience.

## 2023-07-12 ENCOUNTER — HOSPITAL ENCOUNTER (INPATIENT)
Facility: HOSPITAL | Age: 73
LOS: 2 days | Discharge: HOME | DRG: 872 | End: 2023-07-15
Attending: EMERGENCY MEDICINE | Admitting: INTERNAL MEDICINE
Payer: MEDICARE

## 2023-07-12 DIAGNOSIS — N45.2 ORCHITIS: Primary | ICD-10-CM

## 2023-07-12 DIAGNOSIS — I10 ESSENTIAL HYPERTENSION: ICD-10-CM

## 2023-07-12 DIAGNOSIS — N50.82 SCROTUM PAIN: ICD-10-CM

## 2023-07-12 DIAGNOSIS — R07.9 CHEST PAIN: ICD-10-CM

## 2023-07-12 DIAGNOSIS — F17.200 TOBACCO DEPENDENCE: ICD-10-CM

## 2023-07-12 DIAGNOSIS — J43.2 CENTRILOBULAR EMPHYSEMA: ICD-10-CM

## 2023-07-12 PROBLEM — E55.9 VITAMIN D DEFICIENCY: Status: ACTIVE | Noted: 2023-07-12

## 2023-07-12 LAB
ALBUMIN SERPL BCP-MCNC: 3.4 G/DL (ref 3.5–5.2)
ALLENS TEST: NORMAL
ALP SERPL-CCNC: 96 U/L (ref 55–135)
ALT SERPL W/O P-5'-P-CCNC: 9 U/L (ref 10–44)
ANION GAP SERPL CALC-SCNC: 15 MMOL/L (ref 8–16)
ANISOCYTOSIS BLD QL SMEAR: SLIGHT
AST SERPL-CCNC: 16 U/L (ref 10–40)
BASOPHILS # BLD AUTO: ABNORMAL K/UL (ref 0–0.2)
BASOPHILS NFR BLD: 0 % (ref 0–1.9)
BILIRUB SERPL-MCNC: 0.7 MG/DL (ref 0.1–1)
BILIRUB UR QL STRIP: NEGATIVE
BUN SERPL-MCNC: 7 MG/DL (ref 8–23)
CALCIUM SERPL-MCNC: 8.8 MG/DL (ref 8.7–10.5)
CHLORIDE SERPL-SCNC: 107 MMOL/L (ref 95–110)
CLARITY UR REFRACT.AUTO: CLEAR
CO2 SERPL-SCNC: 16 MMOL/L (ref 23–29)
COLOR UR AUTO: YELLOW
CREAT SERPL-MCNC: 1 MG/DL (ref 0.5–1.4)
DIFFERENTIAL METHOD: ABNORMAL
EOSINOPHIL # BLD AUTO: ABNORMAL K/UL (ref 0–0.5)
EOSINOPHIL NFR BLD: 0 % (ref 0–8)
ERYTHROCYTE [DISTWIDTH] IN BLOOD BY AUTOMATED COUNT: 12.8 % (ref 11.5–14.5)
EST. GFR  (NO RACE VARIABLE): >60 ML/MIN/1.73 M^2
GLUCOSE SERPL-MCNC: 198 MG/DL (ref 70–110)
GLUCOSE UR QL STRIP: NEGATIVE
HCT VFR BLD AUTO: 41.6 % (ref 40–54)
HCV AB SERPL QL IA: REACTIVE
HGB BLD-MCNC: 14.5 G/DL (ref 14–18)
HGB UR QL STRIP: NEGATIVE
HIV 1+2 AB+HIV1 P24 AG SERPL QL IA: NORMAL
HYPOCHROMIA BLD QL SMEAR: ABNORMAL
IMM GRANULOCYTES # BLD AUTO: ABNORMAL K/UL (ref 0–0.04)
IMM GRANULOCYTES NFR BLD AUTO: ABNORMAL % (ref 0–0.5)
KETONES UR QL STRIP: NEGATIVE
LACTATE SERPL-SCNC: 2.3 MMOL/L (ref 0.5–2.2)
LACTATE SERPL-SCNC: 5.4 MMOL/L (ref 0.5–2.2)
LDH SERPL L TO P-CCNC: 1.51 MMOL/L (ref 0.5–2.2)
LEUKOCYTE ESTERASE UR QL STRIP: ABNORMAL
LIPASE SERPL-CCNC: 14 U/L (ref 4–60)
LYMPHOCYTES # BLD AUTO: ABNORMAL K/UL (ref 1–4.8)
LYMPHOCYTES NFR BLD: 4 % (ref 18–48)
MCH RBC QN AUTO: 34.8 PG (ref 27–31)
MCHC RBC AUTO-ENTMCNC: 34.9 G/DL (ref 32–36)
MCV RBC AUTO: 100 FL (ref 82–98)
MICROSCOPIC COMMENT: ABNORMAL
MONOCYTES # BLD AUTO: ABNORMAL K/UL (ref 0.3–1)
MONOCYTES NFR BLD: 1 % (ref 4–15)
NEUTROPHILS NFR BLD: 89 % (ref 38–73)
NEUTS BAND NFR BLD MANUAL: 6 %
NITRITE UR QL STRIP: NEGATIVE
NRBC BLD-RTO: 0 /100 WBC
OVALOCYTES BLD QL SMEAR: ABNORMAL
PH UR STRIP: 5 [PH] (ref 5–8)
PLATELET # BLD AUTO: 214 K/UL (ref 150–450)
PMV BLD AUTO: 9 FL (ref 9.2–12.9)
POIKILOCYTOSIS BLD QL SMEAR: SLIGHT
POLYCHROMASIA BLD QL SMEAR: ABNORMAL
POTASSIUM SERPL-SCNC: 3 MMOL/L (ref 3.5–5.1)
PROT SERPL-MCNC: 7.1 G/DL (ref 6–8.4)
PROT UR QL STRIP: NEGATIVE
RBC # BLD AUTO: 4.17 M/UL (ref 4.6–6.2)
RBC #/AREA URNS AUTO: 1 /HPF (ref 0–4)
SAMPLE: NORMAL
SITE: NORMAL
SODIUM SERPL-SCNC: 138 MMOL/L (ref 136–145)
SP GR UR STRIP: 1.01 (ref 1–1.03)
SPHEROCYTES BLD QL SMEAR: ABNORMAL
SQUAMOUS #/AREA URNS AUTO: 3 /HPF
TOXIC GRANULES BLD QL SMEAR: PRESENT
URN SPEC COLLECT METH UR: ABNORMAL
WBC # BLD AUTO: 24.84 K/UL (ref 3.9–12.7)
WBC #/AREA URNS AUTO: 26 /HPF (ref 0–5)

## 2023-07-12 PROCEDURE — 25000003 PHARM REV CODE 250: Performed by: INTERNAL MEDICINE

## 2023-07-12 PROCEDURE — 63600175 PHARM REV CODE 636 W HCPCS: Performed by: EMERGENCY MEDICINE

## 2023-07-12 PROCEDURE — 25000003 PHARM REV CODE 250

## 2023-07-12 PROCEDURE — G0378 HOSPITAL OBSERVATION PER HR: HCPCS

## 2023-07-12 PROCEDURE — 99285 EMERGENCY DEPT VISIT HI MDM: CPT | Mod: 25

## 2023-07-12 PROCEDURE — 87086 URINE CULTURE/COLONY COUNT: CPT | Performed by: EMERGENCY MEDICINE

## 2023-07-12 PROCEDURE — 80053 COMPREHEN METABOLIC PANEL: CPT | Performed by: EMERGENCY MEDICINE

## 2023-07-12 PROCEDURE — 83605 ASSAY OF LACTIC ACID: CPT | Performed by: EMERGENCY MEDICINE

## 2023-07-12 PROCEDURE — 99223 PR INITIAL HOSPITAL CARE,LEVL III: ICD-10-PCS | Mod: ,,, | Performed by: PHYSICIAN ASSISTANT

## 2023-07-12 PROCEDURE — 81001 URINALYSIS AUTO W/SCOPE: CPT | Performed by: EMERGENCY MEDICINE

## 2023-07-12 PROCEDURE — 99900035 HC TECH TIME PER 15 MIN (STAT)

## 2023-07-12 PROCEDURE — 83605 ASSAY OF LACTIC ACID: CPT | Mod: 91 | Performed by: INTERNAL MEDICINE

## 2023-07-12 PROCEDURE — 96361 HYDRATE IV INFUSION ADD-ON: CPT

## 2023-07-12 PROCEDURE — 87389 HIV-1 AG W/HIV-1&-2 AB AG IA: CPT | Performed by: PHYSICIAN ASSISTANT

## 2023-07-12 PROCEDURE — 87522 HEPATITIS C REVRS TRNSCRPJ: CPT | Performed by: PHYSICIAN ASSISTANT

## 2023-07-12 PROCEDURE — 83605 ASSAY OF LACTIC ACID: CPT

## 2023-07-12 PROCEDURE — 87040 BLOOD CULTURE FOR BACTERIA: CPT | Mod: 59 | Performed by: EMERGENCY MEDICINE

## 2023-07-12 PROCEDURE — 96365 THER/PROPH/DIAG IV INF INIT: CPT

## 2023-07-12 PROCEDURE — 83690 ASSAY OF LIPASE: CPT | Performed by: EMERGENCY MEDICINE

## 2023-07-12 PROCEDURE — 99223 1ST HOSP IP/OBS HIGH 75: CPT | Mod: ,,, | Performed by: PHYSICIAN ASSISTANT

## 2023-07-12 PROCEDURE — S4991 NICOTINE PATCH NONLEGEND: HCPCS

## 2023-07-12 PROCEDURE — 86803 HEPATITIS C AB TEST: CPT | Performed by: PHYSICIAN ASSISTANT

## 2023-07-12 PROCEDURE — 87522 HEPATITIS C REVRS TRNSCRPJ: CPT | Performed by: EMERGENCY MEDICINE

## 2023-07-12 PROCEDURE — 25000003 PHARM REV CODE 250: Performed by: EMERGENCY MEDICINE

## 2023-07-12 PROCEDURE — 96375 TX/PRO/DX INJ NEW DRUG ADDON: CPT

## 2023-07-12 PROCEDURE — 85025 COMPLETE CBC W/AUTO DIFF WBC: CPT | Performed by: EMERGENCY MEDICINE

## 2023-07-12 RX ORDER — AMLODIPINE BESYLATE 10 MG/1
10 TABLET ORAL DAILY
COMMUNITY
Start: 2023-01-29

## 2023-07-12 RX ORDER — MAG HYDROX/ALUMINUM HYD/SIMETH 200-200-20
30 SUSPENSION, ORAL (FINAL DOSE FORM) ORAL 4 TIMES DAILY PRN
Status: DISCONTINUED | OUTPATIENT
Start: 2023-07-12 | End: 2023-07-15 | Stop reason: HOSPADM

## 2023-07-12 RX ORDER — ACETAMINOPHEN 500 MG
1000 TABLET ORAL
Status: COMPLETED | OUTPATIENT
Start: 2023-07-12 | End: 2023-07-12

## 2023-07-12 RX ORDER — NALOXONE HCL 0.4 MG/ML
0.02 VIAL (ML) INJECTION
Status: DISCONTINUED | OUTPATIENT
Start: 2023-07-12 | End: 2023-07-15 | Stop reason: HOSPADM

## 2023-07-12 RX ORDER — FOLIC ACID 1 MG/1
1000 TABLET ORAL DAILY
COMMUNITY
Start: 2023-03-16

## 2023-07-12 RX ORDER — THIAMINE HCL 100 MG
100 TABLET ORAL DAILY
Status: DISCONTINUED | OUTPATIENT
Start: 2023-07-13 | End: 2023-07-15 | Stop reason: HOSPADM

## 2023-07-12 RX ORDER — ACETAMINOPHEN 325 MG/1
650 TABLET ORAL EVERY 4 HOURS PRN
Status: DISCONTINUED | OUTPATIENT
Start: 2023-07-12 | End: 2023-07-15 | Stop reason: HOSPADM

## 2023-07-12 RX ORDER — HYDROCODONE BITARTRATE AND ACETAMINOPHEN 5; 325 MG/1; MG/1
1 TABLET ORAL EVERY 6 HOURS PRN
Status: DISCONTINUED | OUTPATIENT
Start: 2023-07-12 | End: 2023-07-15 | Stop reason: HOSPADM

## 2023-07-12 RX ORDER — IBUPROFEN 200 MG
24 TABLET ORAL
Status: DISCONTINUED | OUTPATIENT
Start: 2023-07-12 | End: 2023-07-15 | Stop reason: HOSPADM

## 2023-07-12 RX ORDER — AMLODIPINE BESYLATE 10 MG/1
10 TABLET ORAL DAILY
Status: DISCONTINUED | OUTPATIENT
Start: 2023-07-13 | End: 2023-07-15 | Stop reason: HOSPADM

## 2023-07-12 RX ORDER — ACETAMINOPHEN 500 MG
1000 TABLET ORAL EVERY 8 HOURS PRN
Status: DISCONTINUED | OUTPATIENT
Start: 2023-07-12 | End: 2023-07-15 | Stop reason: HOSPADM

## 2023-07-12 RX ORDER — ONDANSETRON 4 MG/1
4 TABLET, ORALLY DISINTEGRATING ORAL EVERY 6 HOURS PRN
Status: DISCONTINUED | OUTPATIENT
Start: 2023-07-12 | End: 2023-07-15 | Stop reason: HOSPADM

## 2023-07-12 RX ORDER — GLUCAGON 1 MG
1 KIT INJECTION
Status: DISCONTINUED | OUTPATIENT
Start: 2023-07-12 | End: 2023-07-15 | Stop reason: HOSPADM

## 2023-07-12 RX ORDER — SODIUM CHLORIDE 9 MG/ML
INJECTION, SOLUTION INTRAVENOUS CONTINUOUS
Status: DISCONTINUED | OUTPATIENT
Start: 2023-07-12 | End: 2023-07-13

## 2023-07-12 RX ORDER — LANOLIN ALCOHOL/MO/W.PET/CERES
100 CREAM (GRAM) TOPICAL DAILY
COMMUNITY
Start: 2023-03-16

## 2023-07-12 RX ORDER — ALBUTEROL SULFATE 90 UG/1
2 AEROSOL, METERED RESPIRATORY (INHALATION) EVERY 4 HOURS PRN
COMMUNITY
Start: 2023-06-02

## 2023-07-12 RX ORDER — FLUTICASONE PROPIONATE 50 MCG
1 SPRAY, SUSPENSION (ML) NASAL DAILY PRN
COMMUNITY
Start: 2023-02-16

## 2023-07-12 RX ORDER — SIMETHICONE 80 MG
1 TABLET,CHEWABLE ORAL 4 TIMES DAILY PRN
Status: DISCONTINUED | OUTPATIENT
Start: 2023-07-12 | End: 2023-07-15 | Stop reason: HOSPADM

## 2023-07-12 RX ORDER — PROCHLORPERAZINE EDISYLATE 5 MG/ML
5 INJECTION INTRAMUSCULAR; INTRAVENOUS EVERY 6 HOURS PRN
Status: DISCONTINUED | OUTPATIENT
Start: 2023-07-12 | End: 2023-07-15 | Stop reason: HOSPADM

## 2023-07-12 RX ORDER — CHOLECALCIFEROL (VITAMIN D3) 25 MCG
1000 TABLET ORAL
COMMUNITY
Start: 2023-02-16

## 2023-07-12 RX ORDER — TALC
6 POWDER (GRAM) TOPICAL NIGHTLY PRN
Status: DISCONTINUED | OUTPATIENT
Start: 2023-07-12 | End: 2023-07-15 | Stop reason: HOSPADM

## 2023-07-12 RX ORDER — IBUPROFEN 200 MG
16 TABLET ORAL
Status: DISCONTINUED | OUTPATIENT
Start: 2023-07-12 | End: 2023-07-15 | Stop reason: HOSPADM

## 2023-07-12 RX ORDER — FOLIC ACID 1 MG/1
1000 TABLET ORAL DAILY
Status: DISCONTINUED | OUTPATIENT
Start: 2023-07-13 | End: 2023-07-15 | Stop reason: HOSPADM

## 2023-07-12 RX ORDER — SODIUM CHLORIDE 0.9 % (FLUSH) 0.9 %
5 SYRINGE (ML) INJECTION
Status: DISCONTINUED | OUTPATIENT
Start: 2023-07-12 | End: 2023-07-15 | Stop reason: HOSPADM

## 2023-07-12 RX ORDER — MIRTAZAPINE 7.5 MG/1
7.5 TABLET, FILM COATED ORAL DAILY
Status: DISCONTINUED | OUTPATIENT
Start: 2023-07-13 | End: 2023-07-15 | Stop reason: HOSPADM

## 2023-07-12 RX ORDER — IPRATROPIUM BROMIDE AND ALBUTEROL SULFATE 2.5; .5 MG/3ML; MG/3ML
3 SOLUTION RESPIRATORY (INHALATION) EVERY 4 HOURS PRN
Status: DISCONTINUED | OUTPATIENT
Start: 2023-07-12 | End: 2023-07-14

## 2023-07-12 RX ORDER — CHOLECALCIFEROL (VITAMIN D3) 25 MCG
1000 TABLET ORAL DAILY
Status: DISCONTINUED | OUTPATIENT
Start: 2023-07-13 | End: 2023-07-15 | Stop reason: HOSPADM

## 2023-07-12 RX ORDER — MIRTAZAPINE 7.5 MG/1
7.5 TABLET, FILM COATED ORAL DAILY
COMMUNITY
Start: 2023-06-01

## 2023-07-12 RX ORDER — MORPHINE SULFATE 2 MG/ML
6 INJECTION, SOLUTION INTRAMUSCULAR; INTRAVENOUS
Status: COMPLETED | OUTPATIENT
Start: 2023-07-12 | End: 2023-07-12

## 2023-07-12 RX ORDER — IBUPROFEN 200 MG
1 TABLET ORAL DAILY
Status: DISCONTINUED | OUTPATIENT
Start: 2023-07-12 | End: 2023-07-15 | Stop reason: HOSPADM

## 2023-07-12 RX ADMIN — SODIUM CHLORIDE: 9 INJECTION, SOLUTION INTRAVENOUS at 08:07

## 2023-07-12 RX ADMIN — SODIUM CHLORIDE, POTASSIUM CHLORIDE, SODIUM LACTATE AND CALCIUM CHLORIDE 1000 ML: 600; 310; 30; 20 INJECTION, SOLUTION INTRAVENOUS at 01:07

## 2023-07-12 RX ADMIN — PIPERACILLIN SODIUM AND TAZOBACTAM SODIUM 4.5 G: 4; .5 INJECTION, POWDER, LYOPHILIZED, FOR SOLUTION INTRAVENOUS at 01:07

## 2023-07-12 RX ADMIN — MORPHINE SULFATE 6 MG: 2 INJECTION, SOLUTION INTRAMUSCULAR; INTRAVENOUS at 10:07

## 2023-07-12 RX ADMIN — NICOTINE 1 PATCH: 14 PATCH, EXTENDED RELEASE TRANSDERMAL at 04:07

## 2023-07-12 RX ADMIN — POTASSIUM BICARBONATE 25 MEQ: 978 TABLET, EFFERVESCENT ORAL at 01:07

## 2023-07-12 RX ADMIN — ACETAMINOPHEN 1000 MG: 500 TABLET ORAL at 10:07

## 2023-07-12 NOTE — PHARMACY MED REC
"Admission Medication History     The home medication history was taken by Sharon Lion.    You may go to "Admission" then "Reconcile Home Medications" tabs to review and/or act upon these items.     The home medication list has been updated by the Pharmacy department.   Please read ALL comments highlighted in yellow.   Please address this information as you see fit.    Feel free to contact us if you have any questions or require assistance.      The medications listed below were removed from the home medication list. Please reorder if appropriate:  Patient reports no longer taking the following medication(s):  LISINOPRIL-HYDROCHLOROTHIAZIDE 10-12.5 MG    Medications listed below were obtained from: Patient/family and Analytic software- [employer]_  Current Outpatient Medications on File Prior to Encounter   Medication Sig    albuterol (PROVENTIL/VENTOLIN HFA) 90 mcg/actuation inhaler Inhale 2 puffs into the lungs every 4 (four) hours as needed for Wheezing or Shortness of Breath.    amLODIPine (NORVASC) 10 MG tablet Take 10 mg by mouth once daily. Last filled 1-29-23 for 90 day supply    aspirin/acetaminophen (GOODY'S BODY PAIN ORAL) Take 1 tablet by mouth daily as needed (headache).    fluticasone propionate (FLONASE) 50 mcg/actuation nasal spray 1 spray by Each Nostril route daily as needed for Allergies.    folic acid (FOLVITE) 1 MG tablet Take 1,000 mcg by mouth once daily. Last filled  3-16-23 for 90 day supply    mirtazapine (REMERON) 7.5 MG Tab Take 7.5 mg by mouth once daily.    thiamine 100 MG tablet Take 100 mg by mouth once daily.Last filled 3-16-23 for 90 day supply           Potential issues to be addressed PRIOR TO DISCHARGE  Please discuss with the patient barriers to adherence with medication treatment plans    Sharon Lion  EXT [phone]                  .        "

## 2023-07-12 NOTE — CONSULTS
[facility] - [facility] Dept  Urology  Consult Note    Patient Name: Brian Friedman  MRN: [id number]  Admission Date: 7/12/2023  Hospital Length of Stay: 0   Code Status: Prior   Attending Provider: Ginette Swenson MD   Consulting Provider: Jimmie Bolaños MD  Primary Care Physician: Va Martínez MD  Principal Problem:<principal problem not specified>    Inpatient consult to Urology  Consult performed by: Jimmie Bolaños MD  Consult ordered by: Ginette Swenson MD  Reason for consult: L orchitis          Subjective:     HPI:  73-year-old male with a history of hypertension, complaining of left groin pain, onset yesterday, sudden, and pain severe and constant since that time.  He denies any urinary symptoms, no penile discharge.  He has no fevers, chills, nausea, no vomiting and no constipation.  Patient reports that he is not been sexually active in 3 years. Denies any abdominal distention. Denies trauma or hx of UTI or STD.       Scrotal US - left epididymitis orchitis, left hydrocele, flow both testicles  Cr 1.0, WBC 24.8, Lactic acid 5.4  UA - awaiting collection          Past Medical History:   Diagnosis Date    Essential hypertension 1/14/2019       Past Surgical History:   Procedure Laterality Date    COLONOSCOPY N/A 6/15/2022    Procedure: COLONOSCOPY;  Surgeon: Serg Turner MD;  Location: [address];  Service: Endoscopy;  Laterality: N/A;    SKIN GRAFT         Review of patient's allergies indicates:  No Known Allergies    Family History    None         Tobacco Use    Smoking status: Every Day     Packs/day: 0.50     Types: Cigarettes    Smokeless tobacco: Never   Substance and Sexual Activity    Alcohol use: Yes     Alcohol/week: 10.0 standard drinks     Types: 10 Cans of beer per week     Comment: daily  Gin and cranberry juice    Drug use: No    Sexual activity: Not Currently       Review of Systems    Objective:     Temp:  [97.6 °F (36.4 °C)] 97.6 °F (36.4 °C)  Pulse:   [66-89] 66  Resp:  [16-22] 18  SpO2:  [95 %-97 %] 97 %  BP: (126-138)/(60-63) 138/63  Weight: 86.2 kg (190 lb)  Body mass index is 28.06 kg/m².           Drains       None                    Physical Exam  Constitutional:       Appearance: Normal appearance.   HENT:      Head: Normocephalic.   Eyes:      Pupils: Pupils are equal, round, and reactive to light.   Cardiovascular:      Rate and Rhythm: Normal rate.   Pulmonary:      Effort: Pulmonary effort is normal.   Chest:      Chest wall: No tenderness.   Abdominal:      General: Abdomen is flat. There is no distension.      Palpations: Abdomen is soft.      Tenderness: There is no abdominal tenderness. There is no right CVA tenderness or left CVA tenderness.   Genitourinary:     Comments: Uncircumcised, orthotopic meatus  Left testicle swollen and tender to palpation, no overt masses  Right testicle normal to palpation, no tenderness  Musculoskeletal:         General: Normal range of motion.      Cervical back: Normal range of motion.   Skin:     General: Skin is warm.   Neurological:      General: No focal deficit present.      Mental Status: He is alert and oriented to person, place, and time.   Psychiatric:         Mood and Affect: Mood normal.         Behavior: Behavior normal.        Significant Labs:    BMP:  Recent Labs   Lab 07/12/23  1027      K 3.0*      CO2 16*   BUN 7*   CREATININE 1.0   CALCIUM 8.8       CBC:  Recent Labs   Lab 07/12/23  1027   WBC 24.84*   HGB 14.5   HCT 41.6          All pertinent labs results from the past 24 hours have been reviewed.    Significant Imaging:  All pertinent imaging results/findings from the past 24 hours have been reviewed.                      Assessment and Plan:     Orchitis  -scrotal US and physical exam concerning for L orchitis  -needs UA  -recommend Levaquin, scrotal support, NSAIDS, ice packs  -pain and discomfort can become chronic in nature  -WBC and lactic acid do not correlate with  patient only having orchitis          VTE Risk Mitigation (From admission, onward)    None          Thank you for your consult. I will sign off. Please contact us if you have any additional questions.    Jimmie Bolaños MD  Urology  [facility] - [facility] Dept

## 2023-07-12 NOTE — ASSESSMENT & PLAN NOTE
-scrotal US and physical exam concerning for L orchitis  -needs UA  -recommend Levaquin, scrotal support, NSAIDS, ice packs  -pain and discomfort can become chronic in nature  -WBC and lactic acid do not correlate with patient only having orchitis

## 2023-07-12 NOTE — SUBJECTIVE & OBJECTIVE
Past Medical History:   Diagnosis Date    Essential hypertension 1/14/2019       Past Surgical History:   Procedure Laterality Date    COLONOSCOPY N/A 6/15/2022    Procedure: COLONOSCOPY;  Surgeon: Serg Turner MD;  Location: [facility] ([address] [place] [address]);  Service: Endoscopy;  Laterality: N/A;    SKIN GRAFT         Review of patient's allergies indicates:  No Known Allergies    Family History    None         Tobacco Use    Smoking status: Every Day     Packs/day: 0.50     Types: Cigarettes    Smokeless tobacco: Never   Substance and Sexual Activity    Alcohol use: Yes     Alcohol/week: 10.0 standard drinks     Types: 10 Cans of beer per week     Comment: daily  Gin and cranberry juice    Drug use: No    Sexual activity: Not Currently       Review of Systems    Objective:     Temp:  [97.6 °F (36.4 °C)] 97.6 °F (36.4 °C)  Pulse:  [66-89] 66  Resp:  [16-22] 18  SpO2:  [95 %-97 %] 97 %  BP: (126-138)/(60-63) 138/63  Weight: 86.2 kg (190 lb)  Body mass index is 28.06 kg/m².           Drains       None                    Physical Exam  Constitutional:       Appearance: Normal appearance.   HENT:      Head: Normocephalic.   Eyes:      Pupils: Pupils are equal, round, and reactive to light.   Cardiovascular:      Rate and Rhythm: Normal rate.   Pulmonary:      Effort: Pulmonary effort is normal.   Chest:      Chest wall: No tenderness.   Abdominal:      General: Abdomen is flat. There is no distension.      Palpations: Abdomen is soft.      Tenderness: There is no abdominal tenderness. There is no right CVA tenderness or left CVA tenderness.   Genitourinary:     Comments: Uncircumcised, orthotopic meatus  Left testicle swollen and tender to palpation, no overt masses  Right testicle normal to palpation, no tenderness  Musculoskeletal:         General: Normal range of motion.      Cervical back: Normal range of motion.   Skin:     General: Skin is warm.   Neurological:      General: No focal deficit present.      Mental Status: He  is alert and oriented to person, place, and time.   Psychiatric:         Mood and Affect: Mood normal.         Behavior: Behavior normal.        Significant Labs:    BMP:  Recent Labs   Lab 07/12/23  1027      K 3.0*      CO2 16*   BUN 7*   CREATININE 1.0   CALCIUM 8.8       CBC:  Recent Labs   Lab 07/12/23  1027   WBC 24.84*   HGB 14.5   HCT 41.6          All pertinent labs results from the past 24 hours have been reviewed.    Significant Imaging:  All pertinent imaging results/findings from the past 24 hours have been reviewed.

## 2023-07-12 NOTE — HPI
73-year-old male with a history of hypertension, complaining of left groin pain, onset yesterday, sudden, and pain severe and constant since that time.  He denies any urinary symptoms, no penile discharge.  He has no fevers, chills, nausea, no vomiting and no constipation.  Patient reports that he is not been sexually active in 3 years. Denies any abdominal distention. Denies trauma or hx of UTI or STD.       Scrotal US - left epididymitis orchitis, left hydrocele, flow both testicles  Cr 1.0, WBC 24.8, Lactic acid 5.4  UA - awaiting collection

## 2023-07-12 NOTE — ASSESSMENT & PLAN NOTE
Sepsis  Acute cystitis  This patient does have evidence of infective focus  My overall impression is sepsis.  Source: Urinary Tract  Antibiotics given-   Antibiotics (72h ago, onward)    None        Latest lactate reviewed-  Recent Labs   Lab 07/12/23  1027 07/12/23  1420   LACTATE 5.4*  --    POCLAC  --  1.51     Source control achieved by: IV zosyn    - AFVSS  - Leukocytosis 24.84  - LA 5.4, trending  - HIV negative. STD panel ordered  - UA w/ 2+ leukocytes, WBC 26  - US scrotum and testicles: Left epididymo-orchitis, with a small reactive hydrocele.  - s/p tylenol 1 g, continue prn  - LR 1 L bolus  - IV zosyn  - Urology consulted in ED   -scrotal US and physical exam concerning for L orchitis   -recommend Levaquin, scrotal support, NSAIDS, ice packs  - transrectal US ordered  - PSA

## 2023-07-12 NOTE — ED NOTES
Pt identifiers Brian Friedman were checked and are correct  LOC: The patient is awake, alert, aware of environment with an appropriate affect. Oriented x4, speaking appropriately  APPEARANCE: Pt rates left groin pain a 10/10 , in no acute distress, pt is clean and well groomed, clothing properly fastened  SKIN: Skin warm, dry and intact, normal skin turgor, moist mucus membranes  RESPIRATORY: Airway is open and patent, respirations are spontaneous, even and unlabored, normal effort and rate  CARDIAC: Normal rate and rhythm, no peripheral edema noted, capillary refill < 3 seconds, bilateral radial pulses 2+  ABDOMEN: Soft, nontender, nondistended. Bowel sounds present to all four quad of abd on auscultation  NEUROLOGIC: PERRL, facial expression is symmetrical, patient moving all extremities spontaneously, normal sensation in all extremities when touched with a finger.  Follows all commands appropriately  MUSCULOSKELETAL: No obvious deformities.

## 2023-07-12 NOTE — SUBJECTIVE & OBJECTIVE
Past Medical History:   Diagnosis Date    Essential hypertension 1/14/2019       Past Surgical History:   Procedure Laterality Date    COLONOSCOPY N/A 6/15/2022    Procedure: COLONOSCOPY;  Surgeon: Serg Turner MD;  Location: [facility] ([address] [place] [address]);  Service: Endoscopy;  Laterality: N/A;    SKIN GRAFT         Review of patient's allergies indicates:  No Known Allergies    No current facility-administered medications on file prior to encounter.     Current Outpatient Medications on File Prior to Encounter   Medication Sig    albuterol (PROVENTIL/VENTOLIN HFA) 90 mcg/actuation inhaler Inhale 2 puffs into the lungs every 4 (four) hours as needed for Wheezing or Shortness of Breath.    amLODIPine (NORVASC) 10 MG tablet Take 10 mg by mouth once daily.    aspirin/acetaminophen (GOODY'S BODY PAIN ORAL) Take 1 tablet by mouth daily as needed (headache).    fluticasone propionate (FLONASE) 50 mcg/actuation nasal spray 1 spray by Each Nostril route daily as needed for Allergies.    folic acid (FOLVITE) 1 MG tablet Take 1,000 mcg by mouth once daily.    mirtazapine (REMERON) 7.5 MG Tab Take 7.5 mg by mouth once daily.    thiamine 100 MG tablet Take 100 mg by mouth once daily.    [DISCONTINUED] lisinopril-hydrochlorothiazide (PRINZIDE,ZESTORETIC) 10-12.5 mg per tablet Take 1 tablet by mouth once daily.     Family History    None       Tobacco Use    Smoking status: Every Day     Packs/day: 0.50     Types: Cigarettes    Smokeless tobacco: Never   Substance and Sexual Activity    Alcohol use: Yes     Alcohol/week: 10.0 standard drinks     Types: 10 Cans of beer per week     Comment: daily  Gin and cranberry juice    Drug use: No    Sexual activity: Not Currently     Review of Systems   Constitutional:  Negative for activity change, chills, diaphoresis, fatigue and fever.   HENT:  Negative for congestion, facial swelling, rhinorrhea and sore throat.    Eyes:  Negative for photophobia, itching and visual disturbance.   Respiratory:   Negative for cough, chest tightness, shortness of breath and wheezing.    Cardiovascular:  Negative for chest pain, palpitations and leg swelling.   Gastrointestinal:  Negative for abdominal distention, abdominal pain, blood in stool, constipation, diarrhea, nausea and vomiting.   Genitourinary:  Positive for scrotal swelling and testicular pain. Negative for decreased urine volume, difficulty urinating, dysuria, flank pain, frequency, genital sores, hematuria, penile discharge, penile pain, penile swelling and urgency.   Musculoskeletal:  Negative for arthralgias, back pain and neck stiffness.   Neurological:  Negative for dizziness, tremors, seizures, syncope, weakness, light-headedness, numbness and headaches.   Psychiatric/Behavioral:  Negative for agitation, confusion and hallucinations.    Objective:     Vital Signs (Most Recent):  Temp: 97.6 °F (36.4 °C) (07/12/23 0859)  Pulse: 69 (07/12/23 1532)  Resp: 18 (07/12/23 1429)  BP: (!) 145/93 (07/12/23 1532)  SpO2: 96 % (07/12/23 1532) Vital Signs (24h Range):  Temp:  [97.6 °F (36.4 °C)] 97.6 °F (36.4 °C)  Pulse:  [66-89] 69  Resp:  [16-22] 18  SpO2:  [95 %-100 %] 96 %  BP: (126-154)/(60-93) 145/93     Weight: 86.2 kg (190 lb)  Body mass index is 28.06 kg/m².     Physical Exam  Vitals and nursing note reviewed.   Constitutional:       General: He is not in acute distress.     Appearance: He is well-developed. He is not diaphoretic.   HENT:      Head: Normocephalic and atraumatic.      Right Ear: External ear normal.      Left Ear: External ear normal.      Nose: Nose normal. No congestion.      Mouth/Throat:      Pharynx: Oropharynx is clear.   Eyes:      General: No scleral icterus.     Extraocular Movements: Extraocular movements intact.   Cardiovascular:      Rate and Rhythm: Normal rate and regular rhythm.      Pulses: Normal pulses.      Heart sounds: Normal heart sounds. No murmur heard.  Pulmonary:      Effort: Pulmonary effort is normal. No respiratory  distress.      Breath sounds: Normal breath sounds. No wheezing or rales.   Abdominal:      General: Bowel sounds are normal. There is no distension.      Palpations: Abdomen is soft.      Tenderness: There is no abdominal tenderness. There is no guarding or rebound.   Genitourinary:     Penis: Uncircumcised.       Testes:         Left: Tenderness and swelling present.   Musculoskeletal:      Cervical back: Normal range of motion.      Right lower leg: No edema.      Left lower leg: No edema.   Skin:     General: Skin is warm and dry.      Capillary Refill: Capillary refill takes less than 2 seconds.   Neurological:      General: No focal deficit present.      Mental Status: He is alert and oriented to person, place, and time. Mental status is at baseline.   Psychiatric:         Mood and Affect: Mood normal.         Behavior: Behavior normal.         Thought Content: Thought content normal.              Significant Labs: All pertinent labs within the past 24 hours have been reviewed.  CBC:   Recent Labs   Lab 07/12/23  1027   WBC 24.84*   HGB 14.5   HCT 41.6        CMP:   Recent Labs   Lab 07/12/23  1027      K 3.0*      CO2 16*   *   BUN 7*   CREATININE 1.0   CALCIUM 8.8   PROT 7.1   ALBUMIN 3.4*   BILITOT 0.7   ALKPHOS 96   AST 16   ALT 9*   ANIONGAP 15     Lactic Acid:   Recent Labs   Lab 07/12/23  1027   LACTATE 5.4*     Urine Studies:   Recent Labs   Lab 07/12/23  1417   COLORU Yellow   APPEARANCEUA Clear   PHUR 5.0   SPECGRAV 1.015   PROTEINUA Negative   GLUCUA Negative   KETONESU Negative   BILIRUBINUA Negative   OCCULTUA Negative   NITRITE Negative   LEUKOCYTESUR 2+*   RBCUA 1   WBCUA 26*   SQUAMEPITHEL 3       Significant Imaging: I have reviewed all pertinent imaging results/findings within the past 24 hours.  Imaging Results               US Scrotum And Testicles (Final result)  Result time 07/12/23 12:18:43      Final result by Isra Stacy MD (07/12/23 12:18:43)                    Impression:      Left epididymo-orchitis, with a small reactive hydrocele.    Other findings as described.    This report was flagged in Epic as abnormal.      Electronically signed by: Isra Stacy  Date:    07/12/2023  Time:    12:18               Narrative:    EXAMINATION:  US SCROTUM AND TESTICLES    CLINICAL HISTORY:  Scrotal pain    TECHNIQUE:  Sonography of the scrotum and testes.    COMPARISON:  Scrotal ultrasound 1/15/2019    FINDINGS:  Right Testicle:    *Size: 4.6 x 3.0 x 2.4 cm cm  *Appearance: Normal.  *Flow: Normal arterial and venous flow  *Epididymis: 4 mm cystic lesion in the epididymal head, likely a cyst or spermatocele.  *Hydrocele: None.  Unchanged 5 mm scrotal alejandro.  *Varicocele: Present.  Left Testicle:    *Size: 4.7 x 3.4 x 3.2 cm cm  *Appearance: No focal lesion.  *Flow: Arterial and venous flow is detected.  There is increased vascularity in the testicle and epididymis.  *Epididymis: Engorged.  Small cystic lesions in the epididymal head and body measuring up to 4 mm, likely cysts or spermatoceles.  *Hydrocele: Small.  *Varicocele: Present.  Other findings: Thickening of the left hemiscrotum, measuring 5 mm compared to 3 mm on the right.

## 2023-07-12 NOTE — HPI
Mr. Friedman is a 73-year-old male with a history of hypertension, complaining of left groin pain, onset yesterday upon waking, sudden, and pain severe and constant since that time. He denies any urinary symptoms, no penile discharge. He has no fevers, chills, nausea, vomiting and constipation. Patient denies any sexual activity in 5 years. His past sexual relationships have all been with women. He reports remote history of STI when he was a teenager but does not remember what the diagnosis was and endorses treatment and resolution of STI. Denies any SOB, chest pain, abdominal distention, pelvic masses, lower extremity swelling.    In ED, Pt AFVSS. Leukocytosis 24.84. K 3.0, . LA 5.4. Hepatitis C Ab reactive. HIV negative. UA w/ 2+ leukocytes, WBC 26. US scrotum and testicles: Left epididymo-orchitis, with a small reactive hydrocele. Initially treated w/ tylenol 1 g, LR 1 L bolus, IV zosyn, K replaced. Urology consulted.

## 2023-07-12 NOTE — ED PROVIDER NOTES
Encounter Date: 7/12/2023       History     Chief Complaint   Patient presents with    Groin Pain     Left sided groin pain x 1 day     73-year-old male with a history of hypertension, complaining of left groin pain, onset yesterday, sudden, and pain severe and constant since that time.  He denies any urinary symptoms, no penile discharge.  He has no nausea, no vomiting and no constipation.  Patient reports that he is not been sexually active in 3 years.  Denies any abdominal distention.    The history is provided by the patient.   Review of patient's allergies indicates:  No Known Allergies  Past Medical History:   Diagnosis Date    Essential hypertension 1/14/2019     Past Surgical History:   Procedure Laterality Date    COLONOSCOPY N/A 6/15/2022    Procedure: COLONOSCOPY;  Surgeon: Serg Turner MD;  Location: [facility] ([address] [place] [address]);  Service: Endoscopy;  Laterality: N/A;    SKIN GRAFT       History reviewed. No pertinent family history.  Social History     Tobacco Use    Smoking status: Every Day     Packs/day: 0.50     Types: Cigarettes    Smokeless tobacco: Never   Substance Use Topics    Alcohol use: Yes     Alcohol/week: 10.0 standard drinks     Types: 10 Cans of beer per week     Comment: daily  Gin and cranberry juice    Drug use: No     Review of Systems    Physical Exam     Initial Vitals [07/12/23 0859]   BP Pulse Resp Temp SpO2   138/63 89 (!) 22 97.6 °F (36.4 °C) 95 %      MAP       --         Physical Exam    Nursing note and vitals reviewed.  Constitutional: Vital signs are normal. He appears well-developed and well-nourished. He is not diaphoretic.  Non-toxic appearance. He does not appear ill. He appears distressed.   HENT:   Head: Normocephalic and atraumatic.   Mouth/Throat: Mucous membranes are normal. Mucous membranes are not dry.   Eyes: Conjunctivae and lids are normal.   Neck: Neck supple.   Normal range of motion.  Cardiovascular:  Normal rate.           Pulmonary/Chest: No respiratory  distress.   Abdominal: Abdomen is soft. He exhibits no distension. There is no abdominal tenderness. There is no rebound.   Genitourinary:    Penis normal.   Left testis shows swelling and tenderness.       Musculoskeletal:      Cervical back: Normal range of motion and neck supple.     Neurological: He is alert and oriented to person, place, and time.   Skin: Skin is dry and intact. No pallor.   Psychiatric: He has a normal mood and affect. His speech is normal and behavior is normal.       ED Course   Procedures  Labs Reviewed   HEPATITIS C ANTIBODY - Abnormal; Notable for the following components:       Result Value    Hepatitis C Ab Reactive (*)     All other components within normal limits    Narrative:     Release to patient->Immediate   CBC W/ AUTO DIFFERENTIAL - Abnormal; Notable for the following components:    WBC 24.84 (*)     RBC 4.17 (*)      (*)     MCH 34.8 (*)     MPV 9.0 (*)     Gran % 89.0 (*)     Lymph % 4.0 (*)     Mono % 1.0 (*)     All other components within normal limits   COMPREHENSIVE METABOLIC PANEL - Abnormal; Notable for the following components:    Potassium 3.0 (*)     CO2 16 (*)     Glucose 198 (*)     BUN 7 (*)     Albumin 3.4 (*)     ALT 9 (*)     All other components within normal limits   URINALYSIS, REFLEX TO URINE CULTURE - Abnormal; Notable for the following components:    Leukocytes, UA 2+ (*)     All other components within normal limits    Narrative:     Specimen Source->Urine   LACTIC ACID, PLASMA - Abnormal; Notable for the following components:    Lactate (Lactic Acid) 5.4 (*)     All other components within normal limits   URINALYSIS MICROSCOPIC - Abnormal; Notable for the following components:    WBC, UA 26 (*)     All other components within normal limits    Narrative:     Specimen Source->Urine   CULTURE, BLOOD   CULTURE, BLOOD   CULTURE, URINE   HIV 1 / 2 ANTIBODY    Narrative:     Release to patient->Immediate   LIPASE   LACTIC ACID, PLASMA   HEPATITIS C RNA,  QUANTITATIVE, PCR   ISTAT LACTATE          Imaging Results               US Scrotum And Testicles (Final result)  Result time 07/12/23 12:18:43      Final result by Isra Stacy MD (07/12/23 12:18:43)                   Impression:      Left epididymo-orchitis, with a small reactive hydrocele.    Other findings as described.    This report was flagged in Epic as abnormal.      Electronically signed by: Isra Stacy  Date:    07/12/2023  Time:    12:18               Narrative:    EXAMINATION:  US SCROTUM AND TESTICLES    CLINICAL HISTORY:  Scrotal pain    TECHNIQUE:  Sonography of the scrotum and testes.    COMPARISON:  Scrotal ultrasound 1/15/2019    FINDINGS:  Right Testicle:    *Size: 4.6 x 3.0 x 2.4 cm cm  *Appearance: Normal.  *Flow: Normal arterial and venous flow  *Epididymis: 4 mm cystic lesion in the epididymal head, likely a cyst or spermatocele.  *Hydrocele: None.  Unchanged 5 mm scrotal alejandro.  *Varicocele: Present.  Left Testicle:    *Size: 4.7 x 3.4 x 3.2 cm cm  *Appearance: No focal lesion.  *Flow: Arterial and venous flow is detected.  There is increased vascularity in the testicle and epididymis.  *Epididymis: Engorged.  Small cystic lesions in the epididymal head and body measuring up to 4 mm, likely cysts or spermatoceles.  *Hydrocele: Small.  *Varicocele: Present.  Other findings: Thickening of the left hemiscrotum, measuring 5 mm compared to 3 mm on the right.                                       Medications   sodium chloride 0.9% flush 5 mL (has no administration in time range)   albuterol-ipratropium 2.5 mg-0.5 mg/3 mL nebulizer solution 3 mL (has no administration in time range)   melatonin tablet 6 mg (has no administration in time range)   ondansetron disintegrating tablet 4 mg (has no administration in time range)   prochlorperazine injection Soln 5 mg (has no administration in time range)   simethicone chewable tablet 80 mg (has no administration in time range)   aluminum-magnesium  hydroxide-simethicone 200-200-20 mg/5 mL suspension 30 mL (has no administration in time range)   acetaminophen tablet 650 mg (has no administration in time range)   acetaminophen tablet 1,000 mg (has no administration in time range)   naloxone 0.4 mg/mL injection 0.02 mg (has no administration in time range)   glucose chewable tablet 16 g (has no administration in time range)   glucose chewable tablet 24 g (has no administration in time range)   glucagon (human recombinant) injection 1 mg (has no administration in time range)   nicotine 14 mg/24 hr 1 patch (has no administration in time range)   HYDROcodone-acetaminophen 5-325 mg per tablet 1 tablet (has no administration in time range)   dextrose 10% bolus 125 mL 125 mL (has no administration in time range)   dextrose 10% bolus 250 mL 250 mL (has no administration in time range)   piperacillin-tazobactam (ZOSYN) 4.5 g in dextrose 5 % in water (D5W) 5 % 100 mL IVPB (MB+) (has no administration in time range)   amLODIPine tablet 10 mg (has no administration in time range)   folic acid tablet 1,000 mcg (has no administration in time range)   mirtazapine tablet 7.5 mg (has no administration in time range)   thiamine tablet 100 mg (has no administration in time range)   vitamin D 1000 units tablet 1,000 Units (has no administration in time range)   morphine injection 6 mg (6 mg Intravenous Given 7/12/23 1036)   acetaminophen tablet 1,000 mg (1,000 mg Oral Given 7/12/23 1037)   potassium bicarbonate disintegrating tablet 25 mEq (25 mEq Oral Given 7/12/23 1308)   piperacillin-tazobactam (ZOSYN) 4.5 g in dextrose 5 % in water (D5W) 5 % 100 mL IVPB (MB+) (0 g Intravenous Stopped 7/12/23 1416)   lactated ringers bolus 1,000 mL (0 mLs Intravenous Stopped 7/12/23 1416)     Medical Decision Making:   History:   Old Medical Records: I decided to obtain old medical records.  Old Records Summarized: records from clinic visits and records from previous admission(s).  Differential  Diagnosis:   Orchitis/epididymitis versus hernia  Clinical Tests:   Lab Tests: Ordered and Reviewed  Radiological Study: Reviewed and Ordered  ED Management:  Labs  IV pain meds  Will start with scrotal ultrasound and if unrevealing will proceed with CT abdomen pelvis          Attending Attestation:         Attending Critical Care:   Critical Care Times:   ==============================================================  Total Critical Care Time - exclusive of procedural time: 35 minutes.  ==============================================================  Critical care was necessary to treat or prevent imminent or life-threatening deterioration of the following conditions: sepsis.   The following critical care procedures were done by me (see procedure notes): pulse oximetry.   Critical care was time spent personally by me on the following activities: obtaining history from patient or relative, examination of patient, review of old charts, review of x-rays / CT sent with the patient, ordering lab, x-rays, and/or EKG, development of treatment plan with patient or relative, ordering and performing treatments and interventions, evaluation of patient's response to treatment, discussion with consultants and re-evaluation of patient's conition.   Critical Care Condition: potentially life-threatening         ED Course as of 07/12/23 1627   Wed Jul 12, 2023   1155 Potassium(!): 3.0 [AS]   1155 WBC(!): 24.84 [AS]   1155 Lactate, Ryan(!!): 5.4  Notified charge nurse.  Pt needs an ED bed STAT.   [AS]   1239 US Scrotum And Testicles(!)  Ultrasound findings noted.  Will consult Urology and admit patient to Hospital Medicine for IV antibiotic [AS]      ED Course User Index  [AS] Ginette Swenson MD                 Clinical Impression:   Final diagnoses:  [N50.82] Scrotum pain  [N45.2] Orchitis (Primary)        ED Disposition Condition    Observation                 Ginette Swenson MD  07/12/23 0623

## 2023-07-12 NOTE — H&P
[facility]  History & Physical    Patient Name: Brian Friedman  MRN: [id number]  Patient Class: OP- Observation  Admission Date: 7/12/2023  Attending Physician: Talib Castrejon MD   Primary Care Provider: Va Martínez MD         Patient information was obtained from patient, past medical records and ER records.     Subjective:     Principal Problem:Orchitis    Chief Complaint:   Chief Complaint   Patient presents with    Groin Pain     Left sided groin pain x 1 day        HPI: Mr. Friedman is a 73-year-old male with a history of hypertension, complaining of left groin pain, onset yesterday upon waking, sudden, and pain severe and constant since that time. He denies any urinary symptoms, no penile discharge. He has no fevers, chills, nausea, vomiting and constipation. Patient denies any sexual activity in 5 years. His past sexual relationships have all been with women. He reports remote history of STI when he was a teenager but does not remember what the diagnosis was and endorses treatment and resolution of STI. Denies any SOB, chest pain, abdominal distention, pelvic masses, lower extremity swelling.    In ED, Pt AFVSS. Leukocytosis 24.84. K 3.0, . LA 5.4. Hepatitis C Ab reactive. HIV negative. UA w/ 2+ leukocytes, WBC 26. US scrotum and testicles: Left epididymo-orchitis, with a small reactive hydrocele. Initially treated w/ tylenol 1 g, LR 1 L bolus, IV zosyn, K replaced. Urology consulted.      Past Medical History:   Diagnosis Date    Essential hypertension 1/14/2019       Past Surgical History:   Procedure Laterality Date    COLONOSCOPY N/A 6/15/2022    Procedure: COLONOSCOPY;  Surgeon: Serg Turner MD;  Location: [address]);  Service: Endoscopy;  Laterality: N/A;    SKIN GRAFT         Review of patient's allergies indicates:  No Known Allergies    No current facility-administered medications on file prior to encounter.     Current Outpatient  Medications on File Prior to Encounter   Medication Sig    albuterol (PROVENTIL/VENTOLIN HFA) 90 mcg/actuation inhaler Inhale 2 puffs into the lungs every 4 (four) hours as needed for Wheezing or Shortness of Breath.    amLODIPine (NORVASC) 10 MG tablet Take 10 mg by mouth once daily.    aspirin/acetaminophen (GOODY'S BODY PAIN ORAL) Take 1 tablet by mouth daily as needed (headache).    fluticasone propionate (FLONASE) 50 mcg/actuation nasal spray 1 spray by Each Nostril route daily as needed for Allergies.    folic acid (FOLVITE) 1 MG tablet Take 1,000 mcg by mouth once daily.    mirtazapine (REMERON) 7.5 MG Tab Take 7.5 mg by mouth once daily.    thiamine 100 MG tablet Take 100 mg by mouth once daily.    [DISCONTINUED] lisinopril-hydrochlorothiazide (PRINZIDE,ZESTORETIC) 10-12.5 mg per tablet Take 1 tablet by mouth once daily.     Family History    None       Tobacco Use    Smoking status: Every Day     Packs/day: 0.50     Types: Cigarettes    Smokeless tobacco: Never   Substance and Sexual Activity    Alcohol use: Yes     Alcohol/week: 10.0 standard drinks     Types: 10 Cans of beer per week     Comment: daily  Gin and cranberry juice    Drug use: No    Sexual activity: Not Currently     Review of Systems   Constitutional:  Negative for activity change, chills, diaphoresis, fatigue and fever.   HENT:  Negative for congestion, facial swelling, rhinorrhea and sore throat.    Eyes:  Negative for photophobia, itching and visual disturbance.   Respiratory:  Negative for cough, chest tightness, shortness of breath and wheezing.    Cardiovascular:  Negative for chest pain, palpitations and leg swelling.   Gastrointestinal:  Negative for abdominal distention, abdominal pain, blood in stool, constipation, diarrhea, nausea and vomiting.   Genitourinary:  Positive for scrotal swelling and testicular pain. Negative for decreased urine volume, difficulty urinating, dysuria, flank pain, frequency, genital sores,  hematuria, penile discharge, penile pain, penile swelling and urgency.   Musculoskeletal:  Negative for arthralgias, back pain and neck stiffness.   Neurological:  Negative for dizziness, tremors, seizures, syncope, weakness, light-headedness, numbness and headaches.   Psychiatric/Behavioral:  Negative for agitation, confusion and hallucinations.    Objective:     Vital Signs (Most Recent):  Temp: 97.6 °F (36.4 °C) (07/12/23 0859)  Pulse: 69 (07/12/23 1532)  Resp: 18 (07/12/23 1429)  BP: (!) 145/93 (07/12/23 1532)  SpO2: 96 % (07/12/23 1532) Vital Signs (24h Range):  Temp:  [97.6 °F (36.4 °C)] 97.6 °F (36.4 °C)  Pulse:  [66-89] 69  Resp:  [16-22] 18  SpO2:  [95 %-100 %] 96 %  BP: (126-154)/(60-93) 145/93     Weight: 86.2 kg (190 lb)  Body mass index is 28.06 kg/m².     Physical Exam  Vitals and nursing note reviewed.   Constitutional:       General: He is not in acute distress.     Appearance: He is well-developed. He is not diaphoretic.   HENT:      Head: Normocephalic and atraumatic.      Right Ear: External ear normal.      Left Ear: External ear normal.      Nose: Nose normal. No congestion.      Mouth/Throat:      Pharynx: Oropharynx is clear.   Eyes:      General: No scleral icterus.     Extraocular Movements: Extraocular movements intact.   Cardiovascular:      Rate and Rhythm: Normal rate and regular rhythm.      Pulses: Normal pulses.      Heart sounds: Normal heart sounds. No murmur heard.  Pulmonary:      Effort: Pulmonary effort is normal. No respiratory distress.      Breath sounds: Normal breath sounds. No wheezing or rales.   Abdominal:      General: Bowel sounds are normal. There is no distension.      Palpations: Abdomen is soft.      Tenderness: There is no abdominal tenderness. There is no guarding or rebound.   Genitourinary:     Penis: Uncircumcised.       Testes:         Left: Tenderness and swelling present.   Musculoskeletal:      Cervical back: Normal range of motion.      Right lower leg:  No edema.      Left lower leg: No edema.   Skin:     General: Skin is warm and dry.      Capillary Refill: Capillary refill takes less than 2 seconds.   Neurological:      General: No focal deficit present.      Mental Status: He is alert and oriented to person, place, and time. Mental status is at baseline.   Psychiatric:         Mood and Affect: Mood normal.         Behavior: Behavior normal.         Thought Content: Thought content normal.              Significant Labs: All pertinent labs within the past 24 hours have been reviewed.  CBC:   Recent Labs   Lab 07/12/23  1027   WBC 24.84*   HGB 14.5   HCT 41.6        CMP:   Recent Labs   Lab 07/12/23  1027      K 3.0*      CO2 16*   *   BUN 7*   CREATININE 1.0   CALCIUM 8.8   PROT 7.1   ALBUMIN 3.4*   BILITOT 0.7   ALKPHOS 96   AST 16   ALT 9*   ANIONGAP 15     Lactic Acid:   Recent Labs   Lab 07/12/23  1027   LACTATE 5.4*     Urine Studies:   Recent Labs   Lab 07/12/23  1417   COLORU Yellow   APPEARANCEUA Clear   PHUR 5.0   SPECGRAV 1.015   PROTEINUA Negative   GLUCUA Negative   KETONESU Negative   BILIRUBINUA Negative   OCCULTUA Negative   NITRITE Negative   LEUKOCYTESUR 2+*   RBCUA 1   WBCUA 26*   SQUAMEPITHEL 3       Significant Imaging: I have reviewed all pertinent imaging results/findings within the past 24 hours.  Imaging Results               US Scrotum And Testicles (Final result)  Result time 07/12/23 12:18:43      Final result by Isra Stacy MD (07/12/23 12:18:43)                   Impression:      Left epididymo-orchitis, with a small reactive hydrocele.    Other findings as described.    This report was flagged in Epic as abnormal.      Electronically signed by: Isra Stacy  Date:    07/12/2023  Time:    12:18               Narrative:    EXAMINATION:  US SCROTUM AND TESTICLES    CLINICAL HISTORY:  Scrotal pain    TECHNIQUE:  Sonography of the scrotum and testes.    COMPARISON:  Scrotal ultrasound  1/15/2019    FINDINGS:  Right Testicle:    *Size: 4.6 x 3.0 x 2.4 cm cm  *Appearance: Normal.  *Flow: Normal arterial and venous flow  *Epididymis: 4 mm cystic lesion in the epididymal head, likely a cyst or spermatocele.  *Hydrocele: None.  Unchanged 5 mm scrotal alejandro.  *Varicocele: Present.  Left Testicle:    *Size: 4.7 x 3.4 x 3.2 cm cm  *Appearance: No focal lesion.  *Flow: Arterial and venous flow is detected.  There is increased vascularity in the testicle and epididymis.  *Epididymis: Engorged.  Small cystic lesions in the epididymal head and body measuring up to 4 mm, likely cysts or spermatoceles.  *Hydrocele: Small.  *Varicocele: Present.  Other findings: Thickening of the left hemiscrotum, measuring 5 mm compared to 3 mm on the right.                                       Assessment/Plan:     * Orchitis  Sepsis  Acute cystitis  This patient does have evidence of infective focus  My overall impression is sepsis.  Source: Urinary Tract  Antibiotics given-   Antibiotics (72h ago, onward)    None        Latest lactate reviewed-  Recent Labs   Lab 07/12/23  1027 07/12/23  1420   LACTATE 5.4*  --    POCLAC  --  1.51     Source control achieved by: IV zosyn    - AFVSS  - Leukocytosis 24.84  - LA 5.4, trending  - HIV negative. STD panel ordered  - UA w/ 2+ leukocytes, WBC 26  - US scrotum and testicles: Left epididymo-orchitis, with a small reactive hydrocele.  - s/p tylenol 1 g, continue prn  - LR 1 L bolus  - IV zosyn  - Urology consulted in ED   -scrotal US and physical exam concerning for L orchitis   -recommend Levaquin, scrotal support, NSAIDS, ice packs  - transrectal US ordered  - PSA    Vitamin D deficiency  - continue vit D supp daily    Essential hypertension  - amlodipine 10 mg qd      VTE Risk Mitigation (From admission, onward)         Ordered     IP VTE LOW RISK PATIENT  Once         07/12/23 1452                     On 07/12/2023, patient should be placed in hospital observation services under my  care in collaboration with Dr. Talib Castrejon.      Polo Friedman PA-C  Department of Hospital Medicine  [facility] - Emergency Dept

## 2023-07-13 PROBLEM — E87.20 LACTIC ACID INCREASED: Status: ACTIVE | Noted: 2023-07-13

## 2023-07-13 PROBLEM — E87.6 HYPOKALEMIA: Status: ACTIVE | Noted: 2023-07-13

## 2023-07-13 PROBLEM — D72.829 LEUKOCYTOSIS: Status: ACTIVE | Noted: 2023-07-13

## 2023-07-13 PROBLEM — D63.8 ANEMIA OF CHRONIC DISEASE: Status: ACTIVE | Noted: 2023-07-13

## 2023-07-13 PROBLEM — E87.20 METABOLIC ACIDOSIS: Status: ACTIVE | Noted: 2023-07-13

## 2023-07-13 LAB
ALBUMIN SERPL BCP-MCNC: 2.7 G/DL (ref 3.5–5.2)
ALP SERPL-CCNC: 94 U/L (ref 55–135)
ALT SERPL W/O P-5'-P-CCNC: 9 U/L (ref 10–44)
ANION GAP SERPL CALC-SCNC: 13 MMOL/L (ref 8–16)
AST SERPL-CCNC: 12 U/L (ref 10–40)
BACTERIA UR CULT: NO GROWTH
BASOPHILS # BLD AUTO: 0.03 K/UL (ref 0–0.2)
BASOPHILS NFR BLD: 0.2 % (ref 0–1.9)
BILIRUB SERPL-MCNC: 0.7 MG/DL (ref 0.1–1)
BUN SERPL-MCNC: 5 MG/DL (ref 8–23)
CALCIUM SERPL-MCNC: 8.5 MG/DL (ref 8.7–10.5)
CHLORIDE SERPL-SCNC: 106 MMOL/L (ref 95–110)
CO2 SERPL-SCNC: 19 MMOL/L (ref 23–29)
COMPLEXED PSA SERPL-MCNC: 0.56 NG/ML (ref 0–4)
CREAT SERPL-MCNC: 0.8 MG/DL (ref 0.5–1.4)
DIFFERENTIAL METHOD: ABNORMAL
EOSINOPHIL # BLD AUTO: 0.1 K/UL (ref 0–0.5)
EOSINOPHIL NFR BLD: 0.3 % (ref 0–8)
ERYTHROCYTE [DISTWIDTH] IN BLOOD BY AUTOMATED COUNT: 12.9 % (ref 11.5–14.5)
EST. GFR  (NO RACE VARIABLE): >60 ML/MIN/1.73 M^2
FERRITIN SERPL-MCNC: 484 NG/ML (ref 20–300)
FOLATE SERPL-MCNC: 9.7 NG/ML (ref 4–24)
GLUCOSE SERPL-MCNC: 82 MG/DL (ref 70–110)
HCT VFR BLD AUTO: 38.7 % (ref 40–54)
HCV RNA SERPL QL NAA+PROBE: NOT DETECTED
HCV RNA SPEC NAA+PROBE-ACNC: NOT DETECTED IU/ML
HGB BLD-MCNC: 13.7 G/DL (ref 14–18)
IMM GRANULOCYTES # BLD AUTO: 0.27 K/UL (ref 0–0.04)
IMM GRANULOCYTES NFR BLD AUTO: 1.4 % (ref 0–0.5)
IRON SERPL-MCNC: 19 UG/DL (ref 45–160)
LYMPHOCYTES # BLD AUTO: 1.8 K/UL (ref 1–4.8)
LYMPHOCYTES NFR BLD: 9.4 % (ref 18–48)
MAGNESIUM SERPL-MCNC: 1.7 MG/DL (ref 1.6–2.6)
MCH RBC QN AUTO: 35 PG (ref 27–31)
MCHC RBC AUTO-ENTMCNC: 35.4 G/DL (ref 32–36)
MCV RBC AUTO: 99 FL (ref 82–98)
MONOCYTES # BLD AUTO: 1.1 K/UL (ref 0.3–1)
MONOCYTES NFR BLD: 5.4 % (ref 4–15)
NEUTROPHILS # BLD AUTO: 16.1 K/UL (ref 1.8–7.7)
NEUTROPHILS NFR BLD: 83.3 % (ref 38–73)
NRBC BLD-RTO: 0 /100 WBC
PLATELET # BLD AUTO: 184 K/UL (ref 150–450)
PMV BLD AUTO: 9.6 FL (ref 9.2–12.9)
POTASSIUM SERPL-SCNC: 3 MMOL/L (ref 3.5–5.1)
PROT SERPL-MCNC: 6.1 G/DL (ref 6–8.4)
RBC # BLD AUTO: 3.91 M/UL (ref 4.6–6.2)
SATURATED IRON: 9 % (ref 20–50)
SODIUM SERPL-SCNC: 138 MMOL/L (ref 136–145)
TOTAL IRON BINDING CAPACITY: 207 UG/DL (ref 250–450)
TRANSFERRIN SERPL-MCNC: 140 MG/DL (ref 200–375)
VIT B12 SERPL-MCNC: 365 PG/ML (ref 210–950)
WBC # BLD AUTO: 19.34 K/UL (ref 3.9–12.7)

## 2023-07-13 PROCEDURE — 63600175 PHARM REV CODE 636 W HCPCS: Performed by: INTERNAL MEDICINE

## 2023-07-13 PROCEDURE — 84466 ASSAY OF TRANSFERRIN: CPT | Performed by: PHYSICIAN ASSISTANT

## 2023-07-13 PROCEDURE — 25000003 PHARM REV CODE 250: Performed by: INTERNAL MEDICINE

## 2023-07-13 PROCEDURE — 83735 ASSAY OF MAGNESIUM: CPT

## 2023-07-13 PROCEDURE — 96365 THER/PROPH/DIAG IV INF INIT: CPT | Mod: 59

## 2023-07-13 PROCEDURE — 25000003 PHARM REV CODE 250

## 2023-07-13 PROCEDURE — 11000001 HC ACUTE MED/SURG PRIVATE ROOM

## 2023-07-13 PROCEDURE — 82607 VITAMIN B-12: CPT

## 2023-07-13 PROCEDURE — 36415 COLL VENOUS BLD VENIPUNCTURE: CPT | Performed by: PHYSICIAN ASSISTANT

## 2023-07-13 PROCEDURE — 96368 THER/DIAG CONCURRENT INF: CPT

## 2023-07-13 PROCEDURE — 80053 COMPREHEN METABOLIC PANEL: CPT

## 2023-07-13 PROCEDURE — 99233 SBSQ HOSP IP/OBS HIGH 50: CPT | Mod: ,,,

## 2023-07-13 PROCEDURE — 25000003 PHARM REV CODE 250: Performed by: PHYSICIAN ASSISTANT

## 2023-07-13 PROCEDURE — 96361 HYDRATE IV INFUSION ADD-ON: CPT

## 2023-07-13 PROCEDURE — 99233 PR SUBSEQUENT HOSPITAL CARE,LEVL III: ICD-10-PCS | Mod: ,,,

## 2023-07-13 PROCEDURE — 84153 ASSAY OF PSA TOTAL: CPT | Performed by: PHYSICIAN ASSISTANT

## 2023-07-13 PROCEDURE — 63600175 PHARM REV CODE 636 W HCPCS

## 2023-07-13 PROCEDURE — 96366 THER/PROPH/DIAG IV INF ADDON: CPT

## 2023-07-13 PROCEDURE — 82728 ASSAY OF FERRITIN: CPT | Performed by: PHYSICIAN ASSISTANT

## 2023-07-13 PROCEDURE — S4991 NICOTINE PATCH NONLEGEND: HCPCS

## 2023-07-13 PROCEDURE — 85025 COMPLETE CBC W/AUTO DIFF WBC: CPT

## 2023-07-13 PROCEDURE — 82746 ASSAY OF FOLIC ACID SERUM: CPT

## 2023-07-13 RX ORDER — POTASSIUM CHLORIDE 20 MEQ/1
40 TABLET, EXTENDED RELEASE ORAL ONCE
Status: COMPLETED | OUTPATIENT
Start: 2023-07-13 | End: 2023-07-13

## 2023-07-13 RX ADMIN — VANCOMYCIN HYDROCHLORIDE 1750 MG: 10 INJECTION, POWDER, LYOPHILIZED, FOR SOLUTION INTRAVENOUS at 09:07

## 2023-07-13 RX ADMIN — FOLIC ACID 1000 MCG: 1 TABLET ORAL at 09:07

## 2023-07-13 RX ADMIN — Medication 100 MG: at 09:07

## 2023-07-13 RX ADMIN — PIPERACILLIN SODIUM AND TAZOBACTAM SODIUM 4.5 G: 4; .5 INJECTION, POWDER, LYOPHILIZED, FOR SOLUTION INTRAVENOUS at 09:07

## 2023-07-13 RX ADMIN — MIRTAZAPINE 7.5 MG: 7.5 TABLET, FILM COATED ORAL at 09:07

## 2023-07-13 RX ADMIN — VANCOMYCIN HYDROCHLORIDE 1000 MG: 1 INJECTION, POWDER, LYOPHILIZED, FOR SOLUTION INTRAVENOUS at 09:07

## 2023-07-13 RX ADMIN — NICOTINE 1 PATCH: 14 PATCH, EXTENDED RELEASE TRANSDERMAL at 09:07

## 2023-07-13 RX ADMIN — POTASSIUM CHLORIDE 40 MEQ: 1500 TABLET, EXTENDED RELEASE ORAL at 09:07

## 2023-07-13 RX ADMIN — HYDROCODONE BITARTRATE AND ACETAMINOPHEN 1 TABLET: 5; 325 TABLET ORAL at 01:07

## 2023-07-13 RX ADMIN — CHOLECALCIFEROL TAB 25 MCG (1000 UNIT) 1000 UNITS: 25 TAB at 09:07

## 2023-07-13 RX ADMIN — AMLODIPINE BESYLATE 10 MG: 10 TABLET ORAL at 09:07

## 2023-07-13 RX ADMIN — PIPERACILLIN SODIUM AND TAZOBACTAM SODIUM 4.5 G: 4; .5 INJECTION, POWDER, LYOPHILIZED, FOR SOLUTION INTRAVENOUS at 04:07

## 2023-07-13 NOTE — ASSESSMENT & PLAN NOTE
Lactic acid increased  Leukocytosis   Acute cystitis without hematuria   orchitis   - WBC 24>19 tachypnea and fever 3/4 SIRS   - This patient does have evidence of infective focus  - My overall impression is sepsis.  - Source: Orchitis and UTI  - US showing left epididymo-orchitis, with a small reactive hydrocele.    - fevered on broad spec abx, CT CAP pending   - Latest lactate reviewed-  Recent Labs   Lab 07/12/23  1027 07/12/23  1743   LACTATE 5.4* 2.3*   - on broad spectrum vanc and zosyn   - urine culture pending

## 2023-07-13 NOTE — PROGRESS NOTES
Pharmacokinetic Initial Assessment: IV Vancomycin    Assessment/Plan:    Initiate intravenous vancomycin with loading dose of 1750 mg once followed by a maintenance dose of vancomycin 1000mg IV every 12 hours  Desired empiric serum trough concentration is 15 to 20 mcg/mL  Draw vancomycin trough level 60 min prior to third dose on 07/14/23 at approximately 0830  Pharmacy will continue to follow and monitor vancomycin.      Please contact pharmacy at extension [phone] with any questions regarding this assessment.     Thank you for the consult,   Akbar Nation       Patient brief summary:  Brian Friedman is a 73 y.o. male initiated on antimicrobial therapy with IV Vancomycin for treatment of suspected sepsis    Drug Allergies:   Review of patient's allergies indicates:  No Known Allergies    Actual Body Weight:   86.2kg    Renal Function:   Estimated Creatinine Clearance: 73.7 mL/min (based on SCr of 1 mg/dL).,     Dialysis Method (if applicable):  N/A

## 2023-07-13 NOTE — ASSESSMENT & PLAN NOTE
Lab Results   Component Value Date    K 3.0 (L) 07/13/2023   - replaced as indicated  - monitor on bmp

## 2023-07-13 NOTE — PROGRESS NOTES
[facility] [address] [place] [address] Medicine  Progress Note    Patient Name: Brian Friedman  MRN: [id number]  Patient Class: IP- Inpatient   Admission Date: 7/12/2023  Length of Stay: 0 days  Attending Physician: Williams Brown MD  Primary Care Provider: Va Martínez MD        Subjective:     Principal Problem:Orchitis        HPI:  Mr. Friedman is a 73-year-old male with a history of hypertension, complaining of left groin pain, onset yesterday upon waking, sudden, and pain severe and constant since that time. He denies any urinary symptoms, no penile discharge. He has no fevers, chills, nausea, vomiting and constipation. Patient denies any sexual activity in 5 years. His past sexual relationships have all been with women. He reports remote history of STI when he was a teenager but does not remember what the diagnosis was and endorses treatment and resolution of STI. Denies any SOB, chest pain, abdominal distention, pelvic masses, lower extremity swelling.    In ED, Pt AFVSS. Leukocytosis 24.84. K 3.0, . LA 5.4. Hepatitis C Ab reactive. HIV negative. UA w/ 2+ leukocytes, WBC 26. US scrotum and testicles: Left epididymo-orchitis, with a small reactive hydrocele. Initially treated w/ tylenol 1 g, LR 1 L bolus, IV zosyn, K replaced. Urology consulted.      Overview/Hospital Course:  Brian Friedman was admitted to hospital medicine for management of orchitis. Urology consulted, orchitis confirmed but concerned for other underlying infectious processes. Hospital course complicated by patient fevering while on broad spectrum zosyn. CT chest abdomen and pelvis obtained. Antibiotics broadened to include vancomycin.       Interval History:  Febrile to 100.8 F while on IV zosyn this morning. Broadened antibiotics to include vancomycin. CT CAP ordered given concern for other infectious etiology. UA infectious, cultures pending.  Patient evaluated at bedside, uncomfortable appearing. States pain has not  significantly improved from admission. Discussed plan as detailed above, all questions answered.     Review of Systems   Constitutional:  Negative for chills and fever.   Respiratory:  Negative for chest tightness and shortness of breath.    Cardiovascular:  Negative for chest pain and leg swelling.   Gastrointestinal:  Negative for abdominal pain and nausea.   Genitourinary:  Positive for scrotal swelling and testicular pain.   Neurological:  Negative for dizziness and weakness.   Objective:     Vital Signs (Most Recent):  Temp: 99.8 °F (37.7 °C) (07/13/23 1124)  Pulse: 87 (07/13/23 1124)  Resp: 18 (07/13/23 1313)  BP: (!) 146/65 (07/13/23 1124)  SpO2: 95 % (07/13/23 1124) Vital Signs (24h Range):  Temp:  [98.4 °F (36.9 °C)-100.8 °F (38.2 °C)] 99.8 °F (37.7 °C)  Pulse:  [75-87] 87  Resp:  [18-20] 18  SpO2:  [90 %-98 %] 95 %  BP: (137-182)/(62-91) 146/65     Weight: 86.2 kg (190 lb 0.6 oz)  Body mass index is 28.06 kg/m².    Intake/Output Summary (Last 24 hours) at 7/13/2023 1553  Last data filed at 7/13/2023 1313  Gross per 24 hour   Intake --   Output 2150 ml   Net -2150 ml         Physical Exam  Vitals and nursing note reviewed. Exam conducted with a chaperone present.   Constitutional:       General: He is not in acute distress.     Appearance: He is well-developed. He is ill-appearing.   HENT:      Head: Normocephalic and atraumatic.   Eyes:      Extraocular Movements: Extraocular movements intact.   Cardiovascular:      Rate and Rhythm: Normal rate and regular rhythm.      Heart sounds: Normal heart sounds.   Pulmonary:      Effort: Pulmonary effort is normal. No respiratory distress.   Abdominal:      General: There is no distension.   Genitourinary:     Testes:         Right: Tenderness or swelling not present.         Left: Tenderness and swelling present.   Musculoskeletal:         General: No tenderness. Normal range of motion.   Skin:     General: Skin is warm and dry.      Findings: No rash.    Neurological:      Mental Status: He is alert and oriented to person, place, and time.   Psychiatric:         Behavior: Behavior normal.         Thought Content: Thought content normal.         Judgment: Judgment normal.       Significant Labs: All pertinent labs within the past 24 hours have been reviewed.    Significant Imaging: I have reviewed all pertinent imaging results/findings within the past 24 hours.      Assessment/Plan:      * Orchitis  Sepsis  Acute cystitis  This patient does have evidence of infective focus  My overall impression is sepsis.  Source: Urinary Tract  Antibiotics given-   Antibiotics (72h ago, onward)    None        Latest lactate reviewed-  Recent Labs   Lab 07/12/23  1027 07/12/23  1420   LACTATE 5.4*  --    POCLAC  --  1.51     Source control achieved by: IV zosyn    - AFVSS  - Leukocytosis 24.84  - LA 5.4, trending  - HIV negative. STD panel ordered  - UA w/ 2+ leukocytes, WBC 26  - US scrotum and testicles: Left epididymo-orchitis, with a small reactive hydrocele.  - s/p tylenol 1 g, continue prn  - LR 1 L bolus  - IV zosyn  - Urology consulted in ED   -scrotal US and physical exam concerning for L orchitis   -recommend Levaquin, scrotal support, NSAIDS, ice packs  - transrectal US ordered  - PSA    Metabolic acidosis  - likely due to acute illness  - 16>19  - monitor on bmp     Anemia of chronic disease  Patient's anemia is currently controlled. S/p 0 units of PRBCs. Etiology likely d/t chronic disease  Current CBC reviewed-   Lab Results   Component Value Date    HGB 13.7 (L) 07/13/2023    HCT 38.7 (L) 07/13/2023     Monitor serial CBC and transfuse if patient becomes hemodynamically unstable, symptomatic or H/H drops below 7/21.     Hypokalemia  Lab Results   Component Value Date    K 3.0 (L) 07/13/2023   - replaced as indicated  - monitor on bmp     Vitamin D deficiency  - continue vit D supp daily    Essential hypertension  - amlodipine 10 mg qd  - vitals q4h    Severe  sepsis  Lactic acid increased  Leukocytosis   Acute cystitis without hematuria   orchitis   - WBC 24>19 tachypnea and fever 3/4 SIRS   - This patient does have evidence of infective focus  - My overall impression is sepsis.  - Source: Orchitis and UTI  - US showing left epididymo-orchitis, with a small reactive hydrocele.    - fevered on broad spec abx, CT CAP pending   - Latest lactate reviewed-  Recent Labs   Lab 07/12/23  1027 07/12/23  1743   LACTATE 5.4* 2.3*   - on broad spectrum vanc and zosyn   - urine culture pending       VTE Risk Mitigation (From admission, onward)         Ordered     IP VTE LOW RISK PATIENT  Once         07/12/23 1452                Discharge Planning   ERIN: 7/16/2023     Code Status: Full Code   Is the patient medically ready for discharge?: No    Reason for patient still in hospital (select all that apply): Patient trending condition, Laboratory test, Treatment and Imaging  Discharge Plan A: Home                  Janiya Tamayo PA-C  Department of Hospital Medicine   [facility]

## 2023-07-13 NOTE — SUBJECTIVE & OBJECTIVE
Interval History:  Febrile to 100.8 F while on IV zosyn this morning. Broadened antibiotics to include vancomycin. CT CAP ordered given concern for other infectious etiology. UA infectious, cultures pending.  Patient evaluated at bedside, uncomfortable appearing. States pain has not significantly improved from admission. Discussed plan as detailed above, all questions answered.     Review of Systems   Constitutional:  Negative for chills and fever.   Respiratory:  Negative for chest tightness and shortness of breath.    Cardiovascular:  Negative for chest pain and leg swelling.   Gastrointestinal:  Negative for abdominal pain and nausea.   Genitourinary:  Positive for scrotal swelling and testicular pain.   Neurological:  Negative for dizziness and weakness.   Objective:     Vital Signs (Most Recent):  Temp: 99.8 °F (37.7 °C) (07/13/23 1124)  Pulse: 87 (07/13/23 1124)  Resp: 18 (07/13/23 1313)  BP: (!) 146/65 (07/13/23 1124)  SpO2: 95 % (07/13/23 1124) Vital Signs (24h Range):  Temp:  [98.4 °F (36.9 °C)-100.8 °F (38.2 °C)] 99.8 °F (37.7 °C)  Pulse:  [75-87] 87  Resp:  [18-20] 18  SpO2:  [90 %-98 %] 95 %  BP: (137-182)/(62-91) 146/65     Weight: 86.2 kg (190 lb 0.6 oz)  Body mass index is 28.06 kg/m².    Intake/Output Summary (Last 24 hours) at 7/13/2023 1553  Last data filed at 7/13/2023 1313  Gross per 24 hour   Intake --   Output 2150 ml   Net -2150 ml         Physical Exam  Vitals and nursing note reviewed. Exam conducted with a chaperone present.   Constitutional:       General: He is not in acute distress.     Appearance: He is well-developed. He is ill-appearing.   HENT:      Head: Normocephalic and atraumatic.   Eyes:      Extraocular Movements: Extraocular movements intact.   Cardiovascular:      Rate and Rhythm: Normal rate and regular rhythm.      Heart sounds: Normal heart sounds.   Pulmonary:      Effort: Pulmonary effort is normal. No respiratory distress.   Abdominal:      General: There is no  distension.   Genitourinary:     Testes:         Right: Tenderness or swelling not present.         Left: Tenderness and swelling present.   Musculoskeletal:         General: No tenderness. Normal range of motion.   Skin:     General: Skin is warm and dry.      Findings: No rash.   Neurological:      Mental Status: He is alert and oriented to person, place, and time.   Psychiatric:         Behavior: Behavior normal.         Thought Content: Thought content normal.         Judgment: Judgment normal.       Significant Labs: All pertinent labs within the past 24 hours have been reviewed.    Significant Imaging: I have reviewed all pertinent imaging results/findings within the past 24 hours.

## 2023-07-13 NOTE — ASSESSMENT & PLAN NOTE
Patient's anemia is currently controlled. S/p 0 units of PRBCs. Etiology likely d/t chronic disease  Current CBC reviewed-   Lab Results   Component Value Date    HGB 13.7 (L) 07/13/2023    HCT 38.7 (L) 07/13/2023     Monitor serial CBC and transfuse if patient becomes hemodynamically unstable, symptomatic or H/H drops below 7/21.

## 2023-07-13 NOTE — HOSPITAL COURSE
Brian Friedman was admitted to hospital medicine for management of orchitis. Urology consulted, orchitis confirmed but concerned for other underlying infectious processes. Hospital course complicated by patient fevering while on broad spectrum zosyn. Antibiotics broadened to include vancomycin. CT chest abdomen and pelvis obtained that revealed chronic bladder wall thickening likely from outlet obstruction and mild centrilobular emphysema. Patient's pain, swelling and fever improved on broad spectrum abx.  Transitioned to PO Levaquin to complete a 10 day course. Given a refill of his albuterol inhaler for mild intermittent COPD symptoms. Referral to outpatient urology and pulmonology placed prior to discharge. Extensive tobacco cessation counseling given, patient wished to received smoking cessation referral and start nicotine patch taper outpatient. All medications delivered to bedside prior to discharge.      On day of discharge patient's vital signs were stable and patient appeared clinically ready for discharge. Hospital course, discharge plan and return precautions discussed - patient expressed understanding. All questions answered at that time.

## 2023-07-14 LAB
ALBUMIN SERPL BCP-MCNC: 2.6 G/DL (ref 3.5–5.2)
ALP SERPL-CCNC: 107 U/L (ref 55–135)
ALT SERPL W/O P-5'-P-CCNC: 7 U/L (ref 10–44)
ANION GAP SERPL CALC-SCNC: 10 MMOL/L (ref 8–16)
AST SERPL-CCNC: 13 U/L (ref 10–40)
BASOPHILS # BLD AUTO: 0.04 K/UL (ref 0–0.2)
BASOPHILS NFR BLD: 0.3 % (ref 0–1.9)
BILIRUB SERPL-MCNC: 0.6 MG/DL (ref 0.1–1)
BUN SERPL-MCNC: 5 MG/DL (ref 8–23)
CALCIUM SERPL-MCNC: 8.5 MG/DL (ref 8.7–10.5)
CHLORIDE SERPL-SCNC: 105 MMOL/L (ref 95–110)
CO2 SERPL-SCNC: 22 MMOL/L (ref 23–29)
CREAT SERPL-MCNC: 0.8 MG/DL (ref 0.5–1.4)
DIFFERENTIAL METHOD: ABNORMAL
EOSINOPHIL # BLD AUTO: 0.1 K/UL (ref 0–0.5)
EOSINOPHIL NFR BLD: 0.7 % (ref 0–8)
ERYTHROCYTE [DISTWIDTH] IN BLOOD BY AUTOMATED COUNT: 12.8 % (ref 11.5–14.5)
EST. GFR  (NO RACE VARIABLE): >60 ML/MIN/1.73 M^2
GLUCOSE SERPL-MCNC: 97 MG/DL (ref 70–110)
HCT VFR BLD AUTO: 39 % (ref 40–54)
HGB BLD-MCNC: 13.4 G/DL (ref 14–18)
IMM GRANULOCYTES # BLD AUTO: 0.07 K/UL (ref 0–0.04)
IMM GRANULOCYTES NFR BLD AUTO: 0.6 % (ref 0–0.5)
LYMPHOCYTES # BLD AUTO: 1.2 K/UL (ref 1–4.8)
LYMPHOCYTES NFR BLD: 10.1 % (ref 18–48)
MAGNESIUM SERPL-MCNC: 1.7 MG/DL (ref 1.6–2.6)
MCH RBC QN AUTO: 34.4 PG (ref 27–31)
MCHC RBC AUTO-ENTMCNC: 34.4 G/DL (ref 32–36)
MCV RBC AUTO: 100 FL (ref 82–98)
MONOCYTES # BLD AUTO: 0.7 K/UL (ref 0.3–1)
MONOCYTES NFR BLD: 5.5 % (ref 4–15)
NEUTROPHILS # BLD AUTO: 10 K/UL (ref 1.8–7.7)
NEUTROPHILS NFR BLD: 82.8 % (ref 38–73)
NRBC BLD-RTO: 0 /100 WBC
PLATELET # BLD AUTO: 180 K/UL (ref 150–450)
PMV BLD AUTO: 9.5 FL (ref 9.2–12.9)
POTASSIUM SERPL-SCNC: 3.1 MMOL/L (ref 3.5–5.1)
PROT SERPL-MCNC: 6.2 G/DL (ref 6–8.4)
RBC # BLD AUTO: 3.89 M/UL (ref 4.6–6.2)
SODIUM SERPL-SCNC: 137 MMOL/L (ref 136–145)
VANCOMYCIN TROUGH SERPL-MCNC: 7.3 UG/ML (ref 10–22)
WBC # BLD AUTO: 12.11 K/UL (ref 3.9–12.7)

## 2023-07-14 PROCEDURE — 63600175 PHARM REV CODE 636 W HCPCS: Performed by: INTERNAL MEDICINE

## 2023-07-14 PROCEDURE — 63600175 PHARM REV CODE 636 W HCPCS

## 2023-07-14 PROCEDURE — S4991 NICOTINE PATCH NONLEGEND: HCPCS

## 2023-07-14 PROCEDURE — 25000003 PHARM REV CODE 250

## 2023-07-14 PROCEDURE — 83735 ASSAY OF MAGNESIUM: CPT

## 2023-07-14 PROCEDURE — 94640 AIRWAY INHALATION TREATMENT: CPT

## 2023-07-14 PROCEDURE — 99233 PR SUBSEQUENT HOSPITAL CARE,LEVL III: ICD-10-PCS | Mod: ,,,

## 2023-07-14 PROCEDURE — 94761 N-INVAS EAR/PLS OXIMETRY MLT: CPT

## 2023-07-14 PROCEDURE — 36415 COLL VENOUS BLD VENIPUNCTURE: CPT | Performed by: INTERNAL MEDICINE

## 2023-07-14 PROCEDURE — 25000242 PHARM REV CODE 250 ALT 637 W/ HCPCS

## 2023-07-14 PROCEDURE — 36415 COLL VENOUS BLD VENIPUNCTURE: CPT

## 2023-07-14 PROCEDURE — 25000003 PHARM REV CODE 250: Performed by: PHYSICIAN ASSISTANT

## 2023-07-14 PROCEDURE — 99233 SBSQ HOSP IP/OBS HIGH 50: CPT | Mod: ,,,

## 2023-07-14 PROCEDURE — 85025 COMPLETE CBC W/AUTO DIFF WBC: CPT

## 2023-07-14 PROCEDURE — 80202 ASSAY OF VANCOMYCIN: CPT | Performed by: INTERNAL MEDICINE

## 2023-07-14 PROCEDURE — 25500020 PHARM REV CODE 255: Performed by: INTERNAL MEDICINE

## 2023-07-14 PROCEDURE — 25000003 PHARM REV CODE 250: Performed by: INTERNAL MEDICINE

## 2023-07-14 PROCEDURE — 11000001 HC ACUTE MED/SURG PRIVATE ROOM

## 2023-07-14 PROCEDURE — 80053 COMPREHEN METABOLIC PANEL: CPT

## 2023-07-14 RX ORDER — ALBUTEROL SULFATE 90 UG/1
2 AEROSOL, METERED RESPIRATORY (INHALATION) EVERY 6 HOURS PRN
Status: DISCONTINUED | OUTPATIENT
Start: 2023-07-14 | End: 2023-07-15 | Stop reason: HOSPADM

## 2023-07-14 RX ORDER — POTASSIUM CHLORIDE 20 MEQ/1
40 TABLET, EXTENDED RELEASE ORAL ONCE
Status: COMPLETED | OUTPATIENT
Start: 2023-07-14 | End: 2023-07-14

## 2023-07-14 RX ORDER — ALBUTEROL SULFATE 90 UG/1
2 AEROSOL, METERED RESPIRATORY (INHALATION) ONCE
Status: COMPLETED | OUTPATIENT
Start: 2023-07-14 | End: 2023-07-14

## 2023-07-14 RX ADMIN — PIPERACILLIN SODIUM AND TAZOBACTAM SODIUM 4.5 G: 4; .5 INJECTION, POWDER, LYOPHILIZED, FOR SOLUTION INTRAVENOUS at 10:07

## 2023-07-14 RX ADMIN — FOLIC ACID 1000 MCG: 1 TABLET ORAL at 09:07

## 2023-07-14 RX ADMIN — IOHEXOL 100 ML: 350 INJECTION, SOLUTION INTRAVENOUS at 06:07

## 2023-07-14 RX ADMIN — ACETAMINOPHEN 1000 MG: 500 TABLET ORAL at 05:07

## 2023-07-14 RX ADMIN — VANCOMYCIN HYDROCHLORIDE 1000 MG: 1 INJECTION, POWDER, LYOPHILIZED, FOR SOLUTION INTRAVENOUS at 10:07

## 2023-07-14 RX ADMIN — AMLODIPINE BESYLATE 10 MG: 10 TABLET ORAL at 09:07

## 2023-07-14 RX ADMIN — NICOTINE 1 PATCH: 14 PATCH, EXTENDED RELEASE TRANSDERMAL at 09:07

## 2023-07-14 RX ADMIN — CHOLECALCIFEROL TAB 25 MCG (1000 UNIT) 1000 UNITS: 25 TAB at 09:07

## 2023-07-14 RX ADMIN — PIPERACILLIN SODIUM AND TAZOBACTAM SODIUM 4.5 G: 4; .5 INJECTION, POWDER, LYOPHILIZED, FOR SOLUTION INTRAVENOUS at 01:07

## 2023-07-14 RX ADMIN — VANCOMYCIN HYDROCHLORIDE 1250 MG: 1.25 INJECTION, POWDER, LYOPHILIZED, FOR SOLUTION INTRAVENOUS at 09:07

## 2023-07-14 RX ADMIN — PIPERACILLIN SODIUM AND TAZOBACTAM SODIUM 4.5 G: 4; .5 INJECTION, POWDER, LYOPHILIZED, FOR SOLUTION INTRAVENOUS at 05:07

## 2023-07-14 RX ADMIN — MIRTAZAPINE 7.5 MG: 7.5 TABLET, FILM COATED ORAL at 09:07

## 2023-07-14 RX ADMIN — ALBUTEROL SULFATE 2 PUFF: 108 INHALANT RESPIRATORY (INHALATION) at 08:07

## 2023-07-14 RX ADMIN — POTASSIUM CHLORIDE 40 MEQ: 1500 TABLET, EXTENDED RELEASE ORAL at 09:07

## 2023-07-14 RX ADMIN — Medication 100 MG: at 09:07

## 2023-07-14 NOTE — ASSESSMENT & PLAN NOTE
Lab Results   Component Value Date    K 3.1 (L) 07/14/2023   - replaced as indicated  - monitor on bmp

## 2023-07-14 NOTE — PROGRESS NOTES
[facility] [address] [place] [address] Medicine  Progress Note    Patient Name: Brian Friedman  MRN: [id number]  Patient Class: IP- Inpatient   Admission Date: 7/12/2023  Length of Stay: 1 days  Attending Physician: Williams Brown MD  Primary Care Provider: Va Martínez MD        Subjective:     Principal Problem:Orchitis        HPI:  Mr. Friedman is a 73-year-old male with a history of hypertension, complaining of left groin pain, onset yesterday upon waking, sudden, and pain severe and constant since that time. He denies any urinary symptoms, no penile discharge. He has no fevers, chills, nausea, vomiting and constipation. Patient denies any sexual activity in 5 years. His past sexual relationships have all been with women. He reports remote history of STI when he was a teenager but does not remember what the diagnosis was and endorses treatment and resolution of STI. Denies any SOB, chest pain, abdominal distention, pelvic masses, lower extremity swelling.    In ED, Pt AFVSS. Leukocytosis 24.84. K 3.0, . LA 5.4. Hepatitis C Ab reactive. HIV negative. UA w/ 2+ leukocytes, WBC 26. US scrotum and testicles: Left epididymo-orchitis, with a small reactive hydrocele. Initially treated w/ tylenol 1 g, LR 1 L bolus, IV zosyn, K replaced. Urology consulted.      Overview/Hospital Course:  Brian Friedman was admitted to hospital medicine for management of orchitis. Urology consulted, orchitis confirmed but concerned for other underlying infectious processes. Hospital course complicated by patient fevering while on broad spectrum zosyn. CT chest abdomen and pelvis obtained. Antibiotics broadened to include vancomycin.       Interval History:  Febrile to 100.4 F again overnight after broadening antibiotics.   Patient evaluated at bedside. Pain and swelling persist but have gradually improved. CT CAP still pending. No new complaints, all questions answered. Wheezing on exam, home albuterol inhaler ordered      Review of Systems   Constitutional:  Negative for chills and fever.   Respiratory:  Negative for chest tightness and shortness of breath.    Cardiovascular:  Negative for chest pain and leg swelling.   Gastrointestinal:  Negative for abdominal pain and nausea.   Genitourinary:  Positive for scrotal swelling and testicular pain.   Neurological:  Negative for dizziness and weakness.   Objective:     Vital Signs (Most Recent):  Temp: 97.8 °F (36.6 °C) (07/14/23 1145)  Pulse: 69 (07/14/23 1145)  Resp: 18 (07/14/23 1145)  BP: 132/68 (07/14/23 1145)  SpO2: (!) 92 % (07/14/23 1145) Vital Signs (24h Range):  Temp:  [97.6 °F (36.4 °C)-100.4 °F (38 °C)] 97.8 °F (36.6 °C)  Pulse:  [64-81] 69  Resp:  [18] 18  SpO2:  [90 %-95 %] 92 %  BP: (122-146)/(59-68) 132/68     Weight: 86.2 kg (190 lb 0.6 oz)  Body mass index is 28.06 kg/m².    Intake/Output Summary (Last 24 hours) at 7/14/2023 1412  Last data filed at 7/14/2023 0639  Gross per 24 hour   Intake --   Output 1900 ml   Net -1900 ml         Physical Exam  Vitals and nursing note reviewed. Exam conducted with a chaperone present.   Constitutional:       General: He is not in acute distress.     Appearance: He is well-developed. He is ill-appearing.   HENT:      Head: Normocephalic and atraumatic.   Eyes:      Extraocular Movements: Extraocular movements intact.   Cardiovascular:      Rate and Rhythm: Normal rate and regular rhythm.      Heart sounds: Normal heart sounds.   Pulmonary:      Effort: Pulmonary effort is normal. No respiratory distress.      Breath sounds: Wheezing (mild end expiratory wheezes) present.   Abdominal:      General: There is no distension.   Genitourinary:     Testes:         Right: Tenderness or swelling not present.         Left: Tenderness and swelling present.   Musculoskeletal:         General: No tenderness. Normal range of motion.   Skin:     General: Skin is warm and dry.      Findings: No rash.   Neurological:      Mental Status: He is  alert and oriented to person, place, and time.   Psychiatric:         Behavior: Behavior normal.         Thought Content: Thought content normal.         Judgment: Judgment normal.     Significant Labs: All pertinent labs within the past 24 hours have been reviewed.    Significant Imaging: I have reviewed all pertinent imaging results/findings within the past 24 hours.      Assessment/Plan:      * Orchitis  Sepsis  Acute cystitis  This patient does have evidence of infective focus  My overall impression is sepsis.  Source: Urinary Tract  Antibiotics given-   Antibiotics (72h ago, onward)    Start     Stop Route Frequency Ordered    07/14/23 2130  vancomycin 1,250 mg in dextrose 5 % (D5W) 250 mL IVPB (Vial-Mate)         -- IV Every 12 hours (non-standard times) 07/14/23 1103    07/13/23 0848  vancomycin - pharmacy to dose  (vancomycin IVPB (PEDS and ADULTS))        See Hyperspace for full Linked Orders Report.    -- IV pharmacy to manage frequency 07/13/23 0748    07/13/23 0815  piperacillin-tazobactam (ZOSYN) 4.5 g in dextrose 5 % in water (D5W) 5 % 100 mL IVPB (MB+)         -- IV Every 8 hours (non-standard times) 07/13/23 0815        Latest lactate reviewed-  Recent Labs   Lab 07/12/23  1420 07/12/23  1743   LACTATE  --  2.3*   POCLAC 1.51  --      Source control achieved by: IV zosyn    - AFVSS  - Leukocytosis 24 > 19 > 12  - LA 5.4, trending  - HIV negative. STD panel ordered  - US scrotum and testicles: Left epididymo-orchitis, with a small reactive hydrocele.  - s/p tylenol 1 g, continue prn  - LR 1 L bolus  - IV zosyn  - Urology consulted in ED   -scrotal US and physical exam concerning for L orchitis   -recommend Levaquin, scrotal support, NSAIDS, ice packs  - PSA wnl    Metabolic acidosis  - likely due to acute illness  - 16>19>22  - monitor on bmp     Anemia of chronic disease  Patient's anemia is currently controlled. S/p 0 units of PRBCs. Etiology likely d/t chronic disease  Current CBC reviewed-   Lab  Results   Component Value Date    HGB 13.4 (L) 07/14/2023    HCT 39.0 (L) 07/14/2023     Monitor serial CBC and transfuse if patient becomes hemodynamically unstable, symptomatic or H/H drops below 7/21.     Hypokalemia  Lab Results   Component Value Date    K 3.1 (L) 07/14/2023   - replaced as indicated  - monitor on bmp     Vitamin D deficiency  - continue vit D supp daily    Essential hypertension  - amlodipine 10 mg qd  - vitals q4h    Severe sepsis  Lactic acid increased  Leukocytosis   Acute cystitis without hematuria   orchitis   - WBC 24>19 tachypnea and fever 3/4 SIRS   - This patient does have evidence of infective focus  - My overall impression is sepsis.  - Source: Orchitis and UTI  - US showing left epididymo-orchitis, with a small reactive hydrocele.    - fevered on broad spec abx, CT CAP pending   - Latest lactate reviewed-  Recent Labs   Lab 07/12/23  1027 07/12/23  1743   LACTATE 5.4* 2.3*   - on broad spectrum vanc and zosyn   - urine culture with NGTD       VTE Risk Mitigation (From admission, onward)         Ordered     IP VTE LOW RISK PATIENT  Once         07/12/23 1452                Discharge Planning   ERIN: 7/17/2023     Code Status: Full Code   Is the patient medically ready for discharge?: No    Reason for patient still in hospital (select all that apply): Patient trending condition, Laboratory test, Treatment and Imaging  Discharge Plan A: Home                  Janiya Tamayo PA-C  Department of Hospital Medicine   [facility]

## 2023-07-14 NOTE — ASSESSMENT & PLAN NOTE
Lactic acid increased  Leukocytosis   Acute cystitis without hematuria   orchitis   - WBC 24>19 tachypnea and fever 3/4 SIRS   - This patient does have evidence of infective focus  - My overall impression is sepsis.  - Source: Orchitis and UTI  - US showing left epididymo-orchitis, with a small reactive hydrocele.    - fevered on broad spec abx, CT CAP pending   - Latest lactate reviewed-  Recent Labs   Lab 07/12/23  1027 07/12/23  1743   LACTATE 5.4* 2.3*   - on broad spectrum vanc and zosyn   - urine culture with NGTD

## 2023-07-14 NOTE — ASSESSMENT & PLAN NOTE
Sepsis  Acute cystitis  This patient does have evidence of infective focus  My overall impression is sepsis.  Source: Urinary Tract  Antibiotics given-   Antibiotics (72h ago, onward)    Start     Stop Route Frequency Ordered    07/14/23 2130  vancomycin 1,250 mg in dextrose 5 % (D5W) 250 mL IVPB (Vial-Mate)         -- IV Every 12 hours (non-standard times) 07/14/23 1103    07/13/23 0848  vancomycin - pharmacy to dose  (vancomycin IVPB (PEDS and ADULTS))        See Hyperspace for full Linked Orders Report.    -- IV pharmacy to manage frequency 07/13/23 0748    07/13/23 0815  piperacillin-tazobactam (ZOSYN) 4.5 g in dextrose 5 % in water (D5W) 5 % 100 mL IVPB (MB+)         -- IV Every 8 hours (non-standard times) 07/13/23 0815        Latest lactate reviewed-  Recent Labs   Lab 07/12/23  1420 07/12/23  1743   LACTATE  --  2.3*   POCLAC 1.51  --      Source control achieved by: IV zosyn    - AFVSS  - Leukocytosis 24 > 19 > 12  - LA 5.4, trending  - HIV negative. STD panel ordered  - US scrotum and testicles: Left epididymo-orchitis, with a small reactive hydrocele.  - s/p tylenol 1 g, continue prn  - LR 1 L bolus  - IV zosyn  - Urology consulted in ED   -scrotal US and physical exam concerning for L orchitis   -recommend Levaquin, scrotal support, NSAIDS, ice packs  - PSA wnl

## 2023-07-14 NOTE — PROGRESS NOTES
Pharmacokinetic Assessment Follow Up: IV Vancomycin    Vancomycin serum concentration assessment(s):    The trough level was drawn correctly and can be used to guide therapy at this time. The measurement is below the desired definitive target range of 10 to 20 mcg/mL.    Vancomycin Regimen Plan:    Change regimen to Vancomycin 1250 mg IV every 12 hours with next serum trough concentration measured at 0830 prior to 4th dose on 07/16/23    Drug levels (last 3 results):  Recent Labs   Lab Result Units 07/14/23  0917   Vancomycin-Trough ug/mL 7.3*       Pharmacy will continue to follow and monitor vancomycin.    Please contact pharmacy at extension [phone] for questions regarding this assessment.    Thank you for the consult,   Cecilia Bond       Patient brief summary:  Brian Friedman is a 73 y.o. male initiated on antimicrobial therapy with IV Vancomycin for treatment of sepsis        Drug Allergies:   Review of patient's allergies indicates:  No Known Allergies    Actual Body Weight:   86.2 kg    Renal Function:   Estimated Creatinine Clearance: 92.1 mL/min (based on SCr of 0.8 mg/dL).,     Dialysis Method (if applicable):  N/A    CBC (last 72 hours):  Recent Labs   Lab Result Units 07/12/23  1027 07/13/23  0704 07/14/23  0517   WBC K/uL 24.84* 19.34* 12.11   Hemoglobin g/dL 14.5 13.7* 13.4*   Hematocrit % 41.6 38.7* 39.0*   Platelets K/uL 214 184 180   Gran % % 89.0* 83.3* 82.8*   Lymph % % 4.0* 9.4* 10.1*   Mono % % 1.0* 5.4 5.5   Eosinophil % % 0.0 0.3 0.7   Basophil % % 0.0 0.2 0.3   Differential Method  Automated Automated Automated       Metabolic Panel (last 72 hours):  Recent Labs   Lab Result Units 07/12/23  1027 07/12/23  1417 07/13/23  0704 07/14/23  0517   Sodium mmol/L 138  --  138 137   Potassium mmol/L 3.0*  --  3.0* 3.1*   Chloride mmol/L 107  --  106 105   CO2 mmol/L 16*  --  19* 22*   Glucose mg/dL 198*  --  82 97   Glucose, UA   --  Negative  --   --    BUN mg/dL 7*  --  5* 5*   Creatinine  mg/dL 1.0  --  0.8 0.8   Albumin g/dL 3.4*  --  2.7* 2.6*   Total Bilirubin mg/dL 0.7  --  0.7 0.6   Alkaline Phosphatase U/L 96  --  94 107   AST U/L 16  --  12 13   ALT U/L 9*  --  9* 7*   Magnesium mg/dL  --   --  1.7 1.7       Vancomycin Administrations:  vancomycin given in the last 96 hours                     vancomycin (VANCOCIN) 1,000 mg in dextrose 5 % (D5W) 250 mL IVPB (Vial-Mate) (mg) 1,000 mg New Bag 07/14/23 1001     1,000 mg New Bag 07/13/23 2149    vancomycin 1.75 g in 5 % dextrose 500 mL IVPB (mg) 1,750 mg New Bag 07/13/23 0909                    Microbiologic Results:  Microbiology Results (last 7 days)       Procedure Component Value Units Date/Time    Urine culture [[id number]] Collected: 07/12/23 1417    Order Status: Completed Specimen: Urine Updated: 07/13/23 1904     Urine Culture, Routine No growth    Narrative:      Add on CTGC per Janiya Tamayo PA-C/order#[id number] @15:59    07/13/2023.     Specimen Source->Urine    Blood culture #1 **CANNOT BE ORDERED STAT** [[id number]] Collected: 07/12/23 1315    Order Status: Completed Specimen: Blood from Peripheral, Antecubital, Right Updated: 07/13/23 1612     Blood Culture, Routine No Growth to date      No Growth to date    Blood culture #2 **CANNOT BE ORDERED STAT** [[id number]] Collected: 07/12/23 1315    Order Status: Completed Specimen: Blood from Peripheral, Hand, Right Updated: 07/13/23 1612     Blood Culture, Routine No Growth to date      No Growth to date    C. trachomatis/N. gonorrhoeae by AMP DNA [facility]; Urine [[id number]]     Order Status: Completed Specimen: Genital     C. trachomatis/N. gonorrhoeae by AMP DNA [[id number]] Collected: 07/13/23 1417    Order Status: Canceled

## 2023-07-14 NOTE — ASSESSMENT & PLAN NOTE
Patient's anemia is currently controlled. S/p 0 units of PRBCs. Etiology likely d/t chronic disease  Current CBC reviewed-   Lab Results   Component Value Date    HGB 13.4 (L) 07/14/2023    HCT 39.0 (L) 07/14/2023     Monitor serial CBC and transfuse if patient becomes hemodynamically unstable, symptomatic or H/H drops below 7/21.

## 2023-07-14 NOTE — SUBJECTIVE & OBJECTIVE
Interval History:  Febrile to 100.4 F again overnight after broadening antibiotics.   Patient evaluated at bedside. Pain and swelling persist but have gradually improved. CT CAP still pending. No new complaints, all questions answered. Wheezing on exam, home albuterol inhaler ordered     Review of Systems   Constitutional:  Negative for chills and fever.   Respiratory:  Negative for chest tightness and shortness of breath.    Cardiovascular:  Negative for chest pain and leg swelling.   Gastrointestinal:  Negative for abdominal pain and nausea.   Genitourinary:  Positive for scrotal swelling and testicular pain.   Neurological:  Negative for dizziness and weakness.   Objective:     Vital Signs (Most Recent):  Temp: 97.8 °F (36.6 °C) (07/14/23 1145)  Pulse: 69 (07/14/23 1145)  Resp: 18 (07/14/23 1145)  BP: 132/68 (07/14/23 1145)  SpO2: (!) 92 % (07/14/23 1145) Vital Signs (24h Range):  Temp:  [97.6 °F (36.4 °C)-100.4 °F (38 °C)] 97.8 °F (36.6 °C)  Pulse:  [64-81] 69  Resp:  [18] 18  SpO2:  [90 %-95 %] 92 %  BP: (122-146)/(59-68) 132/68     Weight: 86.2 kg (190 lb 0.6 oz)  Body mass index is 28.06 kg/m².    Intake/Output Summary (Last 24 hours) at 7/14/2023 1412  Last data filed at 7/14/2023 0639  Gross per 24 hour   Intake --   Output 1900 ml   Net -1900 ml         Physical Exam  Vitals and nursing note reviewed. Exam conducted with a chaperone present.   Constitutional:       General: He is not in acute distress.     Appearance: He is well-developed. He is ill-appearing.   HENT:      Head: Normocephalic and atraumatic.   Eyes:      Extraocular Movements: Extraocular movements intact.   Cardiovascular:      Rate and Rhythm: Normal rate and regular rhythm.      Heart sounds: Normal heart sounds.   Pulmonary:      Effort: Pulmonary effort is normal. No respiratory distress.      Breath sounds: Wheezing (mild end expiratory wheezes) present.   Abdominal:      General: There is no distension.   Genitourinary:     Testes:          Right: Tenderness or swelling not present.         Left: Tenderness and swelling present.   Musculoskeletal:         General: No tenderness. Normal range of motion.   Skin:     General: Skin is warm and dry.      Findings: No rash.   Neurological:      Mental Status: He is alert and oriented to person, place, and time.   Psychiatric:         Behavior: Behavior normal.         Thought Content: Thought content normal.         Judgment: Judgment normal.     Significant Labs: All pertinent labs within the past 24 hours have been reviewed.    Significant Imaging: I have reviewed all pertinent imaging results/findings within the past 24 hours.

## 2023-07-15 VITALS
TEMPERATURE: 98 F | HEIGHT: 69 IN | BODY MASS INDEX: 28.15 KG/M2 | HEART RATE: 71 BPM | SYSTOLIC BLOOD PRESSURE: 152 MMHG | DIASTOLIC BLOOD PRESSURE: 80 MMHG | OXYGEN SATURATION: 96 % | WEIGHT: 190.06 LBS | RESPIRATION RATE: 18 BRPM

## 2023-07-15 PROBLEM — J43.2 CENTRILOBULAR EMPHYSEMA: Status: ACTIVE | Noted: 2023-07-15

## 2023-07-15 PROBLEM — F17.200 TOBACCO DEPENDENCE: Status: ACTIVE | Noted: 2023-07-15

## 2023-07-15 LAB
ALBUMIN SERPL BCP-MCNC: 2.6 G/DL (ref 3.5–5.2)
ALP SERPL-CCNC: 80 U/L (ref 55–135)
ALT SERPL W/O P-5'-P-CCNC: 9 U/L (ref 10–44)
ANION GAP SERPL CALC-SCNC: 12 MMOL/L (ref 8–16)
AST SERPL-CCNC: 18 U/L (ref 10–40)
BASOPHILS # BLD AUTO: 0.04 K/UL (ref 0–0.2)
BASOPHILS NFR BLD: 0.6 % (ref 0–1.9)
BILIRUB SERPL-MCNC: 0.4 MG/DL (ref 0.1–1)
BUN SERPL-MCNC: 6 MG/DL (ref 8–23)
CALCIUM SERPL-MCNC: 8.6 MG/DL (ref 8.7–10.5)
CHLORIDE SERPL-SCNC: 108 MMOL/L (ref 95–110)
CO2 SERPL-SCNC: 20 MMOL/L (ref 23–29)
CREAT SERPL-MCNC: 0.9 MG/DL (ref 0.5–1.4)
DIFFERENTIAL METHOD: ABNORMAL
EOSINOPHIL # BLD AUTO: 0.1 K/UL (ref 0–0.5)
EOSINOPHIL NFR BLD: 2 % (ref 0–8)
ERYTHROCYTE [DISTWIDTH] IN BLOOD BY AUTOMATED COUNT: 12.7 % (ref 11.5–14.5)
EST. GFR  (NO RACE VARIABLE): >60 ML/MIN/1.73 M^2
GLUCOSE SERPL-MCNC: 112 MG/DL (ref 70–110)
HCT VFR BLD AUTO: 37.4 % (ref 40–54)
HGB BLD-MCNC: 13.1 G/DL (ref 14–18)
IMM GRANULOCYTES # BLD AUTO: 0.03 K/UL (ref 0–0.04)
IMM GRANULOCYTES NFR BLD AUTO: 0.4 % (ref 0–0.5)
LACTATE SERPL-SCNC: 1 MMOL/L (ref 0.5–2.2)
LYMPHOCYTES # BLD AUTO: 1.8 K/UL (ref 1–4.8)
LYMPHOCYTES NFR BLD: 25.9 % (ref 18–48)
MCH RBC QN AUTO: 34.1 PG (ref 27–31)
MCHC RBC AUTO-ENTMCNC: 35 G/DL (ref 32–36)
MCV RBC AUTO: 97 FL (ref 82–98)
MONOCYTES # BLD AUTO: 0.7 K/UL (ref 0.3–1)
MONOCYTES NFR BLD: 9.5 % (ref 4–15)
NEUTROPHILS # BLD AUTO: 4.3 K/UL (ref 1.8–7.7)
NEUTROPHILS NFR BLD: 61.6 % (ref 38–73)
NRBC BLD-RTO: 0 /100 WBC
PLATELET # BLD AUTO: 187 K/UL (ref 150–450)
PMV BLD AUTO: 9.4 FL (ref 9.2–12.9)
POTASSIUM SERPL-SCNC: 3.2 MMOL/L (ref 3.5–5.1)
PROT SERPL-MCNC: 6.2 G/DL (ref 6–8.4)
RBC # BLD AUTO: 3.84 M/UL (ref 4.6–6.2)
SODIUM SERPL-SCNC: 140 MMOL/L (ref 136–145)
WBC # BLD AUTO: 7.03 K/UL (ref 3.9–12.7)

## 2023-07-15 PROCEDURE — 99239 HOSP IP/OBS DSCHRG MGMT >30: CPT | Mod: 25,,,

## 2023-07-15 PROCEDURE — 99406 BEHAV CHNG SMOKING 3-10 MIN: CPT | Mod: ,,,

## 2023-07-15 PROCEDURE — 99406 PR TOBACCO USE CESSATION INTERMEDIATE 3-10 MINUTES: ICD-10-PCS | Mod: ,,,

## 2023-07-15 PROCEDURE — S4991 NICOTINE PATCH NONLEGEND: HCPCS

## 2023-07-15 PROCEDURE — 83605 ASSAY OF LACTIC ACID: CPT

## 2023-07-15 PROCEDURE — 80053 COMPREHEN METABOLIC PANEL: CPT

## 2023-07-15 PROCEDURE — 25000003 PHARM REV CODE 250

## 2023-07-15 PROCEDURE — 36415 COLL VENOUS BLD VENIPUNCTURE: CPT

## 2023-07-15 PROCEDURE — 1111F PR DISCHARGE MEDS RECONCILED W/ CURRENT OUTPATIENT MED LIST: ICD-10-PCS | Mod: CPTII,,,

## 2023-07-15 PROCEDURE — 99239 PR HOSPITAL DISCHARGE DAY,>30 MIN: ICD-10-PCS | Mod: 25,,,

## 2023-07-15 PROCEDURE — 85025 COMPLETE CBC W/AUTO DIFF WBC: CPT

## 2023-07-15 PROCEDURE — 25000003 PHARM REV CODE 250: Performed by: PHYSICIAN ASSISTANT

## 2023-07-15 PROCEDURE — 63600175 PHARM REV CODE 636 W HCPCS

## 2023-07-15 PROCEDURE — 1111F DSCHRG MED/CURRENT MED MERGE: CPT | Mod: CPTII,,,

## 2023-07-15 RX ORDER — IBUPROFEN 200 MG
1 TABLET ORAL DAILY
Qty: 28 PATCH | Refills: 0 | Status: SHIPPED | OUTPATIENT
Start: 2023-07-16 | End: 2023-08-13

## 2023-07-15 RX ORDER — POTASSIUM CHLORIDE 20 MEQ/1
40 TABLET, EXTENDED RELEASE ORAL ONCE
Status: COMPLETED | OUTPATIENT
Start: 2023-07-15 | End: 2023-07-15

## 2023-07-15 RX ORDER — NICOTINE 7MG/24HR
1 PATCH, TRANSDERMAL 24 HOURS TRANSDERMAL DAILY
Qty: 14 PATCH | Refills: 0 | Status: SHIPPED | OUTPATIENT
Start: 2023-08-28 | End: 2023-09-11

## 2023-07-15 RX ORDER — LEVOFLOXACIN 750 MG/1
750 TABLET ORAL DAILY
Qty: 6 TABLET | Refills: 0 | Status: SHIPPED | OUTPATIENT
Start: 2023-07-16 | End: 2023-07-19 | Stop reason: SDUPTHER

## 2023-07-15 RX ORDER — IBUPROFEN 200 MG
1 TABLET ORAL DAILY
Qty: 14 PATCH | Refills: 0 | Status: SHIPPED | OUTPATIENT
Start: 2023-08-13 | End: 2023-08-27

## 2023-07-15 RX ADMIN — PIPERACILLIN SODIUM AND TAZOBACTAM SODIUM 4.5 G: 4; .5 INJECTION, POWDER, LYOPHILIZED, FOR SOLUTION INTRAVENOUS at 09:07

## 2023-07-15 RX ADMIN — PIPERACILLIN SODIUM AND TAZOBACTAM SODIUM 4.5 G: 4; .5 INJECTION, POWDER, LYOPHILIZED, FOR SOLUTION INTRAVENOUS at 01:07

## 2023-07-15 RX ADMIN — NICOTINE 1 PATCH: 14 PATCH, EXTENDED RELEASE TRANSDERMAL at 09:07

## 2023-07-15 RX ADMIN — FOLIC ACID 1000 MCG: 1 TABLET ORAL at 09:07

## 2023-07-15 RX ADMIN — AMLODIPINE BESYLATE 10 MG: 10 TABLET ORAL at 09:07

## 2023-07-15 RX ADMIN — Medication 100 MG: at 09:07

## 2023-07-15 RX ADMIN — MIRTAZAPINE 7.5 MG: 7.5 TABLET, FILM COATED ORAL at 09:07

## 2023-07-15 RX ADMIN — CHOLECALCIFEROL TAB 25 MCG (1000 UNIT) 1000 UNITS: 25 TAB at 09:07

## 2023-07-15 RX ADMIN — POTASSIUM CHLORIDE 40 MEQ: 1500 TABLET, EXTENDED RELEASE ORAL at 09:07

## 2023-07-15 NOTE — ASSESSMENT & PLAN NOTE
Lab Results   Component Value Date    K 3.2 (L) 07/15/2023   - replaced as indicated  - monitor on bmp    Julia Leyva(Attending)

## 2023-07-15 NOTE — ASSESSMENT & PLAN NOTE
- changes noted on inpatient CT CAP   - There are mild centrilobular emphysematous changes.  - continue prn albuterol inhaler for mild symptoms   - referral to outpatient pulmonologist for further management

## 2023-07-15 NOTE — ASSESSMENT & PLAN NOTE
- Current nicotine product: cigarettes  - Current frequency of use: 1 PPD  - The patient's current willingness to quit is prepared.  Patient was counseled that nicotine dependence is a chronic, relapsing disorder that, like other diseases, often requires repeated intervention and long term support. They were counseled on the importance of tobacco cessation and advised of the impact of smoking on both their chronic and acute medical conditions.   Patient was counseled on methods for cessation including combined medication management such as nicotine replacement therapy, buproprion, varenicline and the pros/cons and risks/benefits associated with each of these methods.     The patient expresses  that they ARE READY to quit and are in agreement with the treatment plan of care as detailed below.    Plan:  - Nicotine Replacement Therapy  Nicotine patch, taper as tolerated:  21mg patch/day x 4 weeks, THEN  14mg patch/day x 2 weeks, THEN  7mg patch/day x 2 weeks    Follow Up: Ambulatory Referral to the [facility] (for [place] and [place] residents) [facility] placed.  Additional Resources: This patient was provided with printed information, the Quitline: 7-552-QUIT-NOW, and smokefree.gov text services ([place]IT to 58899)    Smoking and tobacco cessation counseling was provided for 10 minutes including advice on quitting tobacco products, assessment of willingness to quit, methods for cessation, review of medication management of smoking cessation, setting a quit date, provision of resources, and arranging follow up.

## 2023-07-15 NOTE — PROGRESS NOTES
VANCOMYCIN DOSING BY PHARMACY DISCONTINUATION NOTE    Brian Friedman is a 73 y.o. male who had been consulted for vancomycin dosing.    The pharmacy consult for vancomycin dosing has been discontinued.     Vancomycin Dosing by Pharmacy Consult will sign-off. Please reconsult if necessary. Thank you for allowing us to participate in this patient's care.     Jorge Nuñez Pharm.D.  [phone]

## 2023-07-15 NOTE — NURSING
Morning med pass and rounding completed on pt. Pts VSS and is in NAD at this time. No reports of SOB or pain. POC explained to pt and pt expressing no further needs at this time.

## 2023-07-15 NOTE — DISCHARGE SUMMARY
[facility] [address] [place] [address] Medicine  Discharge Summary      Patient Name: Brian Friedman  MRN: [id number]  LYNN: [id number]  Patient Class: IP- Inpatient  Admission Date: 7/12/2023  Hospital Length of Stay: 2 days  Discharge Date and Time: 7/15/2023  6:09 PM  Attending Physician: No att. providers found   Discharging Provider: Janiya Tamayo PA-C  Primary Care Provider: Va Martínez MD  [facility] Medicine Team: [facility] Janiya Tamayo PA-C  Primary Care Team: [facility]    HPI:   Mr. Friedman is a 73-year-old male with a history of hypertension, complaining of left groin pain, onset yesterday upon waking, sudden, and pain severe and constant since that time. He denies any urinary symptoms, no penile discharge. He has no fevers, chills, nausea, vomiting and constipation. Patient denies any sexual activity in 5 years. His past sexual relationships have all been with women. He reports remote history of STI when he was a teenager but does not remember what the diagnosis was and endorses treatment and resolution of STI. Denies any SOB, chest pain, abdominal distention, pelvic masses, lower extremity swelling.    In ED, Pt AFVSS. Leukocytosis 24.84. K 3.0, . LA 5.4. Hepatitis C Ab reactive. HIV negative. UA w/ 2+ leukocytes, WBC 26. US scrotum and testicles: Left epididymo-orchitis, with a small reactive hydrocele. Initially treated w/ tylenol 1 g, LR 1 L bolus, IV zosyn, K replaced. Urology consulted.      * No surgery found *      Hospital Course:   Brian Friedamn was admitted to hospital medicine for management of orchitis. Urology consulted, orchitis confirmed but concerned for other underlying infectious processes. Hospital course complicated by patient fevering while on broad spectrum zosyn. Antibiotics broadened to include vancomycin. CT chest abdomen and pelvis obtained that revealed chronic bladder wall thickening likely from outlet obstruction and mild  centrilobular emphysema. Patient's pain, swelling and fever improved on broad spectrum abx.  Transitioned to PO Levaquin to complete a 10 day course. Given a refill of his albuterol inhaler for mild intermittent COPD symptoms. Referral to outpatient urology and pulmonology placed prior to discharge. Extensive tobacco cessation counseling given, patient wished to received smoking cessation referral and start nicotine patch taper outpatient. All medications delivered to bedside prior to discharge.      On day of discharge patient's vital signs were stable and patient appeared clinically ready for discharge. Hospital course, discharge plan and return precautions discussed - patient expressed understanding. All questions answered at that time.        Goals of Care Treatment Preferences:  Code Status: Full Code      Consults:   Consults (From admission, onward)        Status Ordering Provider     Inpatient consult to Urology  Once        Provider:  (Not yet assigned)    Completed ROSY JAIME          Pulmonary  Centrilobular emphysema  - changes noted on inpatient CT CAP   - There are mild centrilobular emphysematous changes.  - continue prn albuterol inhaler for mild symptoms   - referral to outpatient pulmonologist for further management     Cardiac/Vascular  Essential hypertension  - amlodipine 10 mg qd  - vitals q4h    Renal/  Hypokalemia  Lab Results   Component Value Date    K 3.2 (L) 07/15/2023   - replaced as indicated  - monitor on bmp     Oncology  Anemia of chronic disease  Patient's anemia is currently controlled. S/p 0 units of PRBCs. Etiology likely d/t chronic disease  Current CBC reviewed-   Lab Results   Component Value Date    HGB 13.1 (L) 07/15/2023    HCT 37.4 (L) 07/15/2023     Monitor serial CBC and transfuse if patient becomes hemodynamically unstable, symptomatic or H/H drops below 7/21.     Other  Tobacco dependence  - Current nicotine product: cigarettes  - Current frequency of use: 1  PPD  - The patient's current willingness to quit is prepared.  Patient was counseled that nicotine dependence is a chronic, relapsing disorder that, like other diseases, often requires repeated intervention and long term support. They were counseled on the importance of tobacco cessation and advised of the impact of smoking on both their chronic and acute medical conditions.   Patient was counseled on methods for cessation including combined medication management such as nicotine replacement therapy, buproprion, varenicline and the pros/cons and risks/benefits associated with each of these methods.     The patient expresses  that they ARE READY to quit and are in agreement with the treatment plan of care as detailed below.    Plan:  - Nicotine Replacement Therapy  Nicotine patch, taper as tolerated:  21mg patch/day x 4 weeks, THEN  14mg patch/day x 2 weeks, THEN  7mg patch/day x 2 weeks    Follow Up: Ambulatory Referral to the Critical access hospital (for [place] and [place] residents) [facility]ess[facility] placed.  Additional Resources: This patient was provided with printed information, the Quitline: 9-800-QUIT-NOW, and smokefree.gov text services ([place] QUIT to 92909)    Smoking and tobacco cessation counseling was provided for 10 minutes including advice on quitting tobacco products, assessment of willingness to quit, methods for cessation, review of medication management of smoking cessation, setting a quit date, provision of resources, and arranging follow up.      Final Active Diagnoses:    Diagnosis Date Noted POA    PRINCIPAL PROBLEM:  Orchitis [N45.2] 07/12/2023 Yes    Centrilobular emphysema [J43.2] 07/15/2023 Yes    Tobacco dependence [F17.200] 07/15/2023 Yes    Hypokalemia [E87.6] 07/13/2023 Yes    Anemia of chronic disease [D63.8] 07/13/2023 Yes    Lactic acid increased [E87.20] 07/13/2023 Yes    Leukocytosis [D72.829] 07/13/2023 Yes    Metabolic acidosis [E87.20] 07/13/2023 Yes    Vitamin D deficiency [E55.9]  07/12/2023 Yes    Essential hypertension [I10] 01/14/2019 Yes    Acute cystitis without hematuria [N30.00] 01/14/2019 Yes    Severe sepsis [A41.9, R65.20] 01/13/2019 Yes      Problems Resolved During this Admission:       Discharged Condition: good    Disposition: Home or Self Care    Follow Up:   Follow-up Information     Va Martínez MD Follow up.    Specialty: Geriatric Medicine  Contact information:  [address] [facility] [address]  [phone]             [facility] 4th Fl Follow up.    Specialty: Urology  Contact information:  [address]  [facility] [address] [phone]  Additional information:  Main Building, 4th Floor   Please park in [address] and take Atrium elevator           [facility] .    Specialty: Smoking Cessation  Contact information:  [address]  [facility] [address] [phone]  Additional information:  Main Building, 9th Floor   Please park in South Garage and use Clinic elevator                     Patient Instructions:      Ambulatory referral/consult to Pulmonology   Standing Status: Future   Referral Priority: Urgent Referral Type: Consultation   Referral Reason: Specialty Services Required   Requested Specialty: Pulmonary Disease   Number of Visits Requested: 1     Ambulatory referral/consult to Urology   Standing Status: Future   Referral Priority: Urgent Referral Type: Consultation   Referral Reason: Specialty Services Required   Requested Specialty: Urology   Number of Visits Requested: 1     Ambulatory referral/consult to Internal Medicine   Standing Status: Future   Referral Priority: Urgent Referral Type: Consultation   Referral Reason: Specialty Services Required   Requested Specialty: Internal Medicine   Number of Visits Requested: 1     Ambulatory referral/consult to Smoking Cessation Program   Standing Status: Future   Referral Priority:  Routine Referral Type: Consultation   Referral Reason: Specialty Services Required   Requested Specialty: CTTS   Number of Visits Requested: 1     Diet Cardiac     Notify your health care provider if you experience any of the following:  temperature >100.4     Notify your health care provider if you experience any of the following:  persistent nausea and vomiting or diarrhea     Notify your health care provider if you experience any of the following:  severe uncontrolled pain     Notify your health care provider if you experience any of the following:  difficulty breathing or increased cough     Notify your health care provider if you experience any of the following:  severe persistent headache     Notify your health care provider if you experience any of the following:  worsening rash     Notify your health care provider if you experience any of the following:  persistent dizziness, light-headedness, or visual disturbances     Notify your health care provider if you experience any of the following:  increased confusion or weakness     Reason for not Prescribing Nicotine Replacement     Order Specific Question Answer Comments   Reason for not Prescribing: Not medically appropriate at this time already ordered     Activity as tolerated       Significant Diagnostic Studies:   Lab Results   Component Value Date    WBC 7.03 07/15/2023    HGB 13.1 (L) 07/15/2023    HCT 37.4 (L) 07/15/2023    MCV 97 07/15/2023     07/15/2023       BMP  Lab Results   Component Value Date     07/15/2023    K 3.2 (L) 07/15/2023     07/15/2023    CO2 20 (L) 07/15/2023    BUN 6 (L) 07/15/2023    CREATININE 0.9 07/15/2023    CALCIUM 8.6 (L) 07/15/2023    ANIONGAP 12 07/15/2023    EGFRNORACEVR >60.0 07/15/2023     Results for orders placed or performed during the hospital encounter of 07/12/23 (from the past 2160 hour(s))   CT Chest Abdomen Pelvis With Contrast (xpd)    Narrative    EXAMINATION:  CT CHEST ABDOMEN PELVIS WITH  CONTRAST (XPD)    CLINICAL HISTORY:  orchitis, fevering on vanc and zosyn. rule out other intraabdominal infectious etiologies;    TECHNIQUE:  Low dose axial images were obtained from the thoracic inlet to the pubic symphysis following the administration of 100 mL of Omnipaque 350 intravenous contrast.  Sagittal and coronal reformats were provided.    COMPARISON:  CT of the chest from 12/28/2022.  CT of the abdomen and pelvis from 01/15/2019.    FINDINGS:  Lungs: There are bandlike opacities in the bilateral lower lobes, likely subsegmental atelectasis or scarring.  The lungs are otherwise clear without large focal consolidation or ground-glass opacity.  There are mild centrilobular emphysematous changes.    Airways: The large airways are clear.    Pleura: No fluid or thickening.  No pneumothorax.    Lymph nodes: No evidence of mediastinal, hilar, or axillary lymphadenopathy.    Esophagus: Normal.    Heart: Heart size is normal.  No pericardial effusion.      Chest wall: Normal.    Liver: There are several too small to definitively characterize hypodensities in the right and left hepatic lobes, stable from prior.  The liver is enlarged.    Biliary tract: No intra or extrahepatic biliary ductal dilatation.    Gallbladder: There are numerous radiodense gallstones.  The gallbladder is contracted.  There is no gallbladder wall thickening or pericholecystic fluid.    Pancreas: Normal. No pancreatic ductal dilation.    Spleen: Normal in size without focal lesion.    Adrenals: Normal.    Kidneys and urinary collecting systems: There are several too small to characterize hypodensities in the bilateral kidneys, stable.  No hydronephrosis or urolithiasis.    Stomach and bowel: No bowel obstruction or abnormal bowel wall thickening.  The appendix is normal.    Peritoneum and mesentery: No ascites or free intraperitoneal air.  No abdominal fluid collection.  No evidence of mesenteric or retroperitoneal  lymphadenopathy.    Vasculature: There is moderate atherosclerosis.  No aneurysm.  There are calcifications of the coronary arteries.    Urinary bladder: There is urinary bladder wall thickening, stable.    Reproductive organs: The prostate is enlarged.    Body wall: No abnormality.    Musculoskeletal: There is no acute fracture or dislocation or aggressive osseous lesion.  There is levoconvex scoliosis of the thoracic spine.      Impression    1. No acute abnormality of the chest, abdomen, or pelvis.  2. Urinary bladder wall thickening, similar to prior and likely due to chronic bladder outlet obstruction.  Correlate with urinalysis to exclude cystitis.  3. Prostatomegaly.  4. Cholelithiasis.      Electronically signed by: Miguel Mckenzie  Date:    07/14/2023  Time:    23:01       Imaging Results          US Pelvis Limited Non OB (Final result)  Result time 07/12/23 18:56:44   Procedure changed from US Transrectal     Final result by Jabier Dow MD (07/12/23 18:56:44)                 Impression:      As above.    Electronically signed by resident: Slava Trotter  Date:    07/12/2023  Time:    18:45    Electronically signed by: Jabier Dow MD  Date:    07/12/2023  Time:    18:56             Narrative:    EXAMINATION:  US PELVIS LIMITED NON OB    CLINICAL HISTORY:  c/f prostatitis;.    TECHNIQUE:  Limited, transabdominal grayscale and Doppler ultrasound of the pelvis.    COMPARISON:  Ultrasound 07/12/2023    FINDINGS:  Prostate is not enlarged measuring 4.5 x 3.9 x 3.9 cm and contains multiple dystrophic calcifications.  No prostatic hypervascularity.  No abscess.  Visualized bladder is unremarkable.                                US Scrotum And Testicles (Final result)  Result time 07/12/23 12:18:43    Final result by Isra Stacy MD (07/12/23 12:18:43)                 Impression:      Left epididymo-orchitis, with a small reactive hydrocele.    Other findings as described.    This report was flagged in Epic  as abnormal.      Electronically signed by: Isra Stacy  Date:    07/12/2023  Time:    12:18             Narrative:    EXAMINATION:  US SCROTUM AND TESTICLES    CLINICAL HISTORY:  Scrotal pain    TECHNIQUE:  Sonography of the scrotum and testes.    COMPARISON:  Scrotal ultrasound 1/15/2019    FINDINGS:  Right Testicle:    *Size: 4.6 x 3.0 x 2.4 cm cm  *Appearance: Normal.  *Flow: Normal arterial and venous flow  *Epididymis: 4 mm cystic lesion in the epididymal head, likely a cyst or spermatocele.  *Hydrocele: None.  Unchanged 5 mm scrotal alejandro.  *Varicocele: Present.  Left Testicle:    *Size: 4.7 x 3.4 x 3.2 cm cm  *Appearance: No focal lesion.  *Flow: Arterial and venous flow is detected.  There is increased vascularity in the testicle and epididymis.  *Epididymis: Engorged.  Small cystic lesions in the epididymal head and body measuring up to 4 mm, likely cysts or spermatoceles.  *Hydrocele: Small.  *Varicocele: Present.  Other findings: Thickening of the left hemiscrotum, measuring 5 mm compared to 3 mm on the right.                              Microbiology Results (last 7 days)     Procedure Component Value Units Date/Time    Blood culture #1 **CANNOT BE ORDERED STAT** [[id number]] Collected: 07/12/23 1315    Order Status: Completed Specimen: Blood from Peripheral, Antecubital, Right Updated: 07/15/23 1612     Blood Culture, Routine No Growth to date      No Growth to date      No Growth to date      No Growth to date    Blood culture #2 **CANNOT BE ORDERED STAT** [[id number]] Collected: 07/12/23 1315    Order Status: Completed Specimen: Blood from Peripheral, Hand, Right Updated: 07/15/23 1612     Blood Culture, Routine No Growth to date      No Growth to date      No Growth to date      No Growth to date    Urine culture [[id number]] Collected: 07/12/23 1417    Order Status: Completed Specimen: Urine Updated: 07/13/23 1904     Urine Culture, Routine No growth    Narrative:      Add on CTGC per Janiya  RUSTY Tamayo/order#[id number] @15:59    07/13/2023.     Specimen Source->Urine    C. trachomatis/N. gonorrhoeae by AMP DNA [facility]; Urine [[id number]]     Order Status: Completed Specimen: Genital     C. trachomatis/N. gonorrhoeae by AMP DNA [[id number]] Collected: 07/13/23 1417    Order Status: Canceled           Pending Diagnostic Studies:     None         Medications:  Reconciled Home Medications:      Medication List      START taking these medications    levoFLOXacin 750 MG tablet  Commonly known as: LEVAQUIN  Take 1 tablet (750 mg total) by mouth once daily. for 6 days  Start taking on: July 16, 2023     * nicotine 21 mg/24 hr  Commonly known as: NICODERM CQ  Place 1 patch onto the skin once daily.  Start taking on: July 16, 2023     * nicotine 14 mg/24 hr  Commonly known as: NICODERM CQ  Place 1 patch onto the skin once daily. for 14 days  Start taking on: August 13, 2023     * nicotine 7 mg/24 hr  Commonly known as: NICODERM CQ  Place 1 patch onto the skin once daily. for 14 days  Start taking on: August 28, 2023         * This list has 3 medication(s) that are the same as other medications prescribed for you. Read the directions carefully, and ask your doctor or other care provider to review them with you.            CONTINUE taking these medications    albuterol 90 mcg/actuation inhaler  Commonly known as: PROVENTIL/VENTOLIN HFA  Inhale 2 puffs into the lungs every 4 (four) hours as needed for Wheezing or Shortness of Breath.     amLODIPine 10 MG tablet  Commonly known as: NORVASC  Take 10 mg by mouth once daily.     fluticasone propionate 50 mcg/actuation nasal spray  Commonly known as: FLONASE  1 spray by Each Nostril route daily as needed for Allergies.     folic acid 1 MG tablet  Commonly known as: FOLVITE  Take 1,000 mcg by mouth once daily.     mirtazapine 7.5 MG Tab  Commonly known as: REMERON  Take 7.5 mg by mouth once daily.     thiamine 100 MG tablet  Take 100 mg by mouth once daily.     vitamin  D 1000 units Tab  Commonly known as: VITAMIN D3  Take 1,000 Units by mouth.        STOP taking these medications    GOODY'S BODY PAIN ORAL            Indwelling Lines/Drains at time of discharge:   Lines/Drains/Airways     None                 Time spent on the discharge of patient: 36 minutes         Janiya Tamayo PA-C  Department of Hospital Medicine  [facility]

## 2023-07-15 NOTE — ASSESSMENT & PLAN NOTE
- amlodipine 10 mg qd  - vitals q4h   Calcipotriene Counseling: Cantharidin Counseling:  I discussed with the patient the risks of Cantharidin including but not limited to pain, redness, burning, itching, and blistering.

## 2023-07-15 NOTE — ASSESSMENT & PLAN NOTE
Patient's anemia is currently controlled. S/p 0 units of PRBCs. Etiology likely d/t chronic disease  Current CBC reviewed-   Lab Results   Component Value Date    HGB 13.1 (L) 07/15/2023    HCT 37.4 (L) 07/15/2023     Monitor serial CBC and transfuse if patient becomes hemodynamically unstable, symptomatic or H/H drops below 7/21.

## 2023-07-15 NOTE — DISCHARGE INSTRUCTIONS
Smoking cessation:  - a referral to the smoking cessation program has been sent  - nicotine patch therapy has been prescribed; use the patches as described below:    - Nicotine Replacement Therapy  Nicotine patch, taper as tolerated:  21mg patch/day x 4 weeks, THEN  14mg patch/day x 2 weeks, THEN  7mg patch/day x 2 weeks

## 2023-07-17 LAB
BACTERIA BLD CULT: NORMAL
BACTERIA BLD CULT: NORMAL

## 2023-07-19 ENCOUNTER — OFFICE VISIT (OUTPATIENT)
Dept: UROLOGY | Facility: CLINIC | Age: 73
End: 2023-07-19
Payer: MEDICARE

## 2023-07-19 VITALS
HEIGHT: 69 IN | WEIGHT: 184 LBS | SYSTOLIC BLOOD PRESSURE: 105 MMHG | HEART RATE: 77 BPM | BODY MASS INDEX: 27.25 KG/M2 | DIASTOLIC BLOOD PRESSURE: 68 MMHG

## 2023-07-19 DIAGNOSIS — N45.3 EPIDIDYMO-ORCHITIS: Primary | ICD-10-CM

## 2023-07-19 DIAGNOSIS — N45.2 ORCHITIS: ICD-10-CM

## 2023-07-19 LAB
BILIRUB SERPL-MCNC: NORMAL MG/DL
BLOOD URINE, POC: NORMAL
CLARITY, POC UA: NORMAL
COLOR, POC UA: YELLOW
GLUCOSE UR QL STRIP: NORMAL
KETONES UR QL STRIP: NORMAL
LEUKOCYTE ESTERASE URINE, POC: NORMAL
NITRITE, POC UA: NORMAL
PH, POC UA: 5
PROTEIN, POC: NORMAL
SPECIFIC GRAVITY, POC UA: 1.02
UROBILINOGEN, POC UA: NORMAL

## 2023-07-19 PROCEDURE — 99215 PR OFFICE/OUTPT VISIT, EST, LEVL V, 40-54 MIN: ICD-10-PCS | Mod: S$GLB,,,

## 2023-07-19 PROCEDURE — 1101F PR PT FALLS ASSESS DOC 0-1 FALLS W/OUT INJ PAST YR: ICD-10-PCS | Mod: CPTII,S$GLB,,

## 2023-07-19 PROCEDURE — 3078F PR MOST RECENT DIASTOLIC BLOOD PRESSURE < 80 MM HG: ICD-10-PCS | Mod: CPTII,S$GLB,,

## 2023-07-19 PROCEDURE — 1160F PR REVIEW ALL MEDS BY PRESCRIBER/CLIN PHARMACIST DOCUMENTED: ICD-10-PCS | Mod: CPTII,S$GLB,,

## 2023-07-19 PROCEDURE — 3074F SYST BP LT 130 MM HG: CPT | Mod: CPTII,S$GLB,,

## 2023-07-19 PROCEDURE — 1159F MED LIST DOCD IN RCRD: CPT | Mod: CPTII,S$GLB,,

## 2023-07-19 PROCEDURE — 99999 PR PBB SHADOW E&M-EST. PATIENT-LVL IV: ICD-10-PCS | Mod: PBBFAC,,,

## 2023-07-19 PROCEDURE — 1159F PR MEDICATION LIST DOCUMENTED IN MEDICAL RECORD: ICD-10-PCS | Mod: CPTII,S$GLB,,

## 2023-07-19 PROCEDURE — 81002 POCT URINE DIPSTICK WITHOUT MICROSCOPE: ICD-10-PCS | Mod: S$GLB,,,

## 2023-07-19 PROCEDURE — 3074F PR MOST RECENT SYSTOLIC BLOOD PRESSURE < 130 MM HG: ICD-10-PCS | Mod: CPTII,S$GLB,,

## 2023-07-19 PROCEDURE — 81002 URINALYSIS NONAUTO W/O SCOPE: CPT | Mod: S$GLB,,,

## 2023-07-19 PROCEDURE — 3288F PR FALLS RISK ASSESSMENT DOCUMENTED: ICD-10-PCS | Mod: CPTII,S$GLB,,

## 2023-07-19 PROCEDURE — 99999 PR PBB SHADOW E&M-EST. PATIENT-LVL IV: CPT | Mod: PBBFAC,,,

## 2023-07-19 PROCEDURE — 87086 URINE CULTURE/COLONY COUNT: CPT

## 2023-07-19 PROCEDURE — 1126F AMNT PAIN NOTED NONE PRSNT: CPT | Mod: CPTII,S$GLB,,

## 2023-07-19 PROCEDURE — 1126F PR PAIN SEVERITY QUANTIFIED, NO PAIN PRESENT: ICD-10-PCS | Mod: CPTII,S$GLB,,

## 2023-07-19 PROCEDURE — 99215 OFFICE O/P EST HI 40 MIN: CPT | Mod: S$GLB,,,

## 2023-07-19 PROCEDURE — 1111F PR DISCHARGE MEDS RECONCILED W/ CURRENT OUTPATIENT MED LIST: ICD-10-PCS | Mod: CPTII,S$GLB,,

## 2023-07-19 PROCEDURE — 3008F BODY MASS INDEX DOCD: CPT | Mod: CPTII,S$GLB,,

## 2023-07-19 PROCEDURE — 1160F RVW MEDS BY RX/DR IN RCRD: CPT | Mod: CPTII,S$GLB,,

## 2023-07-19 PROCEDURE — 3288F FALL RISK ASSESSMENT DOCD: CPT | Mod: CPTII,S$GLB,,

## 2023-07-19 PROCEDURE — 3078F DIAST BP <80 MM HG: CPT | Mod: CPTII,S$GLB,,

## 2023-07-19 PROCEDURE — 1101F PT FALLS ASSESS-DOCD LE1/YR: CPT | Mod: CPTII,S$GLB,,

## 2023-07-19 PROCEDURE — 1111F DSCHRG MED/CURRENT MED MERGE: CPT | Mod: CPTII,S$GLB,,

## 2023-07-19 PROCEDURE — 3008F PR BODY MASS INDEX (BMI) DOCUMENTED: ICD-10-PCS | Mod: CPTII,S$GLB,,

## 2023-07-19 RX ORDER — DICLOFENAC SODIUM 75 MG/1
75 TABLET, DELAYED RELEASE ORAL 2 TIMES DAILY
COMMUNITY
Start: 2023-07-19

## 2023-07-19 RX ORDER — LEVOFLOXACIN 750 MG/1
750 TABLET ORAL DAILY
Qty: 6 TABLET | Refills: 0 | Status: SHIPPED | OUTPATIENT
Start: 2023-07-19 | End: 2023-07-25

## 2023-07-19 NOTE — PATIENT INSTRUCTIONS
Good scrotal support - wear jock strap over underwear   Use ice for pain as needed - barrier between skin and ice pack, 15 min on, 45 min off intermittently  NSAIDS for pain  - risk of gastric ulcers with nsaids and instructed patient to take with food.   OTC Voltaren gel for pain relief   Use rolled dishtowel to elevate scrotum x1 hour at night to help decrease pain and swelling  No heavy lifting over 10 lbs for 2 weeks

## 2023-07-19 NOTE — PROGRESS NOTES
CHIEF COMPLAINT:  orchitis      HISTORY OF PRESENTING ILLINESS:  Brian Friedman is a 73 y.o. male new to urology. He presents today due to epididymo-orchitis. He presented to the ED 7/12/23 due to left groin pain, tx with zosyn and vancomycin inpatient, dc'd home on 10 day course of levofloxacin. Pt states he went to PCP earlier today and received an antibiotic injection in the office. Patient reports that he is not been sexually active in 3 years. Denies any abdominal distention. Denies trauma or hx of UTI or STD.     No baseline urinary issues, no issues today. No family hx of bladder, kidney or prostate cancer.    Scrotal US - left epididymitis orchitis, left hydrocele, flow both testicles  Cr 1.0, WBC 24.8, Lactic acid 5.4 7/12/23, 1.0 7/15/23       REVIEW OF SYSTEMS:  Review of Systems   Constitutional:  Negative for chills and fever.   HENT:  Negative for congestion and sore throat.    Respiratory:  Negative for cough and shortness of breath.    Cardiovascular:  Negative for chest pain and palpitations.   Gastrointestinal:  Negative for nausea and vomiting.   Genitourinary:  Negative for dysuria, flank pain, frequency, hematuria and urgency.        L testicle discomfort    Neurological:  Negative for dizziness and headaches.       PATIENT HISTORY:    Past Medical History:   Diagnosis Date    Essential hypertension 1/14/2019       Past Surgical History:   Procedure Laterality Date    COLONOSCOPY N/A 6/15/2022    Procedure: COLONOSCOPY;  Surgeon: Serg Turner MD;  Location: [address];  Service: Endoscopy;  Laterality: N/A;    SKIN GRAFT         No family history on file.    Social History     Socioeconomic History    Marital status: Single   Tobacco Use    Smoking status: Every Day     Packs/day: 0.50     Types: Cigarettes    Smokeless tobacco: Never   Substance and Sexual Activity    Alcohol use: Yes     Alcohol/week: 10.0 standard drinks     Types: 10 Cans of beer per week     Comment: daily  Gin  and cranberry juice    Drug use: No    Sexual activity: Not Currently       Allergies:  Patient has no known allergies.    Medications:    Current Outpatient Medications:     albuterol (PROVENTIL/VENTOLIN HFA) 90 mcg/actuation inhaler, Inhale 2 puffs into the lungs every 4 (four) hours as needed for Wheezing or Shortness of Breath., Disp: , Rfl:     amLODIPine (NORVASC) 10 MG tablet, Take 10 mg by mouth once daily., Disp: , Rfl:     diclofenac (VOLTAREN) 75 MG EC tablet, Take 75 mg by mouth 2 (two) times daily., Disp: , Rfl:     fluticasone propionate (FLONASE) 50 mcg/actuation nasal spray, 1 spray by Each Nostril route daily as needed for Allergies., Disp: , Rfl:     folic acid (FOLVITE) 1 MG tablet, Take 1,000 mcg by mouth once daily., Disp: , Rfl:     mirtazapine (REMERON) 7.5 MG Tab, Take 7.5 mg by mouth once daily., Disp: , Rfl:     [START ON 8/13/2023] nicotine (NICODERM CQ) 14 mg/24 hr, Place 1 patch onto the skin once daily. for 14 days, Disp: 14 patch, Rfl: 0    nicotine (NICODERM CQ) 21 mg/24 hr, Place 1 patch onto the skin once daily., Disp: 28 patch, Rfl: 0    [START ON 8/28/2023] nicotine (NICODERM CQ) 7 mg/24 hr, Place 1 patch onto the skin once daily. for 14 days, Disp: 14 patch, Rfl: 0    thiamine 100 MG tablet, Take 100 mg by mouth once daily., Disp: , Rfl:     vitamin D (VITAMIN D3) 1000 units Tab, Take 1,000 Units by mouth., Disp: , Rfl:     levoFLOXacin (LEVAQUIN) 750 MG tablet, Take 1 tablet (750 mg total) by mouth once daily. for 6 days, Disp: 6 tablet, Rfl: 0    PHYSICAL EXAMINATION:  Physical Exam  Constitutional:       Appearance: Normal appearance.   HENT:      Head: Normocephalic and atraumatic.      Right Ear: External ear normal.      Left Ear: External ear normal.   Pulmonary:      Effort: Pulmonary effort is normal. No respiratory distress.   Genitourinary:     Penis: Normal and uncircumcised.       Testes:         Left: Tenderness and swelling present.      Epididymis:      Left:  Inflamed. Tenderness present.   Skin:     General: Skin is warm and dry.   Neurological:      General: No focal deficit present.      Mental Status: He is alert and oriented to person, place, and time.   Psychiatric:         Mood and Affect: Mood normal.         Behavior: Behavior normal.         LABS:  + leuk       Lab Results   Component Value Date    PSA 0.56 07/13/2023    PSA 0.3 04/09/2008       Lab Results   Component Value Date    CREATININE 0.9 07/15/2023    EGFRNORACEVR >60.0 07/15/2023             IMPRESSION:  Encounter Diagnoses   Name Primary?    Epididymo-orchitis Yes    Orchitis          Assessment:       1. Epididymo-orchitis    2. Orchitis        Plan:   - Testicle pain precations discussed, recs made, printed information provided.  - Extended lavaquin course, additional 6 days provided, BUN/Cr WNL  - Urine sent for culture     Follow up in 2 weeks for re-evaluation of symptoms     I spent 45 minutes with the patient of which more than half was spent in direct consultation with the patient in regards to our treatment and plan.  We addressed the office findings and recent labs.

## 2023-07-20 LAB — BACTERIA UR CULT: NO GROWTH

## 2023-07-21 ENCOUNTER — TELEPHONE (OUTPATIENT)
Dept: UROLOGY | Facility: CLINIC | Age: 73
End: 2023-07-21
Payer: MEDICARE

## 2023-08-02 ENCOUNTER — OFFICE VISIT (OUTPATIENT)
Dept: UROLOGY | Facility: CLINIC | Age: 73
End: 2023-08-02
Payer: MEDICARE

## 2023-08-02 VITALS
WEIGHT: 188 LBS | DIASTOLIC BLOOD PRESSURE: 76 MMHG | BODY MASS INDEX: 27.85 KG/M2 | SYSTOLIC BLOOD PRESSURE: 136 MMHG | HEIGHT: 69 IN | HEART RATE: 82 BPM

## 2023-08-02 DIAGNOSIS — N45.3 EPIDIDYMO-ORCHITIS: Primary | ICD-10-CM

## 2023-08-02 LAB
BILIRUB SERPL-MCNC: NORMAL MG/DL
BLOOD URINE, POC: NORMAL
CLARITY, POC UA: CLEAR
COLOR, POC UA: YELLOW
GLUCOSE UR QL STRIP: NORMAL
KETONES UR QL STRIP: NORMAL
LEUKOCYTE ESTERASE URINE, POC: NORMAL
NITRITE, POC UA: NORMAL
PH, POC UA: 6
PROTEIN, POC: NORMAL
SPECIFIC GRAVITY, POC UA: 1.01
UROBILINOGEN, POC UA: NORMAL

## 2023-08-02 PROCEDURE — 3008F BODY MASS INDEX DOCD: CPT | Mod: CPTII,S$GLB,,

## 2023-08-02 PROCEDURE — 1126F PR PAIN SEVERITY QUANTIFIED, NO PAIN PRESENT: ICD-10-PCS | Mod: CPTII,S$GLB,,

## 2023-08-02 PROCEDURE — 81002 URINALYSIS NONAUTO W/O SCOPE: CPT | Mod: S$GLB,,,

## 2023-08-02 PROCEDURE — 1160F PR REVIEW ALL MEDS BY PRESCRIBER/CLIN PHARMACIST DOCUMENTED: ICD-10-PCS | Mod: CPTII,S$GLB,,

## 2023-08-02 PROCEDURE — 1111F DSCHRG MED/CURRENT MED MERGE: CPT | Mod: CPTII,S$GLB,,

## 2023-08-02 PROCEDURE — 3075F SYST BP GE 130 - 139MM HG: CPT | Mod: CPTII,S$GLB,,

## 2023-08-02 PROCEDURE — 1126F AMNT PAIN NOTED NONE PRSNT: CPT | Mod: CPTII,S$GLB,,

## 2023-08-02 PROCEDURE — 1159F PR MEDICATION LIST DOCUMENTED IN MEDICAL RECORD: ICD-10-PCS | Mod: CPTII,S$GLB,,

## 2023-08-02 PROCEDURE — 99999 PR PBB SHADOW E&M-EST. PATIENT-LVL III: ICD-10-PCS | Mod: PBBFAC,,,

## 2023-08-02 PROCEDURE — 1101F PT FALLS ASSESS-DOCD LE1/YR: CPT | Mod: CPTII,S$GLB,,

## 2023-08-02 PROCEDURE — 3075F PR MOST RECENT SYSTOLIC BLOOD PRESS GE 130-139MM HG: ICD-10-PCS | Mod: CPTII,S$GLB,,

## 2023-08-02 PROCEDURE — 1111F PR DISCHARGE MEDS RECONCILED W/ CURRENT OUTPATIENT MED LIST: ICD-10-PCS | Mod: CPTII,S$GLB,,

## 2023-08-02 PROCEDURE — 99214 OFFICE O/P EST MOD 30 MIN: CPT | Mod: S$GLB,,,

## 2023-08-02 PROCEDURE — 1159F MED LIST DOCD IN RCRD: CPT | Mod: CPTII,S$GLB,,

## 2023-08-02 PROCEDURE — 3008F PR BODY MASS INDEX (BMI) DOCUMENTED: ICD-10-PCS | Mod: CPTII,S$GLB,,

## 2023-08-02 PROCEDURE — 3078F PR MOST RECENT DIASTOLIC BLOOD PRESSURE < 80 MM HG: ICD-10-PCS | Mod: CPTII,S$GLB,,

## 2023-08-02 PROCEDURE — 1101F PR PT FALLS ASSESS DOC 0-1 FALLS W/OUT INJ PAST YR: ICD-10-PCS | Mod: CPTII,S$GLB,,

## 2023-08-02 PROCEDURE — 3288F FALL RISK ASSESSMENT DOCD: CPT | Mod: CPTII,S$GLB,,

## 2023-08-02 PROCEDURE — 99999 PR PBB SHADOW E&M-EST. PATIENT-LVL III: CPT | Mod: PBBFAC,,,

## 2023-08-02 PROCEDURE — 81002 POCT URINE DIPSTICK WITHOUT MICROSCOPE: ICD-10-PCS | Mod: S$GLB,,,

## 2023-08-02 PROCEDURE — 3078F DIAST BP <80 MM HG: CPT | Mod: CPTII,S$GLB,,

## 2023-08-02 PROCEDURE — 1160F RVW MEDS BY RX/DR IN RCRD: CPT | Mod: CPTII,S$GLB,,

## 2023-08-02 PROCEDURE — 3288F PR FALLS RISK ASSESSMENT DOCUMENTED: ICD-10-PCS | Mod: CPTII,S$GLB,,

## 2023-08-02 PROCEDURE — 99214 PR OFFICE/OUTPT VISIT, EST, LEVL IV, 30-39 MIN: ICD-10-PCS | Mod: S$GLB,,,

## 2023-08-02 NOTE — PROGRESS NOTES
CHIEF COMPLAINT:  Follow up orchitis       HISTORY OF PRESENTING ILLINESS:  Brian Friedman is a 73 y.o. male new to urology. He presents today due to epididymo-orchitis follow up. He presented to the ED 7/12/23 due to left groin pain, tx with zosyn and vancomycin inpatient, dc'd home on 10 day course of levofloxacin. Pt states he went to PCP 7/19/23 and received an antibiotic injection in the office. Patient reports that he is not been sexually active in 3 years. Denies any abdominal distention. Denies trauma or hx of UTI or STD.      No baseline urinary issues, no issues today. No family hx of bladder, kidney or prostate cancer.     Scrotal US - left epididymitis orchitis, left hydrocele, flow both testicles  Cr 1.0, WBC 24.8, Lactic acid 5.4 7/12/23, 1.0 7/15/23      REVIEW OF SYSTEMS:  Review of Systems   Constitutional:  Negative for chills and fever.   HENT:  Negative for congestion and sore throat.    Respiratory:  Negative for cough and shortness of breath.    Cardiovascular:  Negative for chest pain and palpitations.   Gastrointestinal:  Negative for nausea and vomiting.   Genitourinary:  Negative for dysuria, flank pain, frequency, hematuria and urgency.   Neurological:  Negative for dizziness and headaches.         PATIENT HISTORY:    Past Medical History:   Diagnosis Date    Essential hypertension 1/14/2019       Past Surgical History:   Procedure Laterality Date    COLONOSCOPY N/A 6/15/2022    Procedure: COLONOSCOPY;  Surgeon: Serg Turner MD;  Location: [address];  Service: Endoscopy;  Laterality: N/A;    SKIN GRAFT         No family history on file.    Social History     Socioeconomic History    Marital status: Single   Tobacco Use    Smoking status: Every Day     Current packs/day: 0.50     Types: Cigarettes    Smokeless tobacco: Never   Substance and Sexual Activity    Alcohol use: Yes     Alcohol/week: 10.0 standard drinks of alcohol     Types: 10 Cans of beer per week     Comment: daily   Gin and cranberry juice    Drug use: No    Sexual activity: Not Currently       Allergies:  Patient has no known allergies.    Medications:    Current Outpatient Medications:     albuterol (PROVENTIL/VENTOLIN HFA) 90 mcg/actuation inhaler, Inhale 2 puffs into the lungs every 4 (four) hours as needed for Wheezing or Shortness of Breath., Disp: , Rfl:     amLODIPine (NORVASC) 10 MG tablet, Take 10 mg by mouth once daily., Disp: , Rfl:     diclofenac (VOLTAREN) 75 MG EC tablet, Take 75 mg by mouth 2 (two) times daily., Disp: , Rfl:     fluticasone propionate (FLONASE) 50 mcg/actuation nasal spray, 1 spray by Each Nostril route daily as needed for Allergies., Disp: , Rfl:     folic acid (FOLVITE) 1 MG tablet, Take 1,000 mcg by mouth once daily., Disp: , Rfl:     mirtazapine (REMERON) 7.5 MG Tab, Take 7.5 mg by mouth once daily., Disp: , Rfl:     [START ON 8/13/2023] nicotine (NICODERM CQ) 14 mg/24 hr, Place 1 patch onto the skin once daily. for 14 days, Disp: 14 patch, Rfl: 0    nicotine (NICODERM CQ) 21 mg/24 hr, Place 1 patch onto the skin once daily., Disp: 28 patch, Rfl: 0    [START ON 8/28/2023] nicotine (NICODERM CQ) 7 mg/24 hr, Place 1 patch onto the skin once daily. for 14 days, Disp: 14 patch, Rfl: 0    thiamine 100 MG tablet, Take 100 mg by mouth once daily., Disp: , Rfl:     vitamin D (VITAMIN D3) 1000 units Tab, Take 1,000 Units by mouth., Disp: , Rfl:     PHYSICAL EXAMINATION:  Physical Exam  Constitutional:       Appearance: Normal appearance.   HENT:      Head: Normocephalic and atraumatic.      Right Ear: External ear normal.      Left Ear: External ear normal.   Pulmonary:      Effort: Pulmonary effort is normal. No respiratory distress.   Genitourinary:     Penis: Normal.       Testes: Normal.   Skin:     General: Skin is warm and dry.   Neurological:      General: No focal deficit present.      Mental Status: He is alert and oriented to person, place, and time.   Psychiatric:         Mood and Affect:  Mood normal.         Behavior: Behavior normal.           LABS:  UA WNL      Lab Results   Component Value Date    PSA 0.56 07/13/2023    PSA 0.3 04/09/2008       Lab Results   Component Value Date    CREATININE 0.9 07/15/2023    EGFRNORACEVR >60.0 07/15/2023           IMPRESSION:  Encounter Diagnoses   Name Primary?    Epididymo-orchitis Yes         Assessment:       1. Epididymo-orchitis        Plan:   - S/s of epididymo-orchitis now resolved.    RTC PRN for any changes in urinary status    I spent 30 minutes with the patient of which more than half was spent in direct consultation with the patient in regards to our treatment and plan.  We addressed the office findings and recent labs.   Education and recommendations of today's plan of care including home remedies and needed follow up with PCP.

## 2023-10-16 PROBLEM — A41.9 SEVERE SEPSIS: Status: RESOLVED | Noted: 2019-01-13 | Resolved: 2023-10-16

## 2023-10-16 PROBLEM — R65.20 SEVERE SEPSIS: Status: RESOLVED | Noted: 2019-01-13 | Resolved: 2023-10-16

## 2024-05-02 DIAGNOSIS — M19.90 OSTEOARTHRITIS: ICD-10-CM

## 2024-06-21 ENCOUNTER — TELEPHONE (OUTPATIENT)
Dept: DERMATOLOGY | Facility: CLINIC | Age: 74
End: 2024-06-21
Payer: MEDICARE

## 2024-06-21 NOTE — TELEPHONE ENCOUNTER
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 We need to know are their any other lesions?        ----- Message from Corinne E Coniglio, RN sent at 6/20/2024  3:17 PM CDT -----  Regarding: FW: Head and Neck  Moh's  ----- Message -----  From: Wallace Bradford  Sent: 6/20/2024   1:27 PM CDT  To: [facility]  Subject: Head and Neck                                    New Cancer Patient Intake Documentation    Diagnosis: malignant lesion from facial lesion (referred for Mohs surgery)    Date patient referral received: 06/24/24    Records collected: pathology report and progress note ([facility]/in )    Questions asked:  What doctor have you seen for this diagnosis?  Kenneth Chowdhury ([facility])    What imaging have you had?   Date of imaging:  Where did that occur?    Have you been diagnosed with cancer by a biopsy and/or surgery? Yes  Date of biopsy: 06/12/24  Date of surgery:  Where did that occur?  [facility]    Other pertinent info:

## 2024-06-24 ENCOUNTER — TELEPHONE (OUTPATIENT)
Dept: DERMATOLOGY | Facility: CLINIC | Age: 74
End: 2024-06-24
Payer: MEDICARE

## 2024-06-24 NOTE — TELEPHONE ENCOUNTER
Spoke with the nurse Ayaka at [facility] she stated that Dr Kenneth Chowdhury sent the patients referral to our office so that Dr. 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 Ayaka the nurse stated that there are no other lesions to be treated at this time and that she would let Dr. Kenneth Chowdhury know and the patient know that there is no further treatment at this time.        ----- Message from Corinne E Coniglio, RN sent at 6/20/2024  3:17 PM CDT -----  Regarding: FW: Head and Neck  Moh's  ----- Message -----  From: Wallace Bradford  Sent: 6/20/2024   1:27 PM CDT  To: [facility]  Subject: Head and Neck                                    New Cancer Patient Intake Documentation    Diagnosis: malignant lesion from facial lesion (referred for Mohs surgery)    Date patient referral received: 06/24/24    Records collected: pathology report and progress note ([facility]/in )    Questions asked:  What doctor have you seen for this diagnosis?  Kenneth Chowdhury ([facility]astic [facility])    What imaging have you had?   Date of imaging:  Where did that occur?    Have you been diagnosed with cancer by a biopsy and/or surgery? Yes  Date of biopsy: 06/12/24  Date of surgery:  Where did that occur?  [facility]    Other pertinent info:

## 2024-09-18 ENCOUNTER — OFFICE VISIT (OUTPATIENT)
Dept: DERMATOLOGY | Facility: CLINIC | Age: 74
End: 2024-09-18
Payer: MEDICARE

## 2024-09-18 DIAGNOSIS — L85.3 XEROSIS CUTIS: ICD-10-CM

## 2024-09-18 DIAGNOSIS — Z12.83 SCREENING EXAM FOR SKIN CANCER: Primary | ICD-10-CM

## 2024-09-18 DIAGNOSIS — D22.9 MULTIPLE BENIGN NEVI: ICD-10-CM

## 2024-09-18 DIAGNOSIS — Z85.828 HISTORY OF NONMELANOMA SKIN CANCER: ICD-10-CM

## 2024-09-18 PROCEDURE — 3288F FALL RISK ASSESSMENT DOCD: CPT | Mod: CPTII,S$GLB,, | Performed by: DERMATOLOGY

## 2024-09-18 PROCEDURE — 1159F MED LIST DOCD IN RCRD: CPT | Mod: CPTII,S$GLB,, | Performed by: DERMATOLOGY

## 2024-09-18 PROCEDURE — 99999 PR PBB SHADOW E&M-EST. PATIENT-LVL III: CPT | Mod: PBBFAC,,, | Performed by: DERMATOLOGY

## 2024-09-18 PROCEDURE — G2211 COMPLEX E/M VISIT ADD ON: HCPCS | Mod: S$GLB,,, | Performed by: DERMATOLOGY

## 2024-09-18 PROCEDURE — 4010F ACE/ARB THERAPY RXD/TAKEN: CPT | Mod: CPTII,S$GLB,, | Performed by: DERMATOLOGY

## 2024-09-18 PROCEDURE — 1126F AMNT PAIN NOTED NONE PRSNT: CPT | Mod: CPTII,S$GLB,, | Performed by: DERMATOLOGY

## 2024-09-18 PROCEDURE — 99213 OFFICE O/P EST LOW 20 MIN: CPT | Mod: S$GLB,,, | Performed by: DERMATOLOGY

## 2024-09-18 PROCEDURE — 1160F RVW MEDS BY RX/DR IN RCRD: CPT | Mod: CPTII,S$GLB,, | Performed by: DERMATOLOGY

## 2024-09-18 PROCEDURE — 1101F PT FALLS ASSESS-DOCD LE1/YR: CPT | Mod: CPTII,S$GLB,, | Performed by: DERMATOLOGY

## 2024-09-18 NOTE — PROGRESS NOTES
Subjective:      Patient ID:  Brian Friedman is a 74 y.o. male who presents for   Chief Complaint   Patient presents with    Skin Check     TBSE     .Patient here for Total Body Skin Exam    Last seen by dermatologist: 5/2/23 by [facility], had lesion on forehead which was removed by Dr. Chowdhury in plastic surgery at [facility].  Apparently this showed an adenocarcinoma arising within an adnexal growth; was sent to Dr. Prater to evaluate for Mohs, she had seen pathology report and advised margins were clear so no further treatment required. This is his first derm visit since that removal.    He was unaware that the lesion had returned as a skin cancer.  Does not have pathology report.    yes - personal history of atypical moles removed  no - personal history of MM   no - family history of MM  no - childhood blistering sunburns  no - tanning bed use  no - personal history of NMSC    No new concerning moles or lesions          Review of Systems   Skin:  Positive for wears hat. Negative for daily sunscreen use, activity-related sunscreen use and recent sunburn.   Hematologic/Lymphatic: Bruises/bleeds easily.       Objective:   Physical Exam   Constitutional: He appears well-developed and well-nourished. No distress.   Neurological: He is alert and oriented to person, place, and time. He is not disoriented.   Psychiatric: He has a normal mood and affect.   Skin:   Areas Examined (abnormalities noted in diagram):   Scalp / Hair Palpated and Inspected  Head / Face Inspection Performed  Neck Inspection Performed  Chest / Axilla Inspection Performed  Abdomen Inspection Performed  Genitals / Buttocks / Groin Inspection Performed  Back Inspection Performed  RUE Inspected  LUE Inspection Performed  RLE Inspected  LLE Inspection Performed  Nails and Digits Inspection Performed                 Diagram Legend     Erythematous scaling macule/papule c/w actinic keratosis       Vascular papule c/w angioma      Pigmented verrucoid papule/plaque c/w  seborrheic keratosis      Yellow umbilicated papule c/w sebaceous hyperplasia      Irregularly shaped tan macule c/w lentigo     1-2 mm smooth white papules consistent with Milia      Movable subcutaneous cyst with punctum c/w epidermal inclusion cyst      Subcutaneous movable cyst c/w pilar cyst      Firm pink to brown papule c/w dermatofibroma      Pedunculated fleshy papule(s) c/w skin tag(s)      Evenly pigmented macule c/w junctional nevus     Mildly variegated pigmented, slightly irregular-bordered macule c/w mildly atypical nevus      Flesh colored to evenly pigmented papule c/w intradermal nevus       Pink pearly papule/plaque c/w basal cell carcinoma      Erythematous hyperkeratotic cursted plaque c/w SCC      Surgical scar with no sign of skin cancer recurrence      Open and closed comedones      Inflammatory papules and pustules      Verrucoid papule consistent consistent with wart     Erythematous eczematous patches and plaques     Dystrophic onycholytic nail with subungual debris c/w onychomycosis     Umbilicated papule    Erythematous-base heme-crusted tan verrucoid plaque consistent with inflamed seborrheic keratosis     Erythematous Silvery Scaling Plaque c/w Psoriasis     See annotation      Assessment / Plan:        Screening exam for skin cancer    Total body skin examination performed today including at least 12 points as noted in physical examination. No lesions suspicious for malignancy noted.    Recommend daily sun protection/avoidance, use of at least SPF 30, broad spectrum sunscreen (OTC drug), skin self examinations, and routine physician surveillance to optimize early detection    Multiple benign nevi  Discussed ABCDE's of nevi.  Monitor for new mole or moles that are becoming bigger, darker, irritated, or developing irregular borders. Instructed patient to observe lesion(s) for changes and follow up in clinic if changes are noted. Patient to monitor skin at home for new or changing  lesions.     Xerosis cutis  Good skin care regimen discussed including limiting to one bath or shower/day, using lukewarm water with mild soap and moisturizing cream to skin 1 - 2x/day. Brochure was provided and reviewed with patient.  Elmer Ron SA  advised and samples given    History of nonmelanoma skin cancer  I see no recurrence of lesion today  No pathology reports available to review to assess if this has been completely treated; records request sent to Dr Chowdhury today for pathology report             Follow up in about 1 year (around 9/18/2025).

## 2025-06-10 DIAGNOSIS — Z12.11 COLON CANCER SCREENING: Primary | ICD-10-CM

## 2025-06-10 DIAGNOSIS — Z87.891 PERSONAL HISTORY OF TOBACCO USE, PRESENTING HAZARDS TO HEALTH: Primary | ICD-10-CM

## 2025-06-11 ENCOUNTER — TELEPHONE (OUTPATIENT)
Dept: ENDOSCOPY | Facility: HOSPITAL | Age: 75
End: 2025-06-11

## 2025-06-11 ENCOUNTER — HOSPITAL ENCOUNTER (OUTPATIENT)
Dept: RADIOLOGY | Facility: HOSPITAL | Age: 75
Discharge: HOME | End: 2025-06-11
Attending: STUDENT IN AN ORGANIZED HEALTH CARE EDUCATION/TRAINING PROGRAM
Payer: MEDICARE

## 2025-06-11 ENCOUNTER — CLINICAL SUPPORT (OUTPATIENT)
Dept: ENDOSCOPY | Facility: HOSPITAL | Age: 75
End: 2025-06-11
Attending: STUDENT IN AN ORGANIZED HEALTH CARE EDUCATION/TRAINING PROGRAM
Payer: MEDICARE

## 2025-06-11 VITALS — BODY MASS INDEX: 27.85 KG/M2 | WEIGHT: 188 LBS | HEIGHT: 69 IN

## 2025-06-11 DIAGNOSIS — Z12.11 COLON CANCER SCREENING: Primary | ICD-10-CM

## 2025-06-11 DIAGNOSIS — Z12.11 COLON CANCER SCREENING: ICD-10-CM

## 2025-06-11 DIAGNOSIS — Z87.891 PERSONAL HISTORY OF TOBACCO USE, PRESENTING HAZARDS TO HEALTH: ICD-10-CM

## 2025-06-11 PROCEDURE — 71271 CT THORAX LUNG CANCER SCR C-: CPT | Mod: 26,,, | Performed by: RADIOLOGY

## 2025-06-11 PROCEDURE — 71271 CT THORAX LUNG CANCER SCR C-: CPT | Mod: TC

## 2025-06-11 RX ORDER — POLYETHYLENE GLYCOL 3350, SODIUM SULFATE ANHYDROUS, SODIUM BICARBONATE, SODIUM CHLORIDE, POTASSIUM CHLORIDE 236; 22.74; 6.74; 5.86; 2.97 G/4L; G/4L; G/4L; G/4L; G/4L
4 POWDER, FOR SOLUTION ORAL ONCE
Qty: 4000 ML | Refills: 0 | Status: SHIPPED | OUTPATIENT
Start: 2025-06-11 | End: 2025-06-11

## 2025-06-11 NOTE — PLAN OF CARE
Patient is scheduled for a Colonoscopy on 7/10/2025 with Dr. DAVE Roman  Referral for procedure from [facility]

## 2025-07-10 ENCOUNTER — TELEPHONE (OUTPATIENT)
Dept: ENDOSCOPY | Facility: HOSPITAL | Age: 75
End: 2025-07-10
Payer: MEDICARE

## 2025-07-10 DIAGNOSIS — Z12.11 COLON CANCER SCREENING: Primary | ICD-10-CM

## 2025-07-10 RX ORDER — SODIUM, POTASSIUM,MAG SULFATES 17.5-3.13G
1 SOLUTION, RECONSTITUTED, ORAL ORAL DAILY
Qty: 1 KIT | Refills: 0 | Status: SHIPPED | OUTPATIENT
Start: 2025-07-10 | End: 2025-07-12

## 2025-08-22 ENCOUNTER — PATIENT MESSAGE (OUTPATIENT)
Dept: ENDOSCOPY | Facility: HOSPITAL | Age: 75
End: 2025-08-22
Payer: MEDICARE

## 2025-08-28 ENCOUNTER — HOSPITAL ENCOUNTER (OUTPATIENT)
Facility: HOSPITAL | Age: 75
Discharge: HOME | End: 2025-08-28
Attending: INTERNAL MEDICINE | Admitting: INTERNAL MEDICINE
Payer: MEDICARE

## 2025-08-28 ENCOUNTER — ANESTHESIA (OUTPATIENT)
Dept: ENDOSCOPY | Facility: HOSPITAL | Age: 75
End: 2025-08-28
Payer: MEDICARE

## 2025-08-28 ENCOUNTER — ANESTHESIA EVENT (OUTPATIENT)
Dept: ENDOSCOPY | Facility: HOSPITAL | Age: 75
End: 2025-08-28
Payer: MEDICARE

## 2025-08-28 VITALS
WEIGHT: 182.75 LBS | HEIGHT: 69 IN | DIASTOLIC BLOOD PRESSURE: 72 MMHG | BODY MASS INDEX: 27.07 KG/M2 | OXYGEN SATURATION: 97 % | TEMPERATURE: 98 F | RESPIRATION RATE: 16 BRPM | SYSTOLIC BLOOD PRESSURE: 162 MMHG | HEART RATE: 59 BPM

## 2025-08-28 DIAGNOSIS — Z86.0100 HISTORY OF COLON POLYPS: ICD-10-CM

## 2025-08-28 DIAGNOSIS — Z12.11 COLON CANCER SCREENING: ICD-10-CM

## 2025-08-28 PROCEDURE — 27200997: Performed by: INTERNAL MEDICINE

## 2025-08-28 PROCEDURE — 37000009 HC ANESTHESIA EA ADD 15 MINS: Performed by: INTERNAL MEDICINE

## 2025-08-28 PROCEDURE — 45385 COLONOSCOPY W/LESION REMOVAL: CPT | Mod: PT | Performed by: INTERNAL MEDICINE

## 2025-08-28 PROCEDURE — 27201089 HC SNARE, DISP (ANY): Performed by: INTERNAL MEDICINE

## 2025-08-28 PROCEDURE — 63600175 PHARM REV CODE 636 W HCPCS: Performed by: NURSE ANESTHETIST, CERTIFIED REGISTERED

## 2025-08-28 PROCEDURE — 37000008 HC ANESTHESIA 1ST 15 MINUTES: Performed by: INTERNAL MEDICINE

## 2025-08-28 PROCEDURE — 88305 TISSUE EXAM BY PATHOLOGIST: CPT | Mod: TC | Performed by: STUDENT IN AN ORGANIZED HEALTH CARE EDUCATION/TRAINING PROGRAM

## 2025-08-28 PROCEDURE — 45385 COLONOSCOPY W/LESION REMOVAL: CPT | Mod: PT,,, | Performed by: INTERNAL MEDICINE

## 2025-08-28 PROCEDURE — 25000003 PHARM REV CODE 250: Performed by: INTERNAL MEDICINE

## 2025-08-28 RX ORDER — SODIUM CHLORIDE 9 MG/ML
INJECTION, SOLUTION INTRAVENOUS CONTINUOUS
Status: DISCONTINUED | OUTPATIENT
Start: 2025-08-28 | End: 2025-08-28 | Stop reason: HOSPADM

## 2025-08-28 RX ORDER — LIDOCAINE HYDROCHLORIDE 20 MG/ML
INJECTION, SOLUTION EPIDURAL; INFILTRATION; INTRACAUDAL; PERINEURAL
Status: DISCONTINUED | OUTPATIENT
Start: 2025-08-28 | End: 2025-08-28

## 2025-08-28 RX ORDER — PROPOFOL 10 MG/ML
VIAL (ML) INTRAVENOUS
Status: DISCONTINUED | OUTPATIENT
Start: 2025-08-28 | End: 2025-08-28

## 2025-08-28 RX ADMIN — LIDOCAINE HYDROCHLORIDE 50 MG: 20 INJECTION, SOLUTION EPIDURAL; INFILTRATION; INTRACAUDAL at 09:08

## 2025-08-28 RX ADMIN — PROPOFOL 80 MG: 10 INJECTION, EMULSION INTRAVENOUS at 09:08

## 2025-08-28 RX ADMIN — PROPOFOL 30 MG: 10 INJECTION, EMULSION INTRAVENOUS at 09:08

## 2025-08-28 RX ADMIN — SODIUM CHLORIDE: 0.9 INJECTION, SOLUTION INTRAVENOUS at 09:08

## 2025-08-28 RX ADMIN — PROPOFOL 150 MCG/KG/MIN: 10 INJECTION, EMULSION INTRAVENOUS at 09:08

## 2025-09-02 LAB
ESTROGEN SERPL-MCNC: NORMAL PG/ML
INSULIN SERPL-ACNC: NORMAL U[IU]/ML
LAB AP CLINICAL INFORMATION: NORMAL
LAB AP GROSS DESCRIPTION: NORMAL
LAB AP PERFORMING LOCATION(S): NORMAL
LAB AP REPORT FOOTNOTES: NORMAL